# Patient Record
Sex: FEMALE | Race: WHITE | Employment: FULL TIME | URBAN - METROPOLITAN AREA
[De-identification: names, ages, dates, MRNs, and addresses within clinical notes are randomized per-mention and may not be internally consistent; named-entity substitution may affect disease eponyms.]

---

## 2019-02-13 PROBLEM — E66.812 CLASS 2 OBESITY WITH BODY MASS INDEX (BMI) OF 38.0 TO 38.9 IN ADULT: Status: ACTIVE | Noted: 2019-02-13

## 2019-02-13 PROBLEM — R74.8 ELEVATED ALKALINE PHOSPHATASE LEVEL: Status: ACTIVE | Noted: 2019-02-13

## 2019-02-13 PROBLEM — E66.9 CLASS 2 OBESITY WITH BODY MASS INDEX (BMI) OF 38.0 TO 38.9 IN ADULT: Status: ACTIVE | Noted: 2019-02-13

## 2019-02-13 PROBLEM — E05.90 HYPERTHYROIDISM: Status: ACTIVE | Noted: 2019-02-13

## 2019-02-14 ENCOUNTER — OFFICE VISIT (OUTPATIENT)
Dept: ENDOCRINOLOGY | Age: 64
End: 2019-02-14
Payer: COMMERCIAL

## 2019-02-14 VITALS
SYSTOLIC BLOOD PRESSURE: 124 MMHG | BODY MASS INDEX: 37.25 KG/M2 | HEART RATE: 75 BPM | DIASTOLIC BLOOD PRESSURE: 69 MMHG | HEIGHT: 62 IN | WEIGHT: 202.4 LBS

## 2019-02-14 DIAGNOSIS — I10 ESSENTIAL HYPERTENSION: ICD-10-CM

## 2019-02-14 DIAGNOSIS — E05.90 HYPERTHYROIDISM: ICD-10-CM

## 2019-02-14 DIAGNOSIS — E78.2 MIXED HYPERLIPIDEMIA: ICD-10-CM

## 2019-02-14 DIAGNOSIS — R74.8 ELEVATED ALKALINE PHOSPHATASE LEVEL: ICD-10-CM

## 2019-02-14 DIAGNOSIS — E66.01 CLASS 2 SEVERE OBESITY WITH SERIOUS COMORBIDITY AND BODY MASS INDEX (BMI) OF 37.0 TO 37.9 IN ADULT, UNSPECIFIED OBESITY TYPE (HCC): ICD-10-CM

## 2019-02-14 DIAGNOSIS — E04.9 GOITER: ICD-10-CM

## 2019-02-14 PROCEDURE — 99205 OFFICE O/P NEW HI 60 MIN: CPT | Performed by: INTERNAL MEDICINE

## 2019-02-14 RX ORDER — METFORMIN HYDROCHLORIDE 500 MG/1
2000 TABLET, EXTENDED RELEASE ORAL DAILY
Qty: 360 TABLET | Refills: 0 | Status: SHIPPED | OUTPATIENT
Start: 2019-02-14 | End: 2019-06-10 | Stop reason: SDUPTHER

## 2019-02-14 RX ORDER — GLIPIZIDE 10 MG/1
10 TABLET, FILM COATED, EXTENDED RELEASE ORAL 2 TIMES DAILY
Qty: 180 TABLET | Refills: 0 | Status: SHIPPED | OUTPATIENT
Start: 2019-02-14 | End: 2019-06-10

## 2019-02-14 RX ORDER — GLIPIZIDE 10 MG/1
10 TABLET, FILM COATED, EXTENDED RELEASE ORAL DAILY
COMMUNITY
Start: 2019-02-06 | End: 2019-02-14 | Stop reason: SDUPTHER

## 2019-02-14 RX ORDER — FUROSEMIDE 20 MG/1
20 TABLET ORAL PRN
COMMUNITY
Start: 2018-07-12

## 2019-02-14 RX ORDER — IBUPROFEN 200 MG
200 TABLET ORAL EVERY 6 HOURS PRN
COMMUNITY

## 2019-02-21 ENCOUNTER — HOSPITAL ENCOUNTER (OUTPATIENT)
Dept: ULTRASOUND IMAGING | Age: 64
Discharge: HOME | End: 2019-02-21
Payer: COMMERCIAL

## 2019-02-21 DIAGNOSIS — E05.90 HYPERTHYROIDISM: ICD-10-CM

## 2019-02-21 DIAGNOSIS — R74.8 ELEVATED ALKALINE PHOSPHATASE LEVEL: ICD-10-CM

## 2019-02-21 DIAGNOSIS — E78.2 MIXED HYPERLIPIDEMIA: ICD-10-CM

## 2019-02-21 DIAGNOSIS — E04.9 GOITER: ICD-10-CM

## 2019-02-21 LAB
A/G RATIO: 1.2 (ref 1.1–2.2)
ALBUMIN SERPL-MCNC: 3.8 G/DL (ref 3.4–5)
ALP BLD-CCNC: 159 U/L (ref 40–129)
ALT SERPL-CCNC: 10 U/L (ref 10–40)
ANION GAP SERPL CALCULATED.3IONS-SCNC: 14 MMOL/L (ref 3–16)
ANTI-THYROGLOB ABS: <10 IU/ML
AST SERPL-CCNC: 19 U/L (ref 15–37)
BILIRUB SERPL-MCNC: 0.3 MG/DL (ref 0–1)
BUN BLDV-MCNC: 23 MG/DL (ref 7–20)
CALCIUM SERPL-MCNC: 9.5 MG/DL (ref 8.3–10.6)
CHLORIDE BLD-SCNC: 100 MMOL/L (ref 99–110)
CHOLESTEROL, TOTAL: 138 MG/DL (ref 0–199)
CO2: 27 MMOL/L (ref 21–32)
CREAT SERPL-MCNC: 0.7 MG/DL (ref 0.6–1.2)
CREATININE URINE: 82.4 MG/DL (ref 28–259)
ESTIMATED AVERAGE GLUCOSE: 203 MG/DL
GFR AFRICAN AMERICAN: >60
GFR NON-AFRICAN AMERICAN: >60
GLOBULIN: 3.1 G/DL
GLUCOSE BLD-MCNC: 108 MG/DL (ref 70–99)
HBA1C MFR BLD: 8.7 %
HDLC SERPL-MCNC: 52 MG/DL (ref 40–60)
LDL CHOLESTEROL CALCULATED: 70 MG/DL
MICROALBUMIN UR-MCNC: <1.2 MG/DL
MICROALBUMIN/CREAT UR-RTO: NORMAL MG/G (ref 0–30)
PARATHYROID HORMONE INTACT: 25.8 PG/ML (ref 14–72)
PHOSPHORUS: 4.4 MG/DL (ref 2.5–4.9)
POTASSIUM SERPL-SCNC: 4.3 MMOL/L (ref 3.5–5.1)
SODIUM BLD-SCNC: 141 MMOL/L (ref 136–145)
T3 FREE: 4.6 PG/ML (ref 2.3–4.2)
T3 TOTAL: 2.22 NG/ML (ref 0.8–2)
T4 FREE: 1.5 NG/DL (ref 0.9–1.8)
T4 TOTAL: 10.4 UG/DL (ref 4.5–10.9)
THYROID PEROXIDASE (TPO) ABS: 116 IU/ML
TOTAL PROTEIN: 6.9 G/DL (ref 6.4–8.2)
TRIGL SERPL-MCNC: 78 MG/DL (ref 0–150)
VITAMIN D 25-HYDROXY: 77.5 NG/ML
VLDLC SERPL CALC-MCNC: 16 MG/DL

## 2019-02-21 PROCEDURE — 83520 IMMUNOASSAY QUANT NOS NONAB: CPT

## 2019-02-21 PROCEDURE — 36415 COLL VENOUS BLD VENIPUNCTURE: CPT

## 2019-02-21 PROCEDURE — 82306 VITAMIN D 25 HYDROXY: CPT

## 2019-02-21 PROCEDURE — 83970 ASSAY OF PARATHORMONE: CPT

## 2019-02-21 PROCEDURE — 84481 FREE ASSAY (FT-3): CPT

## 2019-02-21 PROCEDURE — 84439 ASSAY OF FREE THYROXINE: CPT

## 2019-02-21 PROCEDURE — 86376 MICROSOMAL ANTIBODY EACH: CPT

## 2019-02-21 PROCEDURE — 80053 COMPREHEN METABOLIC PANEL: CPT

## 2019-02-21 PROCEDURE — 84080 ASSAY ALKALINE PHOSPHATASES: CPT

## 2019-02-21 PROCEDURE — 84100 ASSAY OF PHOSPHORUS: CPT

## 2019-02-21 PROCEDURE — 82043 UR ALBUMIN QUANTITATIVE: CPT

## 2019-02-21 PROCEDURE — 84075 ASSAY ALKALINE PHOSPHATASE: CPT

## 2019-02-21 PROCEDURE — 76536 US EXAM OF HEAD AND NECK: CPT

## 2019-02-21 PROCEDURE — 84445 ASSAY OF TSI GLOBULIN: CPT

## 2019-02-21 PROCEDURE — 84681 ASSAY OF C-PEPTIDE: CPT

## 2019-02-21 PROCEDURE — 80061 LIPID PANEL: CPT

## 2019-02-21 PROCEDURE — 82570 ASSAY OF URINE CREATININE: CPT

## 2019-02-21 PROCEDURE — 83036 HEMOGLOBIN GLYCOSYLATED A1C: CPT

## 2019-02-21 PROCEDURE — 86800 THYROGLOBULIN ANTIBODY: CPT

## 2019-02-23 LAB
C-PEPTIDE: 3.4 NG/ML (ref 1.1–4.4)
TSH RECEPTOR AB: 3.6 IU/L

## 2019-02-24 LAB
ALK PHOS OTHER CALC: 0 U/L
ALK PHOSPHATASE: 183 U/L (ref 40–120)
ALKALINE PHOSPHATASE BONE FRACTION: 55 U/L (ref 0–55)
ALKALINE PHOSPHATASE LIVER FRACTION: 128 U/L (ref 0–94)
THYROID STIMULATING IMMUNOGLOBULIN: 263 %

## 2019-03-03 ENCOUNTER — TELEPHONE (OUTPATIENT)
Dept: ENDOCRINOLOGY | Age: 64
End: 2019-03-03

## 2019-03-03 DIAGNOSIS — E04.2 MULTINODULAR GOITER: ICD-10-CM

## 2019-03-03 DIAGNOSIS — E05.90 HYPERTHYROIDISM: Primary | ICD-10-CM

## 2019-03-13 ENCOUNTER — HOSPITAL ENCOUNTER (OUTPATIENT)
Dept: NUCLEAR MEDICINE | Age: 64
Discharge: HOME | End: 2019-03-13
Payer: COMMERCIAL

## 2019-03-13 ENCOUNTER — HOSPITAL ENCOUNTER (OUTPATIENT)
Age: 64
Discharge: HOME | End: 2019-03-13
Payer: COMMERCIAL

## 2019-03-13 DIAGNOSIS — E05.90 HYPERTHYROIDISM: ICD-10-CM

## 2019-03-13 DIAGNOSIS — E04.2 MULTINODULAR GOITER: ICD-10-CM

## 2019-03-13 LAB — TSH SERPL DL<=0.05 MIU/L-ACNC: <0.01 UIU/ML (ref 0.27–4.2)

## 2019-03-13 PROCEDURE — 84443 ASSAY THYROID STIM HORMONE: CPT

## 2019-03-13 PROCEDURE — 36415 COLL VENOUS BLD VENIPUNCTURE: CPT

## 2019-03-13 PROCEDURE — 78014 THYROID IMAGING W/BLOOD FLOW: CPT

## 2019-03-13 PROCEDURE — A9516 IODINE I-123 SOD IODIDE MIC: HCPCS | Performed by: INTERNAL MEDICINE

## 2019-03-13 PROCEDURE — 3430000000 HC RX DIAGNOSTIC RADIOPHARMACEUTICAL: Performed by: INTERNAL MEDICINE

## 2019-03-13 RX ADMIN — SODIUM IODIDE I 123 318 MICRO CURIE: 100 CAPSULE, GELATIN COATED ORAL at 08:20

## 2019-03-14 ENCOUNTER — HOSPITAL ENCOUNTER (OUTPATIENT)
Dept: NUCLEAR MEDICINE | Age: 64
Discharge: HOME | End: 2019-03-14
Payer: COMMERCIAL

## 2019-03-15 ENCOUNTER — TELEPHONE (OUTPATIENT)
Dept: ENDOCRINOLOGY | Age: 64
End: 2019-03-15

## 2019-03-15 DIAGNOSIS — E04.2 MULTINODULAR GOITER: Primary | ICD-10-CM

## 2019-03-26 ENCOUNTER — HOSPITAL ENCOUNTER (OUTPATIENT)
Dept: ULTRASOUND IMAGING | Age: 64
Discharge: HOME | End: 2019-03-26
Payer: COMMERCIAL

## 2019-03-26 VITALS
TEMPERATURE: 97.4 F | HEIGHT: 62 IN | HEART RATE: 74 BPM | SYSTOLIC BLOOD PRESSURE: 127 MMHG | OXYGEN SATURATION: 100 % | RESPIRATION RATE: 16 BRPM | WEIGHT: 202 LBS | BODY MASS INDEX: 37.17 KG/M2 | DIASTOLIC BLOOD PRESSURE: 62 MMHG

## 2019-03-26 DIAGNOSIS — E04.2 MULTINODULAR GOITER: ICD-10-CM

## 2019-03-26 PROCEDURE — 88173 CYTOPATH EVAL FNA REPORT: CPT

## 2019-03-26 PROCEDURE — 2500000003 HC RX 250 WO HCPCS: Performed by: INTERNAL MEDICINE

## 2019-03-26 PROCEDURE — 10005 FNA BX W/US GDN 1ST LES: CPT

## 2019-03-26 PROCEDURE — 88305 TISSUE EXAM BY PATHOLOGIST: CPT

## 2019-03-26 PROCEDURE — 6370000000 HC RX 637 (ALT 250 FOR IP): Performed by: INTERNAL MEDICINE

## 2019-03-26 RX ORDER — LIDOCAINE HYDROCHLORIDE 10 MG/ML
5 INJECTION, SOLUTION EPIDURAL; INFILTRATION; INTRACAUDAL; PERINEURAL ONCE
Status: COMPLETED | OUTPATIENT
Start: 2019-03-26 | End: 2019-03-26

## 2019-03-26 RX ADMIN — LIDOCAINE HYDROCHLORIDE 5 ML: 10 INJECTION, SOLUTION EPIDURAL; INFILTRATION; INTRACAUDAL; PERINEURAL at 09:01

## 2019-03-26 RX ADMIN — NEOMYCIN AND POLYMYXIN B SULFATES, BACITRACIN ZINC, AND HYDROCORTISONE: 3.5; 5000; 400; 1 OINTMENT TOPICAL at 09:10

## 2019-03-29 ENCOUNTER — OFFICE VISIT (OUTPATIENT)
Dept: ENDOCRINOLOGY | Age: 64
End: 2019-03-29
Payer: COMMERCIAL

## 2019-03-29 VITALS
WEIGHT: 197.2 LBS | SYSTOLIC BLOOD PRESSURE: 127 MMHG | HEART RATE: 82 BPM | BODY MASS INDEX: 36.29 KG/M2 | HEIGHT: 62 IN | DIASTOLIC BLOOD PRESSURE: 56 MMHG

## 2019-03-29 DIAGNOSIS — E05.90 HYPERTHYROIDISM: ICD-10-CM

## 2019-03-29 DIAGNOSIS — I10 ESSENTIAL HYPERTENSION: ICD-10-CM

## 2019-03-29 DIAGNOSIS — E04.2 MULTINODULAR GOITER: ICD-10-CM

## 2019-03-29 DIAGNOSIS — E66.01 CLASS 2 SEVERE OBESITY WITH SERIOUS COMORBIDITY AND BODY MASS INDEX (BMI) OF 36.0 TO 36.9 IN ADULT, UNSPECIFIED OBESITY TYPE (HCC): ICD-10-CM

## 2019-03-29 DIAGNOSIS — E78.2 MIXED HYPERLIPIDEMIA: ICD-10-CM

## 2019-03-29 DIAGNOSIS — R74.8 ELEVATED ALKALINE PHOSPHATASE LEVEL: ICD-10-CM

## 2019-03-29 PROCEDURE — 99215 OFFICE O/P EST HI 40 MIN: CPT | Performed by: INTERNAL MEDICINE

## 2019-05-20 ENCOUNTER — TELEPHONE (OUTPATIENT)
Dept: ENDOCRINOLOGY | Age: 64
End: 2019-05-20

## 2019-05-20 NOTE — TELEPHONE ENCOUNTER
Pt called bc she is supposed to have an FNA completed in [facility] on 6/03/19 @ 1:00PM. Pt wasn't sure if she had to fast or what medications she's supposed to stop taking. Informed patient that they will get a call back from nurse. Pt would like a detailed msg if she doesn't answer.

## 2019-05-20 NOTE — TELEPHONE ENCOUNTER
LMOM that patient does not have to fast or stop any of her medications to have the biopsy. Patient was advised to call with any further questions.

## 2019-06-03 ENCOUNTER — OFFICE VISIT (OUTPATIENT)
Dept: ENDOCRINOLOGY | Age: 64
End: 2019-06-03
Payer: COMMERCIAL

## 2019-06-03 VITALS
HEIGHT: 62 IN | WEIGHT: 203.2 LBS | OXYGEN SATURATION: 98 % | HEART RATE: 72 BPM | RESPIRATION RATE: 16 BRPM | SYSTOLIC BLOOD PRESSURE: 114 MMHG | DIASTOLIC BLOOD PRESSURE: 70 MMHG | BODY MASS INDEX: 37.39 KG/M2

## 2019-06-03 DIAGNOSIS — E04.1 THYROID NODULE: Primary | ICD-10-CM

## 2019-06-03 PROCEDURE — 10005 FNA BX W/US GDN 1ST LES: CPT | Performed by: INTERNAL MEDICINE

## 2019-06-03 NOTE — PROGRESS NOTES
Ultrasound Guided Fine Needle Aspiration     Indication: Thyroid Nodule  :   Daniel Arm  Assistant   Aldo Gore  Procedure:  Procedure was explained to patient and consent was obtained. Skin was prepped in semi-sterile manner and local anasthesia (ethyl Chloride)was administered. Right 1.7 cm was biopsied under ultrasound guidance. 27-Gauge needle was used for aspiration under capillary technique. 5 passes were made.  3 were used for cytolyte tube and 2 for genetic testing

## 2019-06-06 ENCOUNTER — HOSPITAL ENCOUNTER (OUTPATIENT)
Age: 64
Discharge: HOME | End: 2019-06-06
Payer: COMMERCIAL

## 2019-06-06 DIAGNOSIS — E05.90 HYPERTHYROIDISM: ICD-10-CM

## 2019-06-06 LAB
A/G RATIO: 1 (ref 1.1–2.2)
ALBUMIN SERPL-MCNC: 3.5 G/DL (ref 3.4–5)
ALP BLD-CCNC: 155 U/L (ref 40–129)
ALT SERPL-CCNC: 8 U/L (ref 10–40)
ANION GAP SERPL CALCULATED.3IONS-SCNC: 19 MMOL/L (ref 3–16)
AST SERPL-CCNC: 18 U/L (ref 15–37)
BILIRUB SERPL-MCNC: 0.3 MG/DL (ref 0–1)
BUN BLDV-MCNC: 25 MG/DL (ref 7–20)
CALCIUM SERPL-MCNC: 9.4 MG/DL (ref 8.3–10.6)
CHLORIDE BLD-SCNC: 104 MMOL/L (ref 99–110)
CHOLESTEROL, TOTAL: 153 MG/DL (ref 0–199)
CO2: 21 MMOL/L (ref 21–32)
CREAT SERPL-MCNC: 0.7 MG/DL (ref 0.6–1.2)
ESTIMATED AVERAGE GLUCOSE: 177.2 MG/DL
GFR AFRICAN AMERICAN: >60
GFR NON-AFRICAN AMERICAN: >60
GLOBULIN: 3.6 G/DL
GLUCOSE BLD-MCNC: 77 MG/DL (ref 70–99)
HBA1C MFR BLD: 7.8 %
HDLC SERPL-MCNC: 53 MG/DL (ref 40–60)
LDL CHOLESTEROL CALCULATED: 83 MG/DL
POTASSIUM SERPL-SCNC: 4.1 MMOL/L (ref 3.5–5.1)
SODIUM BLD-SCNC: 144 MMOL/L (ref 136–145)
T3 FREE: 4.3 PG/ML (ref 2.3–4.2)
T3 TOTAL: 1.93 NG/ML (ref 0.8–2)
T4 FREE: 1.4 NG/DL (ref 0.9–1.8)
T4 TOTAL: 10.7 UG/DL (ref 4.5–10.9)
TOTAL PROTEIN: 7.1 G/DL (ref 6.4–8.2)
TRIGL SERPL-MCNC: 87 MG/DL (ref 0–150)
TSH SERPL DL<=0.05 MIU/L-ACNC: <0.01 UIU/ML (ref 0.27–4.2)
VITAMIN D 25-HYDROXY: 62.7 NG/ML
VLDLC SERPL CALC-MCNC: 17 MG/DL

## 2019-06-06 PROCEDURE — 84075 ASSAY ALKALINE PHOSPHATASE: CPT

## 2019-06-06 PROCEDURE — 80053 COMPREHEN METABOLIC PANEL: CPT

## 2019-06-06 PROCEDURE — 84443 ASSAY THYROID STIM HORMONE: CPT

## 2019-06-06 PROCEDURE — 83036 HEMOGLOBIN GLYCOSYLATED A1C: CPT

## 2019-06-06 PROCEDURE — 80061 LIPID PANEL: CPT

## 2019-06-06 PROCEDURE — 36415 COLL VENOUS BLD VENIPUNCTURE: CPT

## 2019-06-06 PROCEDURE — 84439 ASSAY OF FREE THYROXINE: CPT

## 2019-06-06 PROCEDURE — 82306 VITAMIN D 25 HYDROXY: CPT

## 2019-06-06 PROCEDURE — 84481 FREE ASSAY (FT-3): CPT

## 2019-06-06 PROCEDURE — 84080 ASSAY ALKALINE PHOSPHATASES: CPT

## 2019-06-08 LAB
ALK PHOS OTHER CALC: 0 U/L
ALK PHOSPHATASE: 164 U/L (ref 40–120)
ALKALINE PHOSPHATASE BONE FRACTION: 43 U/L (ref 0–55)
ALKALINE PHOSPHATASE LIVER FRACTION: 121 U/L (ref 0–94)

## 2019-06-10 ENCOUNTER — OFFICE VISIT (OUTPATIENT)
Dept: ENDOCRINOLOGY | Age: 64
End: 2019-06-10
Payer: COMMERCIAL

## 2019-06-10 VITALS
BODY MASS INDEX: 37.8 KG/M2 | OXYGEN SATURATION: 98 % | HEIGHT: 62 IN | WEIGHT: 205.4 LBS | SYSTOLIC BLOOD PRESSURE: 136 MMHG | DIASTOLIC BLOOD PRESSURE: 56 MMHG | HEART RATE: 67 BPM

## 2019-06-10 DIAGNOSIS — I10 ESSENTIAL HYPERTENSION: ICD-10-CM

## 2019-06-10 DIAGNOSIS — E66.01 CLASS 2 SEVERE OBESITY WITH SERIOUS COMORBIDITY AND BODY MASS INDEX (BMI) OF 37.0 TO 37.9 IN ADULT, UNSPECIFIED OBESITY TYPE (HCC): ICD-10-CM

## 2019-06-10 DIAGNOSIS — E78.2 MIXED HYPERLIPIDEMIA: ICD-10-CM

## 2019-06-10 DIAGNOSIS — E05.90 HYPERTHYROIDISM: ICD-10-CM

## 2019-06-10 DIAGNOSIS — E04.2 MULTINODULAR GOITER: ICD-10-CM

## 2019-06-10 DIAGNOSIS — R74.8 ELEVATED ALKALINE PHOSPHATASE LEVEL: ICD-10-CM

## 2019-06-10 PROCEDURE — 99215 OFFICE O/P EST HI 40 MIN: CPT | Performed by: INTERNAL MEDICINE

## 2019-06-10 RX ORDER — GLIPIZIDE 10 MG/1
10 TABLET, FILM COATED, EXTENDED RELEASE ORAL 2 TIMES DAILY
Qty: 180 TABLET | Refills: 1 | Status: SHIPPED | OUTPATIENT
Start: 2019-06-10 | End: 2019-12-31

## 2019-06-10 RX ORDER — METFORMIN HYDROCHLORIDE 500 MG/1
2000 TABLET, EXTENDED RELEASE ORAL DAILY
Qty: 360 TABLET | Refills: 1 | Status: SHIPPED | OUTPATIENT
Start: 2019-06-10 | End: 2019-12-31

## 2019-06-10 NOTE — PROGRESS NOTES
abused: Not on file     Physically abused: Not on file     Forced sexual activity: Not on file   Other Topics Concern    Not on file   Social History Narrative    Not on file     Family History   Problem Relation Age of Onset    Heart Disease Mother     Diabetes Mother     Diabetes Father     COPD Father     Diabetes Maternal Aunt     Heart Disease Brother     Cancer Sister      Current Outpatient Medications   Medication Sig Dispense Refill    metFORMIN (GLUCOPHAGE XR) 500 MG extended release tablet Take 4 tablets by mouth daily 360 tablet 1    glipiZIDE (GLUCOTROL XL) 10 MG extended release tablet Take 1 tablet by mouth 2 times daily 180 tablet 1    Dulaglutide (TRULICITY) 3.23 CC/1.2DS SOPN 1 pen weekly 4 pen 2    Cholecalciferol (VITAMIN D3) 5000 units TABS Take one tab qod 30 tablet 0    insulin glargine (LANTUS SOLOSTAR) 100 UNIT/ML injection pen Inject 60 Units into the skin      furosemide (LASIX) 20 MG tablet Take 20 mg by mouth      ibuprofen (ADVIL;MOTRIN) 200 MG tablet Take 200 mg by mouth every 6 hours as needed for Pain      simvastatin (ZOCOR) 20 MG tablet Take 20 mg by mouth nightly.  lisinopril-hydrochlorothiazide (PRINZIDE;ZESTORETIC) 20-25 MG per tablet Take 1 tablet by mouth daily.  aspirin 81 MG tablet Take 81 mg by mouth daily. No current facility-administered medications for this visit.       No Known Allergies  Family Status   Relation Name Status    Mother      Father     [de-identified]  (Not Specified)    Sister  Alive    Brother  Alive    Sister         Lab Review:    No results found for: WBC, HGB, HCT, MCV, PLT  Lab Results   Component Value Date     2019    K 4.1 2019     2019    CO2 21 2019    BUN 25 2019    CREATININE 0.7 2019    GLUCOSE 77 2019    CALCIUM 9.4 2019    PROT 7.1 2019    LABALBU 3.5 2019    BILITOT 0.3 2019    ALKPHOS 164 2019 ALKPHOS 155 06/06/2019    AST 18 06/06/2019    ALT 8 06/06/2019    LABGLOM >60 06/06/2019    GFRAA >60 06/06/2019    AGRATIO 1.0 06/06/2019    GLOB 3.6 06/06/2019     Lab Results   Component Value Date    TSH <0.01 06/06/2019    FT3 4.3 06/06/2019     Lab Results   Component Value Date    LABA1C 7.8 06/06/2019     Lab Results   Component Value Date    .2 06/06/2019     Lab Results   Component Value Date    CHOL 153 06/06/2019     Lab Results   Component Value Date    TRIG 87 06/06/2019     Lab Results   Component Value Date    HDL 53 06/06/2019     Lab Results   Component Value Date    LDLCALC 83 06/06/2019     Lab Results   Component Value Date    LABVLDL 17 06/06/2019     No results found for: [facility]  Lab Results   Component Value Date    LABMICR <1.20 02/21/2019     Lab Results   Component Value Date    VITD25 62.7 06/06/2019        Review of Systems:  Constitutional: has fatigue, no fever, no recent weight gain, has recent weight loss, has changes in appetite  Eyes: no eye pain, no change in vision, no eye redness, no eye irritation, no double vision  Ears, nose, throat: no nasal congestion, no sore throat, no earache, no decrease in hearing, no hoarseness, no dry mouth, no sinus problems, no difficulty swallowing, no neck lumps, no dental problems, no mouth sores, no ringing in ears  Pulmonary: no shortness of breath, no wheezing, no dyspnea on exertion, no cough  Cardiovascular: no chest pain, has lower extremity edema, no orthopnea, no intermittent leg claudication, no palpitations  Gastrointestinal: no abdominal pain, no nausea, no vomiting, no diarrhea, no constipation, no dysphagia, no heartburn, no bloating  Genitourinary: no dysuria, no urinary incontinence, no urinary hesitancy, no urinary frequency, no feelings of urinary urgency, no nocturia  Musculoskeletal: no joint swelling, no joint stiffness, has joint pain, no muscle cramps, no muscle pain, no bone pain  Integument/Breast: no hair pulses and 2+ bilaterally, No edema  Abdomen: abdomen is soft, non-tender with no masses  Musculoskeletal: normal gait and station and exam of the digits and nails are normal  Neurological: normal coordination and normal general cortical function, has mild hand tremor    Visual inspection:  Deformity/amputation: absent  Skin lesions/pre-ulcerative calluses: absent  Edema: right- negative, left- negative    Sensory exam:  Monofilament sensation: normal  (minimum of 5 random plantar locations tested, avoiding callused areas - > 1 area with absence of sensation is + for neuropathy)    Plus at least one of the following:  Pulses: normal  Proprioception: Intact  Vibration (128 Hz): Impaired    ASSESSMENT/PLAN:  1. DM type 2, uncontrolled, with neuropathy (HCC)  Hemoglobin A1c 8.7-7.8. Diabetes is uncontrolled. Worse. Metformin  Glipizide   Lantus   Start Trulicity  - Comprehensive Metabolic Panel; Future  - Hemoglobin A1C; Future  - C-Peptide; Future  - Microalbumin / Creatinine Urine Ratio; Future    2. Mixed hyperlipidemia  - Lipid Panel; Future    3. Essential hypertension  Continue current treatment    4. Class 2 severe obesity with serious comorbidity and body mass index (BMI) of 37.0 to 37.9 in adult, unspecified obesity type (HCC)  Diet, exercise. 5. Hyperthyroidism  Uncontrolled. Recommended I-131 Tx. Nodule benign on FNA. TSH receptor antibody and TSI positive. Also positive TPO antibody.  - T3; Future  - T3, Free; Future  - T4, Free; Future  - T4; Future    6. Elevated alkaline phosphatase level  Normal PTH, normal calcium. Alkaline phosphatase, elevated - liver fraction. Referred to family doctor. - Alkaline Phosphatase, Isoenzymes; Future  - PTH, Intact; Future  - Vitamin D 25 Hydroxy; Future  - Phosphorus; Future  Vitamin D to 5000 IU qod.    7.  Multinodular Goiter  Repeated FNA -benign.   Right 1.7 cm nodule nondiagnostic    Thyroid, Right Nodule Fine Needle Aspiration:     - Non-diagnostic    - US Head Neck Soft Tissue Thyroid; Future      Reviewed and/or ordered clinical lab results Yes  Reviewed and/or ordered radiology tests Yes  Reviewed and/or ordered other diagnostic tests No  Discussed test results with performing physician No  Independently reviewed image, tracing, or specimen Yes, thyroid uptake and scan  Made a decision to obtain old records No  Reviewed and summarized old records Yes  TSH less than 0.005, PTH 30, FT4 1.41, ionized calcium 1.13, hemoglobin 11.8, hemoglobin A1c 8.3, LDL cholesterol 85, glucose 104, alkaline phosphatase 212. Obtained history from other than patient No    Grupo Newby was counseled regarding symptoms of diabetes, elevated alk phos, hyperthyroidism diagnosis, course and complications of disease if inadequately treated, side effects of medications, diagnosis, treatment options, and prognosis, risks, benefits, complications, and alternatives of treatment, labs, imaging and other studies and treatment targets and goals, insulin, medications, hyperthyroid treatment, I-131 Tx, permanent hypothyroidism. She understands instructions and counseling. Total visit time 40 min, >50% was counseling time. Return in about 3 months (around 9/10/2019) for thyroid problems, diabetes, 40 min.

## 2019-07-12 ENCOUNTER — HOSPITAL ENCOUNTER (OUTPATIENT)
Dept: NUCLEAR MEDICINE | Age: 64
Discharge: HOME | End: 2019-07-12
Payer: COMMERCIAL

## 2019-07-12 DIAGNOSIS — E05.90 HYPERTHYROIDISM: ICD-10-CM

## 2019-07-12 PROCEDURE — 79005 NUCLEAR RX ORAL ADMIN: CPT

## 2019-07-12 PROCEDURE — 3430000000 HC RX DIAGNOSTIC RADIOPHARMACEUTICAL: Performed by: INTERNAL MEDICINE

## 2019-07-12 PROCEDURE — A9517 I131 IODIDE CAP, RX: HCPCS | Performed by: INTERNAL MEDICINE

## 2019-07-12 RX ADMIN — SODIUM IODIDE I 131 10.3 MILLICURIE: 1 CAPSULE, GELATIN COATED ORAL at 09:35

## 2019-08-31 ENCOUNTER — HOSPITAL ENCOUNTER (EMERGENCY)
Age: 64
Discharge: HOME | End: 2019-08-31
Attending: EMERGENCY MEDICINE
Payer: COMMERCIAL

## 2019-08-31 ENCOUNTER — APPOINTMENT (OUTPATIENT)
Dept: CT IMAGING | Age: 64
End: 2019-08-31
Payer: COMMERCIAL

## 2019-08-31 VITALS
HEART RATE: 67 BPM | RESPIRATION RATE: 15 BRPM | TEMPERATURE: 98.1 F | WEIGHT: 216 LBS | OXYGEN SATURATION: 96 % | SYSTOLIC BLOOD PRESSURE: 123 MMHG | HEIGHT: 62 IN | BODY MASS INDEX: 39.75 KG/M2 | DIASTOLIC BLOOD PRESSURE: 57 MMHG

## 2019-08-31 DIAGNOSIS — N20.0 NEPHROLITHIASIS: ICD-10-CM

## 2019-08-31 DIAGNOSIS — N13.9 OBSTRUCTED, UROPATHY: ICD-10-CM

## 2019-08-31 DIAGNOSIS — N83.8 MASS OF LEFT OVARY: ICD-10-CM

## 2019-08-31 DIAGNOSIS — N30.01 ACUTE CYSTITIS WITH HEMATURIA: Primary | ICD-10-CM

## 2019-08-31 LAB
A/G RATIO: 1.1 (ref 1.1–2.2)
ALBUMIN SERPL-MCNC: 4.3 G/DL (ref 3.4–5)
ALP BLD-CCNC: 144 U/L (ref 40–129)
ALT SERPL-CCNC: 7 U/L (ref 10–40)
ANION GAP SERPL CALCULATED.3IONS-SCNC: 13 MMOL/L (ref 3–16)
AST SERPL-CCNC: 23 U/L (ref 15–37)
BACTERIA: ABNORMAL /HPF
BASOPHILS ABSOLUTE: 0 K/UL (ref 0–0.2)
BASOPHILS RELATIVE PERCENT: 0.3 %
BILIRUB SERPL-MCNC: 0.5 MG/DL (ref 0–1)
BILIRUBIN URINE: NEGATIVE
BLOOD, URINE: ABNORMAL
BUN BLDV-MCNC: 23 MG/DL (ref 7–20)
CALCIUM SERPL-MCNC: 9.4 MG/DL (ref 8.3–10.6)
CHLORIDE BLD-SCNC: 101 MMOL/L (ref 99–110)
CLARITY: ABNORMAL
CO2: 28 MMOL/L (ref 21–32)
COLOR: YELLOW
CREAT SERPL-MCNC: 1 MG/DL (ref 0.6–1.2)
EOSINOPHILS ABSOLUTE: 0 K/UL (ref 0–0.6)
EOSINOPHILS RELATIVE PERCENT: 0.2 %
EPITHELIAL CELLS, UA: 1 /HPF (ref 0–5)
GFR AFRICAN AMERICAN: >60
GFR NON-AFRICAN AMERICAN: 56
GLOBULIN: 4 G/DL
GLUCOSE BLD-MCNC: 135 MG/DL (ref 70–99)
GLUCOSE URINE: NEGATIVE MG/DL
HCT VFR BLD CALC: 40.6 % (ref 36–48)
HEMOGLOBIN: 13.2 G/DL (ref 12–16)
HYALINE CASTS: 0 /LPF (ref 0–8)
KETONES, URINE: NEGATIVE MG/DL
LEUKOCYTE ESTERASE, URINE: ABNORMAL
LIPASE: 19 U/L (ref 13–60)
LYMPHOCYTES ABSOLUTE: 0.8 K/UL (ref 1–5.1)
LYMPHOCYTES RELATIVE PERCENT: 6.1 %
MAGNESIUM: 1.8 MG/DL (ref 1.8–2.4)
MCH RBC QN AUTO: 25.2 PG (ref 26–34)
MCHC RBC AUTO-ENTMCNC: 32.4 G/DL (ref 31–36)
MCV RBC AUTO: 77.6 FL (ref 80–100)
MICROSCOPIC EXAMINATION: YES
MONOCYTES ABSOLUTE: 0.8 K/UL (ref 0–1.3)
MONOCYTES RELATIVE PERCENT: 6 %
NEUTROPHILS ABSOLUTE: 11.8 K/UL (ref 1.7–7.7)
NEUTROPHILS RELATIVE PERCENT: 87.4 %
NITRITE, URINE: NEGATIVE
PDW BLD-RTO: 17 % (ref 12.4–15.4)
PH UA: 6 (ref 5–8)
PLATELET # BLD: 261 K/UL (ref 135–450)
PMV BLD AUTO: 8 FL (ref 5–10.5)
POTASSIUM REFLEX MAGNESIUM: 3.3 MMOL/L (ref 3.5–5.1)
PROTEIN UA: ABNORMAL MG/DL
RBC # BLD: 5.23 M/UL (ref 4–5.2)
RBC UA: 343 /HPF (ref 0–4)
SODIUM BLD-SCNC: 142 MMOL/L (ref 136–145)
SPECIFIC GRAVITY UA: 1.02 (ref 1–1.03)
TOTAL PROTEIN: 8.3 G/DL (ref 6.4–8.2)
URINE REFLEX TO CULTURE: YES
URINE TYPE: ABNORMAL
UROBILINOGEN, URINE: 0.2 E.U./DL
WBC # BLD: 13.5 K/UL (ref 4–11)
WBC UA: 24 /HPF (ref 0–5)

## 2019-08-31 PROCEDURE — 2580000003 HC RX 258: Performed by: NURSE PRACTITIONER

## 2019-08-31 PROCEDURE — 83735 ASSAY OF MAGNESIUM: CPT

## 2019-08-31 PROCEDURE — 87086 URINE CULTURE/COLONY COUNT: CPT

## 2019-08-31 PROCEDURE — 85025 COMPLETE CBC W/AUTO DIFF WBC: CPT

## 2019-08-31 PROCEDURE — 6360000004 HC RX CONTRAST MEDICATION: Performed by: NURSE PRACTITIONER

## 2019-08-31 PROCEDURE — 6360000002 HC RX W HCPCS: Performed by: NURSE PRACTITIONER

## 2019-08-31 PROCEDURE — 96375 TX/PRO/DX INJ NEW DRUG ADDON: CPT

## 2019-08-31 PROCEDURE — 96374 THER/PROPH/DIAG INJ IV PUSH: CPT

## 2019-08-31 PROCEDURE — 96361 HYDRATE IV INFUSION ADD-ON: CPT

## 2019-08-31 PROCEDURE — 81001 URINALYSIS AUTO W/SCOPE: CPT

## 2019-08-31 PROCEDURE — 96376 TX/PRO/DX INJ SAME DRUG ADON: CPT

## 2019-08-31 PROCEDURE — 74177 CT ABD & PELVIS W/CONTRAST: CPT

## 2019-08-31 PROCEDURE — 83690 ASSAY OF LIPASE: CPT

## 2019-08-31 PROCEDURE — 80053 COMPREHEN METABOLIC PANEL: CPT

## 2019-08-31 PROCEDURE — 99284 EMERGENCY DEPT VISIT MOD MDM: CPT

## 2019-08-31 RX ORDER — TAMSULOSIN HYDROCHLORIDE 0.4 MG/1
0.4 CAPSULE ORAL DAILY
Qty: 5 CAPSULE | Refills: 0 | Status: SHIPPED | OUTPATIENT
Start: 2019-08-31 | End: 2019-12-03

## 2019-08-31 RX ORDER — NAPROXEN 500 MG/1
500 TABLET ORAL 2 TIMES DAILY WITH MEALS
Qty: 30 TABLET | Refills: 0 | Status: SHIPPED | OUTPATIENT
Start: 2019-08-31 | End: 2019-12-03

## 2019-08-31 RX ORDER — HYDROCODONE BITARTRATE AND ACETAMINOPHEN 5; 325 MG/1; MG/1
1 TABLET ORAL EVERY 6 HOURS PRN
Qty: 10 TABLET | Refills: 0 | Status: SHIPPED | OUTPATIENT
Start: 2019-08-31 | End: 2019-09-03

## 2019-08-31 RX ORDER — MORPHINE SULFATE 4 MG/ML
4 INJECTION, SOLUTION INTRAMUSCULAR; INTRAVENOUS EVERY 30 MIN PRN
Status: DISCONTINUED | OUTPATIENT
Start: 2019-08-31 | End: 2019-08-31 | Stop reason: HOSPADM

## 2019-08-31 RX ORDER — KETOROLAC TROMETHAMINE 30 MG/ML
30 INJECTION, SOLUTION INTRAMUSCULAR; INTRAVENOUS ONCE
Status: COMPLETED | OUTPATIENT
Start: 2019-08-31 | End: 2019-08-31

## 2019-08-31 RX ORDER — CEPHALEXIN 500 MG/1
500 CAPSULE ORAL 4 TIMES DAILY
Qty: 28 CAPSULE | Refills: 0 | Status: SHIPPED | OUTPATIENT
Start: 2019-08-31 | End: 2019-09-07

## 2019-08-31 RX ORDER — ONDANSETRON 4 MG/1
4-8 TABLET, ORALLY DISINTEGRATING ORAL EVERY 12 HOURS PRN
Qty: 12 TABLET | Refills: 0 | Status: SHIPPED | OUTPATIENT
Start: 2019-08-31 | End: 2019-12-03

## 2019-08-31 RX ORDER — ONDANSETRON 2 MG/ML
4 INJECTION INTRAMUSCULAR; INTRAVENOUS EVERY 30 MIN PRN
Status: COMPLETED | OUTPATIENT
Start: 2019-08-31 | End: 2019-08-31

## 2019-08-31 RX ORDER — 0.9 % SODIUM CHLORIDE 0.9 %
1000 INTRAVENOUS SOLUTION INTRAVENOUS ONCE
Status: COMPLETED | OUTPATIENT
Start: 2019-08-31 | End: 2019-08-31

## 2019-08-31 RX ADMIN — KETOROLAC TROMETHAMINE 30 MG: 30 INJECTION, SOLUTION INTRAMUSCULAR at 15:20

## 2019-08-31 RX ADMIN — MORPHINE SULFATE 4 MG: 4 INJECTION INTRAVENOUS at 09:43

## 2019-08-31 RX ADMIN — SODIUM CHLORIDE 1000 ML: 9 INJECTION, SOLUTION INTRAVENOUS at 09:43

## 2019-08-31 RX ADMIN — ONDANSETRON 4 MG: 2 INJECTION INTRAMUSCULAR; INTRAVENOUS at 09:43

## 2019-08-31 RX ADMIN — ONDANSETRON 4 MG: 2 INJECTION INTRAMUSCULAR; INTRAVENOUS at 13:06

## 2019-08-31 RX ADMIN — MORPHINE SULFATE 4 MG: 4 INJECTION INTRAVENOUS at 13:06

## 2019-08-31 RX ADMIN — IOPAMIDOL 75 ML: 755 INJECTION, SOLUTION INTRAVENOUS at 12:47

## 2019-08-31 ASSESSMENT — ENCOUNTER SYMPTOMS
VOMITING: 1
SHORTNESS OF BREATH: 0
NAUSEA: 1
BLOOD IN STOOL: 0
CONSTIPATION: 0
SORE THROAT: 0
ABDOMINAL PAIN: 0
DIARRHEA: 0
RHINORRHEA: 0

## 2019-08-31 ASSESSMENT — PAIN DESCRIPTION - LOCATION: LOCATION: FLANK

## 2019-08-31 ASSESSMENT — PAIN SCALES - GENERAL
PAINLEVEL_OUTOF10: 5

## 2019-08-31 ASSESSMENT — PAIN DESCRIPTION - PAIN TYPE: TYPE: ACUTE PAIN

## 2019-08-31 NOTE — ED PROVIDER NOTES
Except as noted above in the ROS, all other systems were reviewed and negative. PAST MEDICAL HISTORY     Past Medical History:   Diagnosis Date    Hyperlipidemia     Hypertension     Type II or unspecified type diabetes mellitus without mention of complication, not stated as uncontrolled          SURGICAL HISTORY       Past Surgical History:   Procedure Laterality Date    HYSTERECTOMY           CURRENT MEDICATIONS       Previous Medications    ASPIRIN 81 MG TABLET    Take 81 mg by mouth daily. CHOLECALCIFEROL (VITAMIN D3) 5000 UNITS TABS    Take one tab qod    DULAGLUTIDE (TRULICITY) 1.54 PX/8.2OC SOPN    1 pen weekly    FUROSEMIDE (LASIX) 20 MG TABLET    Take 20 mg by mouth    GLIPIZIDE (GLUCOTROL XL) 10 MG EXTENDED RELEASE TABLET    Take 1 tablet by mouth 2 times daily    IBUPROFEN (ADVIL;MOTRIN) 200 MG TABLET    Take 200 mg by mouth every 6 hours as needed for Pain    INSULIN GLARGINE (LANTUS SOLOSTAR) 100 UNIT/ML INJECTION PEN    Inject 60 Units into the skin    LISINOPRIL-HYDROCHLOROTHIAZIDE (PRINZIDE;ZESTORETIC) 20-25 MG PER TABLET    Take 1 tablet by mouth daily. METFORMIN (GLUCOPHAGE XR) 500 MG EXTENDED RELEASE TABLET    Take 4 tablets by mouth daily    SIMVASTATIN (ZOCOR) 20 MG TABLET    Take 10 mg by mouth nightly          ALLERGIES     Patient has no known allergies.     FAMILY HISTORY       Family History   Problem Relation Age of Onset    Heart Disease Mother     Diabetes Mother     Diabetes Father     COPD Father     Diabetes Maternal Aunt     Heart Disease Brother     Cancer Sister           SOCIAL HISTORY       Social History     Socioeconomic History    Marital status: Single     Spouse name: None    Number of children: None    Years of education: None    Highest education level: None   Occupational History    None   Social Needs    Financial resource strain: None    Food insecurity:     Worry: None     Inability: None    Transportation needs:     Medical: None 08/31/19 1445 08/31/19 1500 08/31/19 1515   BP: (!) 125/56  (!) 123/57    Pulse:   67    Resp:   15    Temp:   98.1 °F (36.7 °C)    TempSrc:   Oral    SpO2: 98% 99% 91% 96%   Weight:       Height:           Lissa Foster was given the following medications:  Medications   morphine injection 4 mg (4 mg Intravenous Given 8/31/19 1306)   0.9 % sodium chloride bolus (0 mLs Intravenous Stopped 8/31/19 1049)   ondansetron (ZOFRAN) injection 4 mg (4 mg Intravenous Given 8/31/19 1306)   iopamidol (ISOVUE-370) 76 % injection 75 mL (75 mLs Intravenous Given 8/31/19 1247)   ketorolac (TORADOL) injection 30 mg (30 mg Intravenous Given 8/31/19 1520)       Lissa Foster was evaluated in the emergency department with concern for right flank pain. She is actively vomiting on my initial assessment. Treated with IV fluids, morphine, and Zofran. She reports improvement in symptoms and is improved on repeat exam.  Urinalysis is suggestive of infection. She has a mild leukocytosis to support this. CT imaging demonstrates a right ureteral stone. It is obstructive. I did speak with Dr. Zeenat Jeffrey from urology about this patient. He feels outpatient management is reasonable on Keflex as the stone is 2 to 3 mm in size. Patient is not febrile. There is no evidence of renal dysfunction on labs. I did express concern to Dr. Zeenat Jeffrey for possible infected stone. He is not concerned. My suspicion is low for torsion, STDs, pyelonephritis, perinephric abscess, cauda equina, FRANCIA, or epidural abscess. The patient was instructed to push fluids at home and follow up with urology as needed. Of note, there is an incidental finding of left ovarian mass. The patient was informed of these results and instructed to follow-up with gynecology. Lissa Foster is stable in the ER and safe to follow as an outpatient. The patient is discharged on the following medications.   They were counseled on how to take the newly prescribed medications:  New Prescriptions    CEPHALEXIN (KEFLEX) 500 MG CAPSULE    Take 1 capsule by mouth 4 times daily for 7 days    HYDROCODONE-ACETAMINOPHEN (NORCO) 5-325 MG PER TABLET    Take 1 tablet by mouth every 6 hours as needed for Pain for up to 3 days. NAPROXEN (NAPROSYN) 500 MG TABLET    Take 1 tablet by mouth 2 times daily (with meals)    ONDANSETRON (ZOFRAN ODT) 4 MG DISINTEGRATING TABLET    Take 1-2 tablets by mouth every 12 hours as needed for Nausea May Sub regular tablet (non-ODT) if insurance does not cover ODT. TAMSULOSIN (FLOMAX) 0.4 MG CAPSULE    Take 1 capsule by mouth daily for 5 doses    . Instructed to follow-up with:  Ines Figueroa MD  [place] [id number]  [facility] [address] [place] [address]  [phone]    Schedule an appointment as soon as possible for a visit in 3 days  For a recheck    Jean Pierre Vasquez MD  [address] [place] [address]    Schedule an appointment as soon as possible for a visit in 3 days  For a recheck for left ovarian mass    Return to the ER for new or worsening symptoms. This plan was discussed with the patient and all family available in the room at discharge who are all in agreement with the plan. The patient tolerated their visit well. They were seen and evaluated by the attending physician, Coty Louise MD , who agreed with the assessment and plan. The patient and / or the family were informed of the results of any tests, a time was given to answer questions, a plan was proposed and they agreed with plan. FINAL IMPRESSION      1. Acute cystitis with hematuria    2. Obstructed, uropathy    3. Nephrolithiasis    4.  Mass of left ovary          DISPOSITION/PLAN   DISPOSITION Decision To Discharge 08/31/2019 03:13:39 PM        DISCONTINUED MEDICATIONS:  Discontinued Medications    No medications on file                (Please note that portions of this note were completed with a voice recognition program.  Efforts were made to edit the dictations but occasionally words are mis-transcribed.)    HELENA Gastelum CNP (electronically signed)        HELENA Gastelum CNP  08/31/19 6385

## 2019-09-01 LAB — URINE CULTURE, ROUTINE: NORMAL

## 2019-09-04 ENCOUNTER — HOSPITAL ENCOUNTER (OUTPATIENT)
Age: 64
Discharge: HOME | End: 2019-09-04
Payer: COMMERCIAL

## 2019-09-04 DIAGNOSIS — E78.2 MIXED HYPERLIPIDEMIA: ICD-10-CM

## 2019-09-04 DIAGNOSIS — E05.90 HYPERTHYROIDISM: ICD-10-CM

## 2019-09-04 DIAGNOSIS — I10 ESSENTIAL HYPERTENSION: ICD-10-CM

## 2019-09-04 DIAGNOSIS — E04.2 MULTINODULAR GOITER: ICD-10-CM

## 2019-09-04 LAB
A/G RATIO: 1.1 (ref 1.1–2.2)
ALBUMIN SERPL-MCNC: 3.7 G/DL (ref 3.4–5)
ALP BLD-CCNC: 132 U/L (ref 40–129)
ALT SERPL-CCNC: 9 U/L (ref 10–40)
ANION GAP SERPL CALCULATED.3IONS-SCNC: 13 MMOL/L (ref 3–16)
AST SERPL-CCNC: 16 U/L (ref 15–37)
BASOPHILS ABSOLUTE: 0.1 K/UL (ref 0–0.2)
BASOPHILS RELATIVE PERCENT: 0.8 %
BILIRUB SERPL-MCNC: 0.3 MG/DL (ref 0–1)
BUN BLDV-MCNC: 21 MG/DL (ref 7–20)
CALCIUM SERPL-MCNC: 9.2 MG/DL (ref 8.3–10.6)
CHLORIDE BLD-SCNC: 100 MMOL/L (ref 99–110)
CHOLESTEROL, TOTAL: 133 MG/DL (ref 0–199)
CO2: 25 MMOL/L (ref 21–32)
CREAT SERPL-MCNC: 0.8 MG/DL (ref 0.6–1.2)
CREATININE URINE: 95.7 MG/DL (ref 28–259)
EOSINOPHILS ABSOLUTE: 0.3 K/UL (ref 0–0.6)
EOSINOPHILS RELATIVE PERCENT: 3.7 %
ESTIMATED AVERAGE GLUCOSE: 159.9 MG/DL
GFR AFRICAN AMERICAN: >60
GFR NON-AFRICAN AMERICAN: >60
GLOBULIN: 3.4 G/DL
GLUCOSE BLD-MCNC: 102 MG/DL (ref 70–99)
HBA1C MFR BLD: 7.2 %
HCT VFR BLD CALC: 34.6 % (ref 36–48)
HDLC SERPL-MCNC: 66 MG/DL (ref 40–60)
HEMOGLOBIN: 11.6 G/DL (ref 12–16)
LDL CHOLESTEROL CALCULATED: 54 MG/DL
LYMPHOCYTES ABSOLUTE: 1.1 K/UL (ref 1–5.1)
LYMPHOCYTES RELATIVE PERCENT: 14.4 %
MCH RBC QN AUTO: 25.2 PG (ref 26–34)
MCHC RBC AUTO-ENTMCNC: 33.4 G/DL (ref 31–36)
MCV RBC AUTO: 75.5 FL (ref 80–100)
MICROALBUMIN UR-MCNC: 2.6 MG/DL
MICROALBUMIN/CREAT UR-RTO: 27.2 MG/G (ref 0–30)
MONOCYTES ABSOLUTE: 0.4 K/UL (ref 0–1.3)
MONOCYTES RELATIVE PERCENT: 4.8 %
NEUTROPHILS ABSOLUTE: 6 K/UL (ref 1.7–7.7)
NEUTROPHILS RELATIVE PERCENT: 76.3 %
PDW BLD-RTO: 16.9 % (ref 12.4–15.4)
PLATELET # BLD: 229 K/UL (ref 135–450)
PMV BLD AUTO: 8.6 FL (ref 5–10.5)
POTASSIUM SERPL-SCNC: 4.2 MMOL/L (ref 3.5–5.1)
RBC # BLD: 4.58 M/UL (ref 4–5.2)
SODIUM BLD-SCNC: 138 MMOL/L (ref 136–145)
T3 FREE: 2.5 PG/ML (ref 2.3–4.2)
T3 TOTAL: 1.15 NG/ML (ref 0.8–2)
T4 FREE: 1 NG/DL (ref 0.9–1.8)
T4 TOTAL: 8.2 UG/DL (ref 4.5–10.9)
TOTAL PROTEIN: 7.1 G/DL (ref 6.4–8.2)
TRIGL SERPL-MCNC: 64 MG/DL (ref 0–150)
TSH SERPL DL<=0.05 MIU/L-ACNC: 4.74 UIU/ML (ref 0.27–4.2)
VITAMIN D 25-HYDROXY: 54.8 NG/ML
VLDLC SERPL CALC-MCNC: 13 MG/DL
WBC # BLD: 7.9 K/UL (ref 4–11)

## 2019-09-04 PROCEDURE — 82043 UR ALBUMIN QUANTITATIVE: CPT

## 2019-09-04 PROCEDURE — 84439 ASSAY OF FREE THYROXINE: CPT

## 2019-09-04 PROCEDURE — 83036 HEMOGLOBIN GLYCOSYLATED A1C: CPT

## 2019-09-04 PROCEDURE — 82570 ASSAY OF URINE CREATININE: CPT

## 2019-09-04 PROCEDURE — 80053 COMPREHEN METABOLIC PANEL: CPT

## 2019-09-04 PROCEDURE — 36415 COLL VENOUS BLD VENIPUNCTURE: CPT

## 2019-09-04 PROCEDURE — 85025 COMPLETE CBC W/AUTO DIFF WBC: CPT

## 2019-09-04 PROCEDURE — 80061 LIPID PANEL: CPT

## 2019-09-04 PROCEDURE — 84481 FREE ASSAY (FT-3): CPT

## 2019-09-04 PROCEDURE — 84443 ASSAY THYROID STIM HORMONE: CPT

## 2019-09-04 PROCEDURE — 82306 VITAMIN D 25 HYDROXY: CPT

## 2019-09-09 PROBLEM — Z86.39 HISTORY OF HYPERTHYROIDISM: Status: ACTIVE | Noted: 2019-02-13

## 2019-09-09 PROBLEM — E89.0 POSTABLATIVE HYPOTHYROIDISM: Status: ACTIVE | Noted: 2019-09-09

## 2019-09-09 LAB
CALCULI COMPOSITION: NORMAL
MASS: 6 MG
STONE DESCRIPTION: NORMAL
STONE NUMBER: 4
STONE SIZE: NORMAL MM

## 2019-09-09 NOTE — PROGRESS NOTES
Arron Coker is a 61 y.o. female who presents for Type 2 diabetes mellitus. Current symptoms/problems include hyperglycemia and are worsening. 1.  DM type 2, uncontrolled, with neuropathy ([address] [E11.40, E11.65]    Diagnosed with Type 2 diabetes mellitus in . HbA1C 8.3-8.7  Comorbid conditions: hyperlipidemia and Neuropathy    Current diabetic medications include: metformin, glipizide, lantus 60 units qhs    Intolerance to diabetes medications: No  Had yeast infections. [facility] [place] may not be a good choice. Weight trend: stable  Prior visit with dietician: yes  Current diet: on average, 2-3 meals per day  Current exercise: walks     Current monitoring regimen: home blood tests - 2 times daily  Has brought blood glucose log/meter:  No  Home blood sugar records: fasting range: 100-120 and postprandial range:  100-180  Any episodes of hypoglycemia? Yes  Hypoglycemia frequency and time(s):  one episode in 3 months  Does patient have Glucagon emergency kit? No  Does patient have rapid acting carbohydrate? No  Does patient wear a medic alert bracelet or necklace? No    2. Mixed hyperlipidemia  No muscle pain. 3. Essential hypertension  No headaches. 4. Obesity  Tries to eat healthier. Lower carbs. 5. History of Hyperthyroidism      10.3 mCi 131 capsule was administered orally on on 2019.          TSH <0.005, FT4 1.41  TSH low since 2017 2.19-0.18-0.051-<005  No palpitations, anxiety, had sleep problems for a long time. No family Hx of thyroid problems. 6.  Multinodular Goiter  No thyroid cancer in family. No radiation in her neck. 7. Elevated alkaline phosphatase level  No bone pain. 8. Hypothyroidism  Has fatigue, similar to before.         Department of Pathology  FINAL CYTOLOGY REPORT  Patient Name: Roxanne Bravo               Accession No:  [id number]   Age Sex:   1955    63 Y / F       Location:      [facility]  Account No:   [de-identified]                  Collected:     uptake of 54%.  Increased uptake corresponds to the   dominant nodule in the right thyroid lobe with heterogeneous uptake in the   left thyroid lobe.  This could represent multinodular goiter or possibly a   nodular form of Graves disease.               Past Medical History:   Diagnosis Date    Hyperlipidemia     Hypertension     Kidney stone     Ovarian mass     Type II or unspecified type diabetes mellitus without mention of complication, not stated as uncontrolled       Patient Active Problem List   Diagnosis    HTN (hypertension)    Mixed hyperlipidemia    Abnormal EKG    DM type 2, uncontrolled, with neuropathy (HCC)    Class 2 obesity with body mass index (BMI) of 39.0 to 39.9 in adult    History of hyperthyroidism    Elevated alkaline phosphatase level    Multinodular goiter    Postablative hypothyroidism     Past Surgical History:   Procedure Laterality Date    HYSTERECTOMY       Social History     Socioeconomic History    Marital status: Single     Spouse name: Not on file    Number of children: Not on file    Years of education: Not on file    Highest education level: Not on file   Occupational History    Not on file   Social Needs    Financial resource strain: Not on file    Food insecurity:     Worry: Not on file     Inability: Not on file    Transportation needs:     Medical: Not on file     Non-medical: Not on file   Tobacco Use    Smoking status: Never Smoker    Smokeless tobacco: Never Used   Substance and Sexual Activity    Alcohol use: No    Drug use: No    Sexual activity: Never   Lifestyle    Physical activity:     Days per week: Not on file     Minutes per session: Not on file    Stress: Not on file   Relationships    Social connections:     Talks on phone: Not on file     Gets together: Not on file     Attends [employer] service: Not on file     Active member of club or organization: Not on file     Attends meetings of clubs or organizations: Not on file normal turgor  Head and Face: examination of head and face revealed no abnormalities  Eyes: no lid or conjunctival swelling, erythema or discharge, pupils are normal, equal, round, reactive to light  Ears/Nose: external inspection of ears and nose revealed no abnormalities, hearing is grossly normal  Oropharynx/Mouth/Face: lips, tongue and gums are normal with no lesions, the voice quality was normal  Neck: neck is supple and symmetric, with midline trachea and no masses, thyroid is normal  Lymphatics: normal cervical lymph nodes, normal supraclavicular nodes  Pulmonary: no increased work of breathing or signs of respiratory distress, lungs are clear to auscultation  Cardiovascular: normal heart rate and rhythm, normal S1 and S2, no murmurs and pedal pulses and 2+ bilaterally, No edema  Abdomen: abdomen is soft, non-tender with no masses  Musculoskeletal: normal gait and station and exam of the digits and nails are normal  Neurological: normal coordination and normal general cortical function, has mild hand tremor    Visual inspection:  Deformity/amputation: absent  Skin lesions/pre-ulcerative calluses: absent  Edema: right- negative, left- negative    Sensory exam:  Monofilament sensation: normal  (minimum of 5 random plantar locations tested, avoiding callused areas - > 1 area with absence of sensation is + for neuropathy)    Plus at least one of the following:  Pulses: normal  Proprioception: Intact  Vibration (128 Hz): Impaired    ASSESSMENT/PLAN:  1. DM type 2, uncontrolled, with neuropathy (HCC)  Hemoglobin A1c 8.7-7.8-7.2    Uncontrolled. Metformin  Glipizide   Lantus   Trulicity - did not take it. - Comprehensive Metabolic Panel; Future  - Hemoglobin A1C; Future  - C-Peptide; Future  - Microalbumin / Creatinine Urine Ratio; Future    2. Mixed hyperlipidemia  LDL 54  - Lipid Panel; Future    3. Essential hypertension  Continue current treatment    4. Obesity  Diet, exercise.     5. History of

## 2019-09-10 ENCOUNTER — OFFICE VISIT (OUTPATIENT)
Dept: ENDOCRINOLOGY | Age: 64
End: 2019-09-10
Payer: COMMERCIAL

## 2019-09-10 VITALS
BODY MASS INDEX: 38.39 KG/M2 | SYSTOLIC BLOOD PRESSURE: 136 MMHG | WEIGHT: 208.6 LBS | HEART RATE: 65 BPM | HEIGHT: 62 IN | DIASTOLIC BLOOD PRESSURE: 70 MMHG | OXYGEN SATURATION: 100 %

## 2019-09-10 DIAGNOSIS — E89.0 POSTABLATIVE HYPOTHYROIDISM: ICD-10-CM

## 2019-09-10 DIAGNOSIS — R74.8 ELEVATED ALKALINE PHOSPHATASE LEVEL: ICD-10-CM

## 2019-09-10 DIAGNOSIS — Z86.39 HISTORY OF HYPERTHYROIDISM: ICD-10-CM

## 2019-09-10 DIAGNOSIS — E66.01 CLASS 2 SEVERE OBESITY WITH SERIOUS COMORBIDITY AND BODY MASS INDEX (BMI) OF 39.0 TO 39.9 IN ADULT, UNSPECIFIED OBESITY TYPE (HCC): ICD-10-CM

## 2019-09-10 DIAGNOSIS — E78.2 MIXED HYPERLIPIDEMIA: ICD-10-CM

## 2019-09-10 DIAGNOSIS — I10 ESSENTIAL HYPERTENSION: ICD-10-CM

## 2019-09-10 DIAGNOSIS — E04.2 MULTINODULAR GOITER: ICD-10-CM

## 2019-09-10 PROCEDURE — 99214 OFFICE O/P EST MOD 30 MIN: CPT | Performed by: INTERNAL MEDICINE

## 2019-09-10 RX ORDER — LEVOTHYROXINE SODIUM 0.03 MG/1
25 TABLET ORAL DAILY
Qty: 30 TABLET | Refills: 1 | Status: SHIPPED | OUTPATIENT
Start: 2019-09-10 | End: 2019-11-06 | Stop reason: SDUPTHER

## 2019-10-10 ENCOUNTER — HOSPITAL ENCOUNTER (OUTPATIENT)
Age: 64
Discharge: HOME | End: 2019-10-10
Payer: COMMERCIAL

## 2019-10-10 DIAGNOSIS — E04.2 MULTINODULAR GOITER: ICD-10-CM

## 2019-10-10 DIAGNOSIS — E89.0 POSTABLATIVE HYPOTHYROIDISM: ICD-10-CM

## 2019-10-10 DIAGNOSIS — R74.8 ELEVATED ALKALINE PHOSPHATASE LEVEL: ICD-10-CM

## 2019-10-10 DIAGNOSIS — E78.2 MIXED HYPERLIPIDEMIA: ICD-10-CM

## 2019-10-10 LAB
T3 FREE: 2.9 PG/ML (ref 2.3–4.2)
T3 TOTAL: 1.32 NG/ML (ref 0.8–2)
T4 FREE: 1.3 NG/DL (ref 0.9–1.8)
T4 TOTAL: 9.6 UG/DL (ref 4.5–10.9)
TSH SERPL DL<=0.05 MIU/L-ACNC: 1.98 UIU/ML (ref 0.27–4.2)

## 2019-10-10 PROCEDURE — 84481 FREE ASSAY (FT-3): CPT

## 2019-10-10 PROCEDURE — 36415 COLL VENOUS BLD VENIPUNCTURE: CPT

## 2019-10-10 PROCEDURE — 84443 ASSAY THYROID STIM HORMONE: CPT

## 2019-10-10 PROCEDURE — 84439 ASSAY OF FREE THYROXINE: CPT

## 2019-11-06 RX ORDER — LEVOTHYROXINE SODIUM 0.03 MG/1
25 TABLET ORAL DAILY
Qty: 30 TABLET | Refills: 1 | Status: SHIPPED | OUTPATIENT
Start: 2019-11-06 | End: 2019-12-04 | Stop reason: SDUPTHER

## 2019-12-03 ENCOUNTER — HOSPITAL ENCOUNTER (OUTPATIENT)
Age: 64
Discharge: HOME | End: 2019-12-03
Payer: COMMERCIAL

## 2019-12-03 DIAGNOSIS — Z01.818 PREOP TESTING: ICD-10-CM

## 2019-12-03 LAB
ABO/RH: NORMAL
ANION GAP SERPL CALCULATED.3IONS-SCNC: 16 MMOL/L (ref 3–16)
ANTIBODY SCREEN: NORMAL
BUN BLDV-MCNC: 25 MG/DL (ref 7–20)
CALCIUM SERPL-MCNC: 9.5 MG/DL (ref 8.3–10.6)
CHLORIDE BLD-SCNC: 98 MMOL/L (ref 99–110)
CO2: 28 MMOL/L (ref 21–32)
CREAT SERPL-MCNC: 1 MG/DL (ref 0.6–1.2)
EKG ATRIAL RATE: 70 BPM
EKG DIAGNOSIS: NORMAL
EKG P AXIS: 16 DEGREES
EKG P-R INTERVAL: 168 MS
EKG Q-T INTERVAL: 434 MS
EKG QRS DURATION: 86 MS
EKG QTC CALCULATION (BAZETT): 468 MS
EKG R AXIS: -30 DEGREES
EKG T AXIS: 46 DEGREES
EKG VENTRICULAR RATE: 70 BPM
GFR AFRICAN AMERICAN: >60
GFR NON-AFRICAN AMERICAN: 56
GLUCOSE BLD-MCNC: 277 MG/DL (ref 70–99)
HCT VFR BLD CALC: 37.2 % (ref 36–48)
HEMOGLOBIN: 12 G/DL (ref 12–16)
MCH RBC QN AUTO: 25.6 PG (ref 26–34)
MCHC RBC AUTO-ENTMCNC: 32.4 G/DL (ref 31–36)
MCV RBC AUTO: 79.1 FL (ref 80–100)
PDW BLD-RTO: 16.5 % (ref 12.4–15.4)
PLATELET # BLD: 211 K/UL (ref 135–450)
PMV BLD AUTO: 8.9 FL (ref 5–10.5)
POTASSIUM SERPL-SCNC: 4.2 MMOL/L (ref 3.5–5.1)
RBC # BLD: 4.71 M/UL (ref 4–5.2)
SODIUM BLD-SCNC: 142 MMOL/L (ref 136–145)
WBC # BLD: 8.2 K/UL (ref 4–11)

## 2019-12-03 PROCEDURE — 86900 BLOOD TYPING SEROLOGIC ABO: CPT

## 2019-12-03 PROCEDURE — 93005 ELECTROCARDIOGRAM TRACING: CPT | Performed by: ANESTHESIOLOGY

## 2019-12-03 PROCEDURE — 86850 RBC ANTIBODY SCREEN: CPT

## 2019-12-03 PROCEDURE — 36415 COLL VENOUS BLD VENIPUNCTURE: CPT

## 2019-12-03 PROCEDURE — 86901 BLOOD TYPING SEROLOGIC RH(D): CPT

## 2019-12-03 PROCEDURE — 80048 BASIC METABOLIC PNL TOTAL CA: CPT

## 2019-12-03 PROCEDURE — 93010 ELECTROCARDIOGRAM REPORT: CPT | Performed by: INTERNAL MEDICINE

## 2019-12-03 PROCEDURE — 85027 COMPLETE CBC AUTOMATED: CPT

## 2019-12-03 RX ORDER — POTASSIUM CITRATE 10 MEQ/1
15 TABLET, EXTENDED RELEASE ORAL 2 TIMES DAILY
COMMUNITY
End: 2019-12-31 | Stop reason: DRUGHIGH

## 2019-12-04 RX ORDER — LEVOTHYROXINE SODIUM 0.03 MG/1
25 TABLET ORAL DAILY
Qty: 90 TABLET | Refills: 0 | Status: SHIPPED | OUTPATIENT
Start: 2019-12-04 | End: 2019-12-31

## 2019-12-06 ENCOUNTER — APPOINTMENT (OUTPATIENT)
Dept: CT IMAGING | Age: 64
End: 2019-12-06
Payer: COMMERCIAL

## 2019-12-06 ENCOUNTER — HOSPITAL ENCOUNTER (EMERGENCY)
Age: 64
Discharge: HOME | End: 2019-12-06
Attending: EMERGENCY MEDICINE
Payer: COMMERCIAL

## 2019-12-06 VITALS
WEIGHT: 208 LBS | SYSTOLIC BLOOD PRESSURE: 113 MMHG | BODY MASS INDEX: 38.28 KG/M2 | HEIGHT: 62 IN | OXYGEN SATURATION: 96 % | HEART RATE: 90 BPM | RESPIRATION RATE: 16 BRPM | DIASTOLIC BLOOD PRESSURE: 63 MMHG | TEMPERATURE: 98.4 F

## 2019-12-06 DIAGNOSIS — N30.00 ACUTE CYSTITIS WITHOUT HEMATURIA: ICD-10-CM

## 2019-12-06 DIAGNOSIS — N20.1 CALCULUS OF DISTAL RIGHT URETER: Primary | ICD-10-CM

## 2019-12-06 DIAGNOSIS — N13.1 HYDRONEPHROSIS DUE TO OBSTRUCTION OF URETER: ICD-10-CM

## 2019-12-06 LAB
A/G RATIO: 1.3 (ref 1.1–2.2)
ALBUMIN SERPL-MCNC: 4.3 G/DL (ref 3.4–5)
ALP BLD-CCNC: 127 U/L (ref 40–129)
ALT SERPL-CCNC: 6 U/L (ref 10–40)
ANION GAP SERPL CALCULATED.3IONS-SCNC: 12 MMOL/L (ref 3–16)
AST SERPL-CCNC: 14 U/L (ref 15–37)
BACTERIA: ABNORMAL /HPF
BASOPHILS ABSOLUTE: 0.1 K/UL (ref 0–0.2)
BASOPHILS RELATIVE PERCENT: 0.7 %
BILIRUB SERPL-MCNC: 0.5 MG/DL (ref 0–1)
BILIRUBIN URINE: NEGATIVE
BLOOD, URINE: NEGATIVE
BUN BLDV-MCNC: 23 MG/DL (ref 7–20)
CALCIUM SERPL-MCNC: 9.5 MG/DL (ref 8.3–10.6)
CHLORIDE BLD-SCNC: 97 MMOL/L (ref 99–110)
CLARITY: ABNORMAL
CO2: 29 MMOL/L (ref 21–32)
COLOR: YELLOW
CREAT SERPL-MCNC: 1.1 MG/DL (ref 0.6–1.2)
EOSINOPHILS ABSOLUTE: 0.1 K/UL (ref 0–0.6)
EOSINOPHILS RELATIVE PERCENT: 1.1 %
EPITHELIAL CELLS, UA: 3 /HPF (ref 0–5)
GFR AFRICAN AMERICAN: >60
GFR NON-AFRICAN AMERICAN: 50
GLOBULIN: 3.4 G/DL
GLUCOSE BLD-MCNC: 145 MG/DL (ref 70–99)
GLUCOSE URINE: NEGATIVE MG/DL
HCT VFR BLD CALC: 37.7 % (ref 36–48)
HEMOGLOBIN: 12.3 G/DL (ref 12–16)
HYALINE CASTS: 1 /LPF (ref 0–8)
KETONES, URINE: NEGATIVE MG/DL
LEUKOCYTE ESTERASE, URINE: ABNORMAL
LIPASE: 25 U/L (ref 13–60)
LYMPHOCYTES ABSOLUTE: 1.5 K/UL (ref 1–5.1)
LYMPHOCYTES RELATIVE PERCENT: 11.9 %
MCH RBC QN AUTO: 25.5 PG (ref 26–34)
MCHC RBC AUTO-ENTMCNC: 32.7 G/DL (ref 31–36)
MCV RBC AUTO: 77.9 FL (ref 80–100)
MICROSCOPIC EXAMINATION: YES
MONOCYTES ABSOLUTE: 0.7 K/UL (ref 0–1.3)
MONOCYTES RELATIVE PERCENT: 5.2 %
NEUTROPHILS ABSOLUTE: 10.1 K/UL (ref 1.7–7.7)
NEUTROPHILS RELATIVE PERCENT: 81.1 %
NITRITE, URINE: NEGATIVE
PDW BLD-RTO: 16.9 % (ref 12.4–15.4)
PH UA: 5.5 (ref 5–8)
PLATELET # BLD: 243 K/UL (ref 135–450)
PMV BLD AUTO: 8.3 FL (ref 5–10.5)
POTASSIUM SERPL-SCNC: 4 MMOL/L (ref 3.5–5.1)
PROTEIN UA: NEGATIVE MG/DL
RBC # BLD: 4.84 M/UL (ref 4–5.2)
RBC UA: 1 /HPF (ref 0–4)
SODIUM BLD-SCNC: 138 MMOL/L (ref 136–145)
SPECIFIC GRAVITY UA: 1.02 (ref 1–1.03)
TOTAL PROTEIN: 7.7 G/DL (ref 6.4–8.2)
URINE REFLEX TO CULTURE: YES
URINE TYPE: ABNORMAL
UROBILINOGEN, URINE: 0.2 E.U./DL
WBC # BLD: 12.4 K/UL (ref 4–11)
WBC UA: 23 /HPF (ref 0–5)

## 2019-12-06 PROCEDURE — 85025 COMPLETE CBC W/AUTO DIFF WBC: CPT

## 2019-12-06 PROCEDURE — 96376 TX/PRO/DX INJ SAME DRUG ADON: CPT

## 2019-12-06 PROCEDURE — 2580000003 HC RX 258: Performed by: PHYSICIAN ASSISTANT

## 2019-12-06 PROCEDURE — 80053 COMPREHEN METABOLIC PANEL: CPT

## 2019-12-06 PROCEDURE — 6360000002 HC RX W HCPCS: Performed by: PHYSICIAN ASSISTANT

## 2019-12-06 PROCEDURE — 74177 CT ABD & PELVIS W/CONTRAST: CPT

## 2019-12-06 PROCEDURE — 81001 URINALYSIS AUTO W/SCOPE: CPT

## 2019-12-06 PROCEDURE — 96374 THER/PROPH/DIAG INJ IV PUSH: CPT

## 2019-12-06 PROCEDURE — 96375 TX/PRO/DX INJ NEW DRUG ADDON: CPT

## 2019-12-06 PROCEDURE — 99284 EMERGENCY DEPT VISIT MOD MDM: CPT

## 2019-12-06 PROCEDURE — 87086 URINE CULTURE/COLONY COUNT: CPT

## 2019-12-06 PROCEDURE — 6370000000 HC RX 637 (ALT 250 FOR IP): Performed by: PHYSICIAN ASSISTANT

## 2019-12-06 PROCEDURE — 83690 ASSAY OF LIPASE: CPT

## 2019-12-06 PROCEDURE — 6360000004 HC RX CONTRAST MEDICATION: Performed by: PHYSICIAN ASSISTANT

## 2019-12-06 RX ORDER — ONDANSETRON 2 MG/ML
4 INJECTION INTRAMUSCULAR; INTRAVENOUS ONCE
Status: COMPLETED | OUTPATIENT
Start: 2019-12-06 | End: 2019-12-06

## 2019-12-06 RX ORDER — MORPHINE SULFATE 4 MG/ML
4 INJECTION, SOLUTION INTRAMUSCULAR; INTRAVENOUS ONCE
Status: COMPLETED | OUTPATIENT
Start: 2019-12-06 | End: 2019-12-06

## 2019-12-06 RX ORDER — ONDANSETRON 4 MG/1
4 TABLET, FILM COATED ORAL EVERY 8 HOURS PRN
Qty: 20 TABLET | Refills: 0 | Status: SHIPPED | OUTPATIENT
Start: 2019-12-06 | End: 2020-11-18

## 2019-12-06 RX ORDER — TAMSULOSIN HYDROCHLORIDE 0.4 MG/1
0.4 CAPSULE ORAL DAILY
Qty: 5 CAPSULE | Refills: 0 | Status: SHIPPED | OUTPATIENT
Start: 2019-12-06 | End: 2021-05-10

## 2019-12-06 RX ORDER — TRAMADOL HYDROCHLORIDE 50 MG/1
50 TABLET ORAL ONCE
Status: COMPLETED | OUTPATIENT
Start: 2019-12-06 | End: 2019-12-06

## 2019-12-06 RX ORDER — OXYCODONE HYDROCHLORIDE AND ACETAMINOPHEN 5; 325 MG/1; MG/1
1 TABLET ORAL ONCE
Status: COMPLETED | OUTPATIENT
Start: 2019-12-06 | End: 2019-12-06

## 2019-12-06 RX ORDER — 0.9 % SODIUM CHLORIDE 0.9 %
1000 INTRAVENOUS SOLUTION INTRAVENOUS ONCE
Status: COMPLETED | OUTPATIENT
Start: 2019-12-06 | End: 2019-12-06

## 2019-12-06 RX ORDER — HYDROCODONE BITARTRATE AND ACETAMINOPHEN 7.5; 325 MG/1; MG/1
1 TABLET ORAL ONCE
Status: DISCONTINUED | OUTPATIENT
Start: 2019-12-06 | End: 2019-12-06

## 2019-12-06 RX ORDER — HYDROCODONE BITARTRATE AND ACETAMINOPHEN 5; 325 MG/1; MG/1
1 TABLET ORAL EVERY 6 HOURS PRN
Qty: 10 TABLET | Refills: 0 | Status: SHIPPED | OUTPATIENT
Start: 2019-12-06 | End: 2019-12-09

## 2019-12-06 RX ORDER — CEFUROXIME AXETIL 250 MG/1
250 TABLET ORAL 2 TIMES DAILY
Qty: 20 TABLET | Refills: 0 | Status: SHIPPED | OUTPATIENT
Start: 2019-12-06 | End: 2019-12-16

## 2019-12-06 RX ADMIN — ONDANSETRON 4 MG: 2 INJECTION INTRAMUSCULAR; INTRAVENOUS at 13:40

## 2019-12-06 RX ADMIN — ONDANSETRON 4 MG: 2 INJECTION INTRAMUSCULAR; INTRAVENOUS at 15:56

## 2019-12-06 RX ADMIN — Medication 1 G: at 13:40

## 2019-12-06 RX ADMIN — MORPHINE SULFATE 4 MG: 4 INJECTION INTRAVENOUS at 13:40

## 2019-12-06 RX ADMIN — SODIUM CHLORIDE 1000 ML: 9 INJECTION, SOLUTION INTRAVENOUS at 13:40

## 2019-12-06 RX ADMIN — OXYCODONE HYDROCHLORIDE AND ACETAMINOPHEN 1 TABLET: 5; 325 TABLET ORAL at 14:39

## 2019-12-06 RX ADMIN — IOPAMIDOL 75 ML: 755 INJECTION, SOLUTION INTRAVENOUS at 13:48

## 2019-12-06 RX ADMIN — TRAMADOL HYDROCHLORIDE 50 MG: 50 TABLET, FILM COATED ORAL at 15:56

## 2019-12-06 ASSESSMENT — ENCOUNTER SYMPTOMS
DIARRHEA: 0
ANAL BLEEDING: 0
NAUSEA: 1
SHORTNESS OF BREATH: 0
RECTAL PAIN: 0
BLOOD IN STOOL: 0
COLOR CHANGE: 0
VOMITING: 0
ABDOMINAL PAIN: 0
CONSTIPATION: 0
BACK PAIN: 0
ABDOMINAL DISTENTION: 0

## 2019-12-06 ASSESSMENT — PAIN SCALES - GENERAL
PAINLEVEL_OUTOF10: 5
PAINLEVEL_OUTOF10: 5
PAINLEVEL_OUTOF10: 4
PAINLEVEL_OUTOF10: 5
PAINLEVEL_OUTOF10: 5

## 2019-12-06 ASSESSMENT — PAIN DESCRIPTION - ORIENTATION
ORIENTATION: RIGHT
ORIENTATION: RIGHT

## 2019-12-06 ASSESSMENT — PAIN DESCRIPTION - LOCATION
LOCATION: FLANK
LOCATION: FLANK

## 2019-12-06 ASSESSMENT — PAIN DESCRIPTION - DESCRIPTORS: DESCRIPTORS: ACHING

## 2019-12-06 ASSESSMENT — PAIN DESCRIPTION - PROGRESSION: CLINICAL_PROGRESSION: GRADUALLY IMPROVING

## 2019-12-06 ASSESSMENT — PAIN DESCRIPTION - PAIN TYPE
TYPE: ACUTE PAIN
TYPE: ACUTE PAIN

## 2019-12-06 ASSESSMENT — PAIN DESCRIPTION - FREQUENCY: FREQUENCY: CONTINUOUS

## 2019-12-06 ASSESSMENT — PAIN DESCRIPTION - ONSET: ONSET: ON-GOING

## 2019-12-07 LAB — URINE CULTURE, ROUTINE: NORMAL

## 2019-12-10 ENCOUNTER — HOSPITAL ENCOUNTER (OUTPATIENT)
Age: 64
Setting detail: OUTPATIENT SURGERY
Discharge: HOME | End: 2019-12-10
Attending: OBSTETRICS & GYNECOLOGY | Admitting: OBSTETRICS & GYNECOLOGY
Payer: COMMERCIAL

## 2019-12-10 ENCOUNTER — ANESTHESIA EVENT (OUTPATIENT)
Dept: OPERATING ROOM | Age: 64
End: 2019-12-10
Payer: COMMERCIAL

## 2019-12-10 ENCOUNTER — ANESTHESIA (OUTPATIENT)
Dept: OPERATING ROOM | Age: 64
End: 2019-12-10
Payer: COMMERCIAL

## 2019-12-10 VITALS
HEART RATE: 78 BPM | RESPIRATION RATE: 16 BRPM | WEIGHT: 206.13 LBS | TEMPERATURE: 97 F | SYSTOLIC BLOOD PRESSURE: 117 MMHG | HEIGHT: 62 IN | DIASTOLIC BLOOD PRESSURE: 70 MMHG | BODY MASS INDEX: 37.93 KG/M2 | OXYGEN SATURATION: 94 %

## 2019-12-10 VITALS
DIASTOLIC BLOOD PRESSURE: 74 MMHG | OXYGEN SATURATION: 96 % | RESPIRATION RATE: 16 BRPM | SYSTOLIC BLOOD PRESSURE: 147 MMHG | TEMPERATURE: 96.1 F

## 2019-12-10 DIAGNOSIS — G89.18 POST-OP PAIN: ICD-10-CM

## 2019-12-10 DIAGNOSIS — Z01.818 PREOP TESTING: Primary | ICD-10-CM

## 2019-12-10 LAB
ABO/RH: NORMAL
ANTIBODY SCREEN: NORMAL
GLUCOSE BLD-MCNC: 110 MG/DL (ref 70–99)
GLUCOSE BLD-MCNC: 159 MG/DL (ref 70–99)
PERFORMED ON: ABNORMAL
PERFORMED ON: ABNORMAL

## 2019-12-10 PROCEDURE — 7100000010 HC PHASE II RECOVERY - FIRST 15 MIN: Performed by: OBSTETRICS & GYNECOLOGY

## 2019-12-10 PROCEDURE — S2900 ROBOTIC SURGICAL SYSTEM: HCPCS | Performed by: OBSTETRICS & GYNECOLOGY

## 2019-12-10 PROCEDURE — 2580000003 HC RX 258: Performed by: OBSTETRICS & GYNECOLOGY

## 2019-12-10 PROCEDURE — 2709999900 HC NON-CHARGEABLE SUPPLY: Performed by: OBSTETRICS & GYNECOLOGY

## 2019-12-10 PROCEDURE — 86900 BLOOD TYPING SEROLOGIC ABO: CPT

## 2019-12-10 PROCEDURE — 2500000003 HC RX 250 WO HCPCS: Performed by: NURSE ANESTHETIST, CERTIFIED REGISTERED

## 2019-12-10 PROCEDURE — 3700000001 HC ADD 15 MINUTES (ANESTHESIA): Performed by: OBSTETRICS & GYNECOLOGY

## 2019-12-10 PROCEDURE — 6360000002 HC RX W HCPCS: Performed by: NURSE ANESTHETIST, CERTIFIED REGISTERED

## 2019-12-10 PROCEDURE — 7100000011 HC PHASE II RECOVERY - ADDTL 15 MIN: Performed by: OBSTETRICS & GYNECOLOGY

## 2019-12-10 PROCEDURE — 86901 BLOOD TYPING SEROLOGIC RH(D): CPT

## 2019-12-10 PROCEDURE — 3700000000 HC ANESTHESIA ATTENDED CARE: Performed by: OBSTETRICS & GYNECOLOGY

## 2019-12-10 PROCEDURE — 2720000010 HC SURG SUPPLY STERILE: Performed by: OBSTETRICS & GYNECOLOGY

## 2019-12-10 PROCEDURE — 3600000019 HC SURGERY ROBOT ADDTL 15MIN: Performed by: OBSTETRICS & GYNECOLOGY

## 2019-12-10 PROCEDURE — 7100000001 HC PACU RECOVERY - ADDTL 15 MIN: Performed by: OBSTETRICS & GYNECOLOGY

## 2019-12-10 PROCEDURE — 88305 TISSUE EXAM BY PATHOLOGIST: CPT

## 2019-12-10 PROCEDURE — 7100000000 HC PACU RECOVERY - FIRST 15 MIN: Performed by: OBSTETRICS & GYNECOLOGY

## 2019-12-10 PROCEDURE — 6370000000 HC RX 637 (ALT 250 FOR IP)

## 2019-12-10 PROCEDURE — 86850 RBC ANTIBODY SCREEN: CPT

## 2019-12-10 PROCEDURE — 3600000009 HC SURGERY ROBOT BASE: Performed by: OBSTETRICS & GYNECOLOGY

## 2019-12-10 PROCEDURE — 36415 COLL VENOUS BLD VENIPUNCTURE: CPT

## 2019-12-10 PROCEDURE — 6360000002 HC RX W HCPCS: Performed by: OBSTETRICS & GYNECOLOGY

## 2019-12-10 PROCEDURE — 6360000002 HC RX W HCPCS: Performed by: FAMILY MEDICINE

## 2019-12-10 PROCEDURE — 2500000003 HC RX 250 WO HCPCS: Performed by: OBSTETRICS & GYNECOLOGY

## 2019-12-10 RX ORDER — OXYCODONE HYDROCHLORIDE 5 MG/1
10 TABLET ORAL PRN
Status: DISCONTINUED | OUTPATIENT
Start: 2019-12-10 | End: 2019-12-10 | Stop reason: HOSPADM

## 2019-12-10 RX ORDER — SODIUM CHLORIDE, SODIUM LACTATE, POTASSIUM CHLORIDE, CALCIUM CHLORIDE 600; 310; 30; 20 MG/100ML; MG/100ML; MG/100ML; MG/100ML
INJECTION, SOLUTION INTRAVENOUS CONTINUOUS PRN
Status: COMPLETED | OUTPATIENT
Start: 2019-12-10 | End: 2019-12-10

## 2019-12-10 RX ORDER — OXYCODONE HYDROCHLORIDE AND ACETAMINOPHEN 5; 325 MG/1; MG/1
1 TABLET ORAL EVERY 6 HOURS PRN
Qty: 24 TABLET | Refills: 0 | Status: SHIPPED | OUTPATIENT
Start: 2019-12-10 | End: 2019-12-15

## 2019-12-10 RX ORDER — HYDROMORPHONE HCL 110MG/55ML
0.25 PATIENT CONTROLLED ANALGESIA SYRINGE INTRAVENOUS EVERY 5 MIN PRN
Status: DISCONTINUED | OUTPATIENT
Start: 2019-12-10 | End: 2019-12-10 | Stop reason: HOSPADM

## 2019-12-10 RX ORDER — MEPERIDINE HYDROCHLORIDE 25 MG/ML
12.5 INJECTION INTRAMUSCULAR; INTRAVENOUS; SUBCUTANEOUS EVERY 5 MIN PRN
Status: DISCONTINUED | OUTPATIENT
Start: 2019-12-10 | End: 2019-12-10 | Stop reason: HOSPADM

## 2019-12-10 RX ORDER — OXYCODONE HYDROCHLORIDE AND ACETAMINOPHEN 5; 325 MG/1; MG/1
1 TABLET ORAL ONCE
Status: COMPLETED | OUTPATIENT
Start: 2019-12-10 | End: 2019-12-10

## 2019-12-10 RX ORDER — OXYCODONE HYDROCHLORIDE AND ACETAMINOPHEN 5; 325 MG/1; MG/1
TABLET ORAL
Status: COMPLETED
Start: 2019-12-10 | End: 2019-12-10

## 2019-12-10 RX ORDER — FENTANYL CITRATE 50 UG/ML
INJECTION, SOLUTION INTRAMUSCULAR; INTRAVENOUS PRN
Status: DISCONTINUED | OUTPATIENT
Start: 2019-12-10 | End: 2019-12-10 | Stop reason: SDUPTHER

## 2019-12-10 RX ORDER — MAGNESIUM HYDROXIDE 1200 MG/15ML
LIQUID ORAL CONTINUOUS PRN
Status: COMPLETED | OUTPATIENT
Start: 2019-12-10 | End: 2019-12-10

## 2019-12-10 RX ORDER — SODIUM CHLORIDE 0.9 % (FLUSH) 0.9 %
10 SYRINGE (ML) INJECTION PRN
Status: CANCELLED | OUTPATIENT
Start: 2019-12-10

## 2019-12-10 RX ORDER — FENTANYL CITRATE 50 UG/ML
50 INJECTION, SOLUTION INTRAMUSCULAR; INTRAVENOUS EVERY 5 MIN PRN
Status: DISCONTINUED | OUTPATIENT
Start: 2019-12-10 | End: 2019-12-10 | Stop reason: HOSPADM

## 2019-12-10 RX ORDER — PROPOFOL 10 MG/ML
INJECTION, EMULSION INTRAVENOUS PRN
Status: DISCONTINUED | OUTPATIENT
Start: 2019-12-10 | End: 2019-12-10 | Stop reason: SDUPTHER

## 2019-12-10 RX ORDER — ROCURONIUM BROMIDE 10 MG/ML
INJECTION, SOLUTION INTRAVENOUS PRN
Status: DISCONTINUED | OUTPATIENT
Start: 2019-12-10 | End: 2019-12-10 | Stop reason: SDUPTHER

## 2019-12-10 RX ORDER — LIDOCAINE HYDROCHLORIDE 10 MG/ML
0.5 INJECTION, SOLUTION EPIDURAL; INFILTRATION; INTRACAUDAL; PERINEURAL ONCE
Status: DISCONTINUED | OUTPATIENT
Start: 2019-12-10 | End: 2019-12-10 | Stop reason: HOSPADM

## 2019-12-10 RX ORDER — CEFAZOLIN SODIUM 2 G/100ML
2 INJECTION, SOLUTION INTRAVENOUS
Status: COMPLETED | OUTPATIENT
Start: 2019-12-10 | End: 2019-12-10

## 2019-12-10 RX ORDER — DEXAMETHASONE SODIUM PHOSPHATE 4 MG/ML
INJECTION, SOLUTION INTRA-ARTICULAR; INTRALESIONAL; INTRAMUSCULAR; INTRAVENOUS; SOFT TISSUE PRN
Status: DISCONTINUED | OUTPATIENT
Start: 2019-12-10 | End: 2019-12-10 | Stop reason: SDUPTHER

## 2019-12-10 RX ORDER — SODIUM CHLORIDE 9 MG/ML
INJECTION, SOLUTION INTRAVENOUS CONTINUOUS
Status: CANCELLED | OUTPATIENT
Start: 2019-12-10

## 2019-12-10 RX ORDER — HYDROMORPHONE HCL 110MG/55ML
0.5 PATIENT CONTROLLED ANALGESIA SYRINGE INTRAVENOUS EVERY 5 MIN PRN
Status: DISCONTINUED | OUTPATIENT
Start: 2019-12-10 | End: 2019-12-10 | Stop reason: HOSPADM

## 2019-12-10 RX ORDER — BUPIVACAINE HYDROCHLORIDE AND EPINEPHRINE 5; 5 MG/ML; UG/ML
INJECTION, SOLUTION EPIDURAL; INTRACAUDAL; PERINEURAL
Status: COMPLETED | OUTPATIENT
Start: 2019-12-10 | End: 2019-12-10

## 2019-12-10 RX ORDER — DIPHENHYDRAMINE HYDROCHLORIDE 50 MG/ML
12.5 INJECTION INTRAMUSCULAR; INTRAVENOUS
Status: DISCONTINUED | OUTPATIENT
Start: 2019-12-10 | End: 2019-12-10 | Stop reason: HOSPADM

## 2019-12-10 RX ORDER — LABETALOL HYDROCHLORIDE 5 MG/ML
5 INJECTION, SOLUTION INTRAVENOUS EVERY 10 MIN PRN
Status: DISCONTINUED | OUTPATIENT
Start: 2019-12-10 | End: 2019-12-10 | Stop reason: HOSPADM

## 2019-12-10 RX ORDER — KETOROLAC TROMETHAMINE 30 MG/ML
INJECTION, SOLUTION INTRAMUSCULAR; INTRAVENOUS PRN
Status: DISCONTINUED | OUTPATIENT
Start: 2019-12-10 | End: 2019-12-10 | Stop reason: SDUPTHER

## 2019-12-10 RX ORDER — SUCCINYLCHOLINE CHLORIDE 20 MG/ML
INJECTION INTRAMUSCULAR; INTRAVENOUS PRN
Status: DISCONTINUED | OUTPATIENT
Start: 2019-12-10 | End: 2019-12-10 | Stop reason: SDUPTHER

## 2019-12-10 RX ORDER — SODIUM CHLORIDE 0.9 % (FLUSH) 0.9 %
10 SYRINGE (ML) INJECTION EVERY 12 HOURS SCHEDULED
Status: CANCELLED | OUTPATIENT
Start: 2019-12-10

## 2019-12-10 RX ORDER — ONDANSETRON 2 MG/ML
INJECTION INTRAMUSCULAR; INTRAVENOUS PRN
Status: DISCONTINUED | OUTPATIENT
Start: 2019-12-10 | End: 2019-12-10 | Stop reason: SDUPTHER

## 2019-12-10 RX ORDER — SODIUM CHLORIDE 9 MG/ML
INJECTION, SOLUTION INTRAVENOUS CONTINUOUS
Status: DISCONTINUED | OUTPATIENT
Start: 2019-12-10 | End: 2019-12-10 | Stop reason: HOSPADM

## 2019-12-10 RX ORDER — PROMETHAZINE HYDROCHLORIDE 25 MG/ML
6.25 INJECTION, SOLUTION INTRAMUSCULAR; INTRAVENOUS PRN
Status: DISCONTINUED | OUTPATIENT
Start: 2019-12-10 | End: 2019-12-10 | Stop reason: HOSPADM

## 2019-12-10 RX ORDER — IBUPROFEN 800 MG/1
800 TABLET ORAL EVERY 6 HOURS PRN
Qty: 50 TABLET | Refills: 3 | Status: SHIPPED | OUTPATIENT
Start: 2019-12-10 | End: 2020-11-18

## 2019-12-10 RX ORDER — OXYCODONE HYDROCHLORIDE 5 MG/1
5 TABLET ORAL PRN
Status: DISCONTINUED | OUTPATIENT
Start: 2019-12-10 | End: 2019-12-10 | Stop reason: HOSPADM

## 2019-12-10 RX ORDER — MIDAZOLAM HYDROCHLORIDE 1 MG/ML
INJECTION INTRAMUSCULAR; INTRAVENOUS PRN
Status: DISCONTINUED | OUTPATIENT
Start: 2019-12-10 | End: 2019-12-10 | Stop reason: SDUPTHER

## 2019-12-10 RX ORDER — LIDOCAINE HYDROCHLORIDE 20 MG/ML
INJECTION, SOLUTION INFILTRATION; PERINEURAL PRN
Status: DISCONTINUED | OUTPATIENT
Start: 2019-12-10 | End: 2019-12-10 | Stop reason: SDUPTHER

## 2019-12-10 RX ADMIN — OXYCODONE HYDROCHLORIDE AND ACETAMINOPHEN 1 TABLET: 5; 325 TABLET ORAL at 11:01

## 2019-12-10 RX ADMIN — DEXAMETHASONE SODIUM PHOSPHATE 8 MG: 4 INJECTION, SOLUTION INTRAMUSCULAR; INTRAVENOUS at 07:50

## 2019-12-10 RX ADMIN — HYDROMORPHONE HYDROCHLORIDE 0.5 MG: 2 INJECTION, SOLUTION INTRAMUSCULAR; INTRAVENOUS; SUBCUTANEOUS at 08:01

## 2019-12-10 RX ADMIN — MIDAZOLAM 2 MG: 1 INJECTION INTRAMUSCULAR; INTRAVENOUS at 07:29

## 2019-12-10 RX ADMIN — KETOROLAC TROMETHAMINE 15 MG: 30 INJECTION, SOLUTION INTRAMUSCULAR; INTRAVENOUS at 08:36

## 2019-12-10 RX ADMIN — ONDANSETRON 4 MG: 2 INJECTION INTRAMUSCULAR; INTRAVENOUS at 07:33

## 2019-12-10 RX ADMIN — ROCURONIUM BROMIDE 5 MG: 10 INJECTION, SOLUTION INTRAVENOUS at 07:37

## 2019-12-10 RX ADMIN — SODIUM CHLORIDE: 9 INJECTION, SOLUTION INTRAVENOUS at 07:18

## 2019-12-10 RX ADMIN — PHENYLEPHRINE HYDROCHLORIDE 100 MCG: 10 INJECTION INTRAVENOUS at 07:49

## 2019-12-10 RX ADMIN — CEFAZOLIN SODIUM 2 G: 2 INJECTION, SOLUTION INTRAVENOUS at 07:21

## 2019-12-10 RX ADMIN — FENTANYL CITRATE 100 MCG: 50 INJECTION, SOLUTION INTRAMUSCULAR; INTRAVENOUS at 07:33

## 2019-12-10 RX ADMIN — SUCCINYLCHOLINE CHLORIDE 140 MG: 20 INJECTION, SOLUTION INTRAMUSCULAR; INTRAVENOUS at 07:37

## 2019-12-10 RX ADMIN — PROPOFOL 160 MG: 10 INJECTION, EMULSION INTRAVENOUS at 07:36

## 2019-12-10 RX ADMIN — LIDOCAINE HYDROCHLORIDE 50 MG: 20 INJECTION, SOLUTION INFILTRATION; PERINEURAL at 07:35

## 2019-12-10 RX ADMIN — SODIUM CHLORIDE: 9 INJECTION, SOLUTION INTRAVENOUS at 08:44

## 2019-12-10 RX ADMIN — ROCURONIUM BROMIDE 35 MG: 10 INJECTION, SOLUTION INTRAVENOUS at 07:46

## 2019-12-10 RX ADMIN — SUGAMMADEX 150 MG: 100 INJECTION, SOLUTION INTRAVENOUS at 08:36

## 2019-12-10 ASSESSMENT — PULMONARY FUNCTION TESTS
PIF_VALUE: 22
PIF_VALUE: 6
PIF_VALUE: 2
PIF_VALUE: 35
PIF_VALUE: 36
PIF_VALUE: 22
PIF_VALUE: 1
PIF_VALUE: 22
PIF_VALUE: 22
PIF_VALUE: 2
PIF_VALUE: 20
PIF_VALUE: 36
PIF_VALUE: 21
PIF_VALUE: 36
PIF_VALUE: 30
PIF_VALUE: 35
PIF_VALUE: 27
PIF_VALUE: 35
PIF_VALUE: 36
PIF_VALUE: 34
PIF_VALUE: 21
PIF_VALUE: 20
PIF_VALUE: 2
PIF_VALUE: 32
PIF_VALUE: 32
PIF_VALUE: 35
PIF_VALUE: 9
PIF_VALUE: 32
PIF_VALUE: 35
PIF_VALUE: 37
PIF_VALUE: 21
PIF_VALUE: 37
PIF_VALUE: 20
PIF_VALUE: 20
PIF_VALUE: 29
PIF_VALUE: 3
PIF_VALUE: 28
PIF_VALUE: 22
PIF_VALUE: 22
PIF_VALUE: 36
PIF_VALUE: 22
PIF_VALUE: 6
PIF_VALUE: 36
PIF_VALUE: 29
PIF_VALUE: 36
PIF_VALUE: 31
PIF_VALUE: 21
PIF_VALUE: 21
PIF_VALUE: 30
PIF_VALUE: 37
PIF_VALUE: 36
PIF_VALUE: 36
PIF_VALUE: 35
PIF_VALUE: 36
PIF_VALUE: 23
PIF_VALUE: 2
PIF_VALUE: 36
PIF_VALUE: 18
PIF_VALUE: 36
PIF_VALUE: 2
PIF_VALUE: 3
PIF_VALUE: 21
PIF_VALUE: 31
PIF_VALUE: 22
PIF_VALUE: 20
PIF_VALUE: 36
PIF_VALUE: 30
PIF_VALUE: 36
PIF_VALUE: 27
PIF_VALUE: 22
PIF_VALUE: 36

## 2019-12-10 ASSESSMENT — PAIN SCALES - GENERAL: PAINLEVEL_OUTOF10: 5

## 2019-12-14 LAB
CALCULI COMPOSITION: NORMAL
MASS: 12 MG
STONE DESCRIPTION: NORMAL
STONE NUMBER: 3
STONE SIZE: NORMAL MM

## 2019-12-28 ENCOUNTER — HOSPITAL ENCOUNTER (OUTPATIENT)
Age: 64
Discharge: HOME | End: 2019-12-28
Payer: COMMERCIAL

## 2019-12-28 DIAGNOSIS — E04.2 MULTINODULAR GOITER: ICD-10-CM

## 2019-12-28 DIAGNOSIS — R74.8 ELEVATED ALKALINE PHOSPHATASE LEVEL: ICD-10-CM

## 2019-12-28 DIAGNOSIS — E89.0 POSTABLATIVE HYPOTHYROIDISM: ICD-10-CM

## 2019-12-28 DIAGNOSIS — E78.2 MIXED HYPERLIPIDEMIA: ICD-10-CM

## 2019-12-28 LAB
A/G RATIO: 1.3 (ref 1.1–2.2)
ALBUMIN SERPL-MCNC: 3.9 G/DL (ref 3.4–5)
ALP BLD-CCNC: 136 U/L (ref 40–129)
ALT SERPL-CCNC: 8 U/L (ref 10–40)
ANION GAP SERPL CALCULATED.3IONS-SCNC: 15 MMOL/L (ref 3–16)
AST SERPL-CCNC: 16 U/L (ref 15–37)
BILIRUB SERPL-MCNC: 0.4 MG/DL (ref 0–1)
BUN BLDV-MCNC: 26 MG/DL (ref 7–20)
CALCIUM SERPL-MCNC: 9.4 MG/DL (ref 8.3–10.6)
CHLORIDE BLD-SCNC: 99 MMOL/L (ref 99–110)
CHOLESTEROL, TOTAL: 161 MG/DL (ref 0–199)
CO2: 26 MMOL/L (ref 21–32)
CREAT SERPL-MCNC: 1.1 MG/DL (ref 0.6–1.2)
GFR AFRICAN AMERICAN: >60
GFR NON-AFRICAN AMERICAN: 50
GLOBULIN: 3 G/DL
GLUCOSE BLD-MCNC: 123 MG/DL (ref 70–99)
HDLC SERPL-MCNC: 62 MG/DL (ref 40–60)
LDL CHOLESTEROL CALCULATED: 81 MG/DL
POTASSIUM SERPL-SCNC: 4.5 MMOL/L (ref 3.5–5.1)
SODIUM BLD-SCNC: 140 MMOL/L (ref 136–145)
T3 FREE: 2.2 PG/ML (ref 2.3–4.2)
T3 TOTAL: 1.03 NG/ML (ref 0.8–2)
T4 FREE: 0.8 NG/DL (ref 0.9–1.8)
T4 TOTAL: 6.9 UG/DL (ref 4.5–10.9)
TOTAL PROTEIN: 6.9 G/DL (ref 6.4–8.2)
TRIGL SERPL-MCNC: 91 MG/DL (ref 0–150)
TSH SERPL DL<=0.05 MIU/L-ACNC: 18.8 UIU/ML (ref 0.27–4.2)
VITAMIN D 25-HYDROXY: 53.2 NG/ML
VLDLC SERPL CALC-MCNC: 18 MG/DL

## 2019-12-28 PROCEDURE — 84443 ASSAY THYROID STIM HORMONE: CPT

## 2019-12-28 PROCEDURE — 36415 COLL VENOUS BLD VENIPUNCTURE: CPT

## 2019-12-28 PROCEDURE — 82306 VITAMIN D 25 HYDROXY: CPT

## 2019-12-28 PROCEDURE — 83036 HEMOGLOBIN GLYCOSYLATED A1C: CPT

## 2019-12-28 PROCEDURE — 80053 COMPREHEN METABOLIC PANEL: CPT

## 2019-12-28 PROCEDURE — 84439 ASSAY OF FREE THYROXINE: CPT

## 2019-12-28 PROCEDURE — 80061 LIPID PANEL: CPT

## 2019-12-28 PROCEDURE — 84481 FREE ASSAY (FT-3): CPT

## 2019-12-29 LAB
ESTIMATED AVERAGE GLUCOSE: 157.1 MG/DL
HBA1C MFR BLD: 7.1 %

## 2019-12-31 ENCOUNTER — OFFICE VISIT (OUTPATIENT)
Dept: ENDOCRINOLOGY | Age: 64
End: 2019-12-31
Payer: COMMERCIAL

## 2019-12-31 VITALS
BODY MASS INDEX: 38.94 KG/M2 | WEIGHT: 211.6 LBS | DIASTOLIC BLOOD PRESSURE: 71 MMHG | HEIGHT: 62 IN | HEART RATE: 72 BPM | SYSTOLIC BLOOD PRESSURE: 139 MMHG | OXYGEN SATURATION: 97 %

## 2019-12-31 DIAGNOSIS — E89.0 POSTABLATIVE HYPOTHYROIDISM: ICD-10-CM

## 2019-12-31 DIAGNOSIS — R74.8 ELEVATED ALKALINE PHOSPHATASE LEVEL: ICD-10-CM

## 2019-12-31 DIAGNOSIS — Z86.39 HISTORY OF HYPERTHYROIDISM: ICD-10-CM

## 2019-12-31 DIAGNOSIS — E04.2 MULTINODULAR GOITER: ICD-10-CM

## 2019-12-31 DIAGNOSIS — E66.01 CLASS 2 SEVERE OBESITY WITH SERIOUS COMORBIDITY AND BODY MASS INDEX (BMI) OF 38.0 TO 38.9 IN ADULT, UNSPECIFIED OBESITY TYPE (HCC): ICD-10-CM

## 2019-12-31 DIAGNOSIS — I10 ESSENTIAL HYPERTENSION: ICD-10-CM

## 2019-12-31 DIAGNOSIS — E78.2 MIXED HYPERLIPIDEMIA: ICD-10-CM

## 2019-12-31 PROCEDURE — 99214 OFFICE O/P EST MOD 30 MIN: CPT | Performed by: INTERNAL MEDICINE

## 2019-12-31 RX ORDER — POTASSIUM CITRATE 15 MEQ/1
TABLET, EXTENDED RELEASE ORAL
COMMUNITY
End: 2020-02-27

## 2019-12-31 RX ORDER — GLIPIZIDE 10 MG/1
10 TABLET, FILM COATED, EXTENDED RELEASE ORAL 2 TIMES DAILY
Qty: 180 TABLET | Refills: 1 | Status: SHIPPED | OUTPATIENT
Start: 2019-12-31 | End: 2020-02-27 | Stop reason: SDUPTHER

## 2019-12-31 RX ORDER — METFORMIN HYDROCHLORIDE 500 MG/1
2000 TABLET, EXTENDED RELEASE ORAL DAILY
Qty: 360 TABLET | Refills: 1 | Status: SHIPPED | OUTPATIENT
Start: 2019-12-31 | End: 2020-05-22 | Stop reason: SDUPTHER

## 2019-12-31 RX ORDER — LEVOTHYROXINE SODIUM 88 UG/1
88 TABLET ORAL DAILY
Qty: 30 TABLET | Refills: 1 | Status: SHIPPED | OUTPATIENT
Start: 2019-12-31 | End: 2020-01-09 | Stop reason: SDUPTHER

## 2020-01-09 ENCOUNTER — TELEPHONE (OUTPATIENT)
Dept: ENDOCRINOLOGY | Age: 65
End: 2020-01-09

## 2020-01-09 RX ORDER — LEVOTHYROXINE SODIUM 0.03 MG/1
50 TABLET ORAL DAILY
Qty: 60 TABLET | Refills: 0
Start: 2020-01-09 | End: 2020-02-06 | Stop reason: SDUPTHER

## 2020-01-09 NOTE — TELEPHONE ENCOUNTER
TSH was 18 and levothyroxine was increased to 0.088 mg. Patient feels palpitations. She is currently taking 0.025 mg daily for 3 days. I instructed her to take levothyroxine 0.0 25 mg for next 3 days, then increase to 0.05 mg and obtain blood work in 2 weeks. She will call for results. Please mail lab order to patient. Patient has enough of medication to last for now. She will take 2 of 0.025 mg tablets daily.

## 2020-01-09 NOTE — TELEPHONE ENCOUNTER
PT states she was under the impression that her dose for levothyroxine changed from 25mcg to 75mcg but when she picked up her refill it was for 88mcg. She took it anyway and has been having heart palpitations at an accelerated rate so she took herself off the 88 and went back to the 25mcg. She is requesting a call to clarify her correct dosage. If calling before 4pm call work number [phone], after 4pm [facility] cell [phone].

## 2020-01-28 ENCOUNTER — HOSPITAL ENCOUNTER (OUTPATIENT)
Age: 65
Discharge: HOME | End: 2020-01-28
Payer: COMMERCIAL

## 2020-01-28 LAB
T4 FREE: 1.1 NG/DL (ref 0.9–1.8)
TSH SERPL DL<=0.05 MIU/L-ACNC: 11.7 UIU/ML (ref 0.27–4.2)

## 2020-01-28 PROCEDURE — 84443 ASSAY THYROID STIM HORMONE: CPT

## 2020-01-28 PROCEDURE — 84439 ASSAY OF FREE THYROXINE: CPT

## 2020-01-28 PROCEDURE — 36415 COLL VENOUS BLD VENIPUNCTURE: CPT

## 2020-02-04 ENCOUNTER — TELEPHONE (OUTPATIENT)
Dept: ENDOCRINOLOGY | Age: 65
End: 2020-02-04

## 2020-02-06 RX ORDER — LEVOTHYROXINE SODIUM 88 UG/1
TABLET ORAL
Qty: 30 TABLET | Refills: 0
Start: 2020-02-06 | End: 2020-02-27 | Stop reason: SDUPTHER

## 2020-02-20 ENCOUNTER — HOSPITAL ENCOUNTER (OUTPATIENT)
Age: 65
Discharge: HOME | End: 2020-02-20
Payer: COMMERCIAL

## 2020-02-20 LAB
A/G RATIO: 1.4 (ref 1.1–2.2)
ALBUMIN SERPL-MCNC: 4.1 G/DL (ref 3.4–5)
ALP BLD-CCNC: 124 U/L (ref 40–129)
ALT SERPL-CCNC: 7 U/L (ref 10–40)
ANION GAP SERPL CALCULATED.3IONS-SCNC: 13 MMOL/L (ref 3–16)
AST SERPL-CCNC: 15 U/L (ref 15–37)
BASOPHILS ABSOLUTE: 0.1 K/UL (ref 0–0.2)
BASOPHILS RELATIVE PERCENT: 0.7 %
BILIRUB SERPL-MCNC: 0.3 MG/DL (ref 0–1)
BUN BLDV-MCNC: 28 MG/DL (ref 7–20)
C-REACTIVE PROTEIN: 15.6 MG/L (ref 0–5.1)
CALCIUM SERPL-MCNC: 9.3 MG/DL (ref 8.3–10.6)
CHLORIDE BLD-SCNC: 101 MMOL/L (ref 99–110)
CHOLESTEROL, TOTAL: 162 MG/DL (ref 0–199)
CO2: 28 MMOL/L (ref 21–32)
CREAT SERPL-MCNC: 0.8 MG/DL (ref 0.6–1.2)
EOSINOPHILS ABSOLUTE: 0.5 K/UL (ref 0–0.6)
EOSINOPHILS RELATIVE PERCENT: 5.3 %
ESTIMATED AVERAGE GLUCOSE: 159.9 MG/DL
GFR AFRICAN AMERICAN: >60
GFR NON-AFRICAN AMERICAN: >60
GLOBULIN: 3 G/DL
GLUCOSE BLD-MCNC: 85 MG/DL (ref 70–99)
HBA1C MFR BLD: 7.2 %
HCT VFR BLD CALC: 36.2 % (ref 36–48)
HDLC SERPL-MCNC: 57 MG/DL (ref 40–60)
HEMOGLOBIN: 11.7 G/DL (ref 12–16)
LDL CHOLESTEROL CALCULATED: 89 MG/DL
LYMPHOCYTES ABSOLUTE: 1.8 K/UL (ref 1–5.1)
LYMPHOCYTES RELATIVE PERCENT: 20.3 %
MCH RBC QN AUTO: 25.3 PG (ref 26–34)
MCHC RBC AUTO-ENTMCNC: 32.2 G/DL (ref 31–36)
MCV RBC AUTO: 78.5 FL (ref 80–100)
MONOCYTES ABSOLUTE: 0.5 K/UL (ref 0–1.3)
MONOCYTES RELATIVE PERCENT: 5.4 %
NEUTROPHILS ABSOLUTE: 6.2 K/UL (ref 1.7–7.7)
NEUTROPHILS RELATIVE PERCENT: 68.3 %
PDW BLD-RTO: 16.1 % (ref 12.4–15.4)
PLATELET # BLD: 198 K/UL (ref 135–450)
PMV BLD AUTO: 8.6 FL (ref 5–10.5)
POTASSIUM SERPL-SCNC: 3.9 MMOL/L (ref 3.5–5.1)
RBC # BLD: 4.61 M/UL (ref 4–5.2)
SEDIMENTATION RATE, ERYTHROCYTE: 46 MM/HR (ref 0–30)
SODIUM BLD-SCNC: 142 MMOL/L (ref 136–145)
T3 FREE: 2.6 PG/ML (ref 2.3–4.2)
T4 FREE: 1.4 NG/DL (ref 0.9–1.8)
TOTAL PROTEIN: 7.1 G/DL (ref 6.4–8.2)
TRIGL SERPL-MCNC: 81 MG/DL (ref 0–150)
TSH SERPL DL<=0.05 MIU/L-ACNC: 4.07 UIU/ML (ref 0.27–4.2)
VLDLC SERPL CALC-MCNC: 16 MG/DL
WBC # BLD: 9.1 K/UL (ref 4–11)

## 2020-02-20 PROCEDURE — 80053 COMPREHEN METABOLIC PANEL: CPT

## 2020-02-20 PROCEDURE — 84443 ASSAY THYROID STIM HORMONE: CPT

## 2020-02-20 PROCEDURE — 86140 C-REACTIVE PROTEIN: CPT

## 2020-02-20 PROCEDURE — 80061 LIPID PANEL: CPT

## 2020-02-20 PROCEDURE — 85652 RBC SED RATE AUTOMATED: CPT

## 2020-02-20 PROCEDURE — 84439 ASSAY OF FREE THYROXINE: CPT

## 2020-02-20 PROCEDURE — 84481 FREE ASSAY (FT-3): CPT

## 2020-02-20 PROCEDURE — 36415 COLL VENOUS BLD VENIPUNCTURE: CPT

## 2020-02-20 PROCEDURE — 85025 COMPLETE CBC W/AUTO DIFF WBC: CPT

## 2020-02-20 PROCEDURE — 83036 HEMOGLOBIN GLYCOSYLATED A1C: CPT

## 2020-02-27 ENCOUNTER — OFFICE VISIT (OUTPATIENT)
Dept: ENDOCRINOLOGY | Age: 65
End: 2020-02-27
Payer: COMMERCIAL

## 2020-02-27 VITALS
DIASTOLIC BLOOD PRESSURE: 69 MMHG | SYSTOLIC BLOOD PRESSURE: 130 MMHG | HEIGHT: 62 IN | OXYGEN SATURATION: 99 % | WEIGHT: 214.2 LBS | BODY MASS INDEX: 39.42 KG/M2 | HEART RATE: 71 BPM

## 2020-02-27 PROCEDURE — 3051F HG A1C>EQUAL 7.0%<8.0%: CPT | Performed by: INTERNAL MEDICINE

## 2020-02-27 PROCEDURE — 99214 OFFICE O/P EST MOD 30 MIN: CPT | Performed by: INTERNAL MEDICINE

## 2020-02-27 RX ORDER — GLIPIZIDE 10 MG/1
10 TABLET, FILM COATED, EXTENDED RELEASE ORAL 2 TIMES DAILY
Qty: 180 TABLET | Refills: 1 | Status: SHIPPED | OUTPATIENT
Start: 2020-02-27 | End: 2020-05-22 | Stop reason: SDUPTHER

## 2020-02-27 RX ORDER — LEVOTHYROXINE SODIUM 88 UG/1
88 TABLET ORAL DAILY
Qty: 30 TABLET | Refills: 0 | Status: SHIPPED | OUTPATIENT
Start: 2020-02-27 | End: 2020-03-24 | Stop reason: SDUPTHER

## 2020-02-27 NOTE — PROGRESS NOTES
Arcadio Santana is a 59 y.o. female who presents for Type 2 diabetes mellitus. Current symptoms/problems include hyperglycemia and are worsening. 1.  DM type 2, uncontrolled, with neuropathy ([facility] [address] [E11.40, E11.65]    Diagnosed with Type 2 diabetes mellitus in . HbA1C 8.3-8.7  Comorbid conditions: hyperlipidemia and Neuropathy    Current diabetic medications include: metformin, glipizide, lantus 60 units qhs    Had ovarian tumor removed and hysterectomy. Had kidney stones. Intolerance to diabetes medications: No  Had yeast infections. Samella Bliss may not be a good choice. Weight trend: stable  Prior visit with dietician: yes  Current diet: on average, 2-3 meals per day  Current exercise: walks     Current monitoring regimen: home blood tests - 2 times daily  Has brought blood glucose log/meter:  No  Home blood sugar records: fasting range: 860-130 and postprandial range: 100-130  Any episodes of hypoglycemia? Yes  Hypoglycemia frequency and time(s):  one episode in 3 months  Does patient have Glucagon emergency kit? No  Does patient have rapid acting carbohydrate? No  Does patient wear a medic alert bracelet or necklace? No    2. Mixed hyperlipidemia  No muscle pain. 3. Essential hypertension  No headaches. 4. Obesity  Tries to eat healthier. Lower carbs. 5. History of Hyperthyroidism      10.3 mCi 131 capsule was administered orally on on 2019.          TSH <0.005, FT4 1.41  TSH low since 2017 2.19-0.18-0.051-<005  No palpitations, anxiety, had sleep problems for a long time. No family Hx of thyroid problems. 6.  Multinodular Goiter  No thyroid cancer in family. No radiation in her neck. 7. Elevated alkaline phosphatase level  No bone pain. 8. Hypothyroidism  Has fatigue, similar to before.         Department of Pathology  FINAL CYTOLOGY REPORT  Patient Name: Marion Townsend               Accession No:  [id number]   Age Sex:   1955    63 Y / F       Location:      [facility]  Account No:   [de-identified]                  Collected:     03/26/2019  Med Rec No:    [id number]                 Received:      03/26/2019  Attend Phys:   Chelo Joseph MD            Completed:     03/27/2019  Perform Phys: Chelo Joseph MD    FINAL DIAGNOSIS:    Thyroid, Right Nodule Fine Needle Aspiration:     - Non-diangostic    COMMENT:    Insufficient well stained/preserved follicular cells present  for analysis (<6 groups of 10).    KIRSE/KIR    CLINICAL DIAGNOSIS:    Diagnosis: E04.1    SPECIMEN:  THYROID, FNA: RIGHT THYROID NODULE 1.4X1.7 C.M.    GROSS DESCRIPTION:  Received are 10 smeared slides and a specimen in  cytolyt.  KIRSE/KIRSE    MICROSCOPIC DESCRIPTION: Microscopic examination performed, including a  monolayer slide and H&E stained cell block section. CPT: [id number] X1   [id number] X1    Case signed out at [address]  Specimen was processed and screened at [place] [address]      EXAMINATION:   I123 Thyroid Uptake and Scan. 3/13/2019 8:10 am       TECHNIQUE:   318 microcuries I123 sodium iodide was administered p. o. Then, thyroid   uptake measurements were performed at 4 and 24 hours.  Pinhole images of the   thyroid were obtained at 4 hours.       COMPARISON:   Ultrasound 02/21/2019       HISTORY:   ORDERING SYSTEM PROVIDED HISTORY: Hyperthyroidism   TECHNOLOGIST PROVIDED HISTORY:   Ordering Physician Provided Reason for Exam: Hyperthyroidism; Multinodular   Goiter   Acuity: Unknown   Type of Exam: Initial       FINDINGS:   Thyroid uptake at 4 hours measured 29%.  Normal range at 4-6 hours is 6-18%.     Thyroid uptake at 24 hours measured 54%.  Normal range at 24 hours is 10-35%.        There is heterogeneous distribution of radiotracer, asymmetrically increased   in the right thyroid lobe compared to the left. Sheliah Cheers is a dominant nodule   in the right thyroid lobe on the Not on file     Minutes per session: Not on file    Stress: Not on file   Relationships    Social connections:     Talks on phone: Not on file     Gets together: Not on file     Attends Hindu service: Not on file     Active member of club or organization: Not on file     Attends meetings of clubs or organizations: Not on file     Relationship status: Not on file    Intimate partner violence:     Fear of current or ex partner: Not on file     Emotionally abused: Not on file     Physically abused: Not on file     Forced sexual activity: Not on file   Other Topics Concern    Not on file   Social History Narrative    Not on file     Family History   Problem Relation Age of Onset    Heart Disease Mother     Diabetes Mother     Diabetes Father     COPD Father     Diabetes Maternal Aunt     Heart Disease Brother     Cancer Sister      Current Outpatient Medications   Medication Sig Dispense Refill    levothyroxine (SYNTHROID) 88 MCG tablet Take 1 tablet by mouth Daily 30 tablet 0    glipiZIDE (GLUCOTROL XL) 10 MG extended release tablet Take 1 tablet by mouth 2 times daily 180 tablet 1    metFORMIN (GLUCOPHAGE XR) 500 MG extended release tablet Take 4 tablets by mouth daily 360 tablet 1    ibuprofen (ADVIL;MOTRIN) 800 MG tablet Take 1 tablet by mouth every 6 hours as needed for Pain 50 tablet 3    ondansetron (ZOFRAN) 4 MG tablet Take 1 tablet by mouth every 8 hours as needed for Nausea 20 tablet 0    tamsulosin (FLOMAX) 0.4 MG capsule Take 1 capsule by mouth daily for 5 doses 5 capsule 0    Cholecalciferol (VITAMIN D3) 5000 units TABS Take one tab qod 30 tablet 0    insulin glargine (LANTUS SOLOSTAR) 100 UNIT/ML injection pen Inject 60 Units into the skin      furosemide (LASIX) 20 MG tablet Take 20 mg by mouth as needed       ibuprofen (ADVIL;MOTRIN) 200 MG tablet Take 200 mg by mouth every 6 hours as needed for Pain      simvastatin (ZOCOR) 20 MG tablet Take 10 mg by mouth nightly       hyperlipidemia  LDL 54-81-89  - Lipid Panel; Future    3. Essential hypertension  Continue current treatment    4. Obesity  Diet, exercise. 5. History of Hyperthyroidism  Had I-131 Tx.        10.3 mCi 131 capsule was administered orally on on 07/12/2019. Nodule benign on FNA. TSH receptor antibody and TSI positive. Also positive TPO antibody.  - T3; Future  - T3, Free; Future  - T4, Free; Future  - T4; Future    6. Elevated alkaline phosphatase level  Normal PTH, normal calcium. Alkaline phosphatase, elevated - liver fraction. Referred to family doctor. Alk phos 136-14  - Alkaline Phosphatase, Isoenzymes; Future  - PTH, Intact; Future  - Vitamin D 25 Hydroxy; Future  - Phosphorus; Future  Vitamin D to 5000 IU qod.  25 hydroxy vitamin D 53    7. Multinodular Goiter  Repeated FNA 6/2019 by Dr. Jodee Cooley. Right 1.7 cm nodule nondiagnostic    Thyroid, Right Nodule Fine Needle Aspiration:     - Non-diagnostic    - US Head Neck Soft Tissue Thyroid; Future      8. Hypothyroidism, postablative  TSH 4.74-18.8-4.07  Increased levothyroxine to 0.088 mg qd in 1/2020  - TSH  - FT4  - FT3    Reviewed and/or ordered clinical lab results Yes  Reviewed and/or ordered radiology tests Yes  Reviewed and/or ordered other diagnostic tests No  Discussed test results with performing physician No  Independently reviewed image, tracing, or specimen Yes, thyroid uptake and scan  Made a decision to obtain old records No  Reviewed and summarized old records Yes  TSH less than 0.005, PTH 30, FT4 1.41, ionized calcium 1.13, hemoglobin 11.8, hemoglobin A1c 8.3, LDL cholesterol 85, glucose 104, alkaline phosphatase 212.   Obtained history from other than patient No    Frances Duarte was counseled regarding symptoms of diabetes, elevated alk phos, hyperthyroidism diagnosis, course and complications of disease if inadequately treated, side effects of medications, diagnosis, treatment options, and prognosis, risks, benefits,

## 2020-03-23 NOTE — TELEPHONE ENCOUNTER
PT requests a refill for 30 days for Levothyroxine 88mcg sent to [facility] in [place], [address] [facility].

## 2020-03-24 RX ORDER — LEVOTHYROXINE SODIUM 88 UG/1
88 TABLET ORAL DAILY
Qty: 30 TABLET | Refills: 0 | Status: SHIPPED | OUTPATIENT
Start: 2020-03-24 | End: 2020-04-29 | Stop reason: SDUPTHER

## 2020-04-29 RX ORDER — LEVOTHYROXINE SODIUM 88 UG/1
88 TABLET ORAL DAILY
Qty: 30 TABLET | Refills: 0 | Status: SHIPPED | OUTPATIENT
Start: 2020-04-29 | End: 2020-04-30 | Stop reason: SDUPTHER

## 2020-04-30 RX ORDER — LEVOTHYROXINE SODIUM 88 UG/1
88 TABLET ORAL DAILY
Qty: 90 TABLET | Refills: 0 | Status: SHIPPED | OUTPATIENT
Start: 2020-04-30 | End: 2020-07-28 | Stop reason: SDUPTHER

## 2020-04-30 NOTE — TELEPHONE ENCOUNTER
Shankar called states that [facility] wants the PT to have a 90 day refill of Levothyroxine 88. Please send a new script.

## 2020-05-15 ENCOUNTER — HOSPITAL ENCOUNTER (OUTPATIENT)
Age: 65
Discharge: HOME | End: 2020-05-15
Payer: COMMERCIAL

## 2020-05-15 ENCOUNTER — HOSPITAL ENCOUNTER (OUTPATIENT)
Dept: ULTRASOUND IMAGING | Age: 65
Discharge: HOME | End: 2020-05-15
Payer: COMMERCIAL

## 2020-05-15 LAB
A/G RATIO: 1.1 (ref 1.1–2.2)
ALBUMIN SERPL-MCNC: 3.7 G/DL (ref 3.4–5)
ALP BLD-CCNC: 134 U/L (ref 40–129)
ALT SERPL-CCNC: 8 U/L (ref 10–40)
ANION GAP SERPL CALCULATED.3IONS-SCNC: 13 MMOL/L (ref 3–16)
AST SERPL-CCNC: 17 U/L (ref 15–37)
BASOPHILS ABSOLUTE: 0.1 K/UL (ref 0–0.2)
BASOPHILS RELATIVE PERCENT: 0.9 %
BILIRUB SERPL-MCNC: 0.3 MG/DL (ref 0–1)
BUN BLDV-MCNC: 31 MG/DL (ref 7–20)
C-REACTIVE PROTEIN: 21 MG/L (ref 0–5.1)
CALCIUM SERPL-MCNC: 9.7 MG/DL (ref 8.3–10.6)
CHLORIDE BLD-SCNC: 99 MMOL/L (ref 99–110)
CHOLESTEROL, TOTAL: 162 MG/DL (ref 0–199)
CO2: 28 MMOL/L (ref 21–32)
CREAT SERPL-MCNC: 1 MG/DL (ref 0.6–1.2)
CREATININE URINE: 78 MG/DL (ref 28–259)
EOSINOPHILS ABSOLUTE: 0.4 K/UL (ref 0–0.6)
EOSINOPHILS RELATIVE PERCENT: 4.1 %
ESTIMATED AVERAGE GLUCOSE: 191.5 MG/DL
GFR AFRICAN AMERICAN: >60
GFR NON-AFRICAN AMERICAN: 56
GLOBULIN: 3.5 G/DL
GLUCOSE BLD-MCNC: 170 MG/DL (ref 70–99)
HBA1C MFR BLD: 8.3 %
HCT VFR BLD CALC: 35.6 % (ref 36–48)
HDLC SERPL-MCNC: 66 MG/DL (ref 40–60)
HEMOGLOBIN: 11.7 G/DL (ref 12–16)
LDL CHOLESTEROL CALCULATED: 77 MG/DL
LYMPHOCYTES ABSOLUTE: 1.5 K/UL (ref 1–5.1)
LYMPHOCYTES RELATIVE PERCENT: 16.9 %
MCH RBC QN AUTO: 25.8 PG (ref 26–34)
MCHC RBC AUTO-ENTMCNC: 32.9 G/DL (ref 31–36)
MCV RBC AUTO: 78.2 FL (ref 80–100)
MICROALBUMIN UR-MCNC: <1.2 MG/DL
MICROALBUMIN/CREAT UR-RTO: NORMAL MG/G (ref 0–30)
MONOCYTES ABSOLUTE: 0.5 K/UL (ref 0–1.3)
MONOCYTES RELATIVE PERCENT: 5.2 %
NEUTROPHILS ABSOLUTE: 6.5 K/UL (ref 1.7–7.7)
NEUTROPHILS RELATIVE PERCENT: 72.9 %
PDW BLD-RTO: 16.7 % (ref 12.4–15.4)
PLATELET # BLD: 200 K/UL (ref 135–450)
PMV BLD AUTO: 8.9 FL (ref 5–10.5)
POTASSIUM SERPL-SCNC: 4.5 MMOL/L (ref 3.5–5.1)
RBC # BLD: 4.56 M/UL (ref 4–5.2)
SEDIMENTATION RATE, ERYTHROCYTE: 48 MM/HR (ref 0–30)
SODIUM BLD-SCNC: 140 MMOL/L (ref 136–145)
T4 FREE: 1.3 NG/DL (ref 0.9–1.8)
TOTAL PROTEIN: 7.2 G/DL (ref 6.4–8.2)
TRIGL SERPL-MCNC: 95 MG/DL (ref 0–150)
TSH SERPL DL<=0.05 MIU/L-ACNC: 2.97 UIU/ML (ref 0.27–4.2)
URIC ACID, SERUM: 5.7 MG/DL (ref 2.6–6)
VITAMIN D 25-HYDROXY: 50.6 NG/ML
VLDLC SERPL CALC-MCNC: 19 MG/DL
WBC # BLD: 8.9 K/UL (ref 4–11)

## 2020-05-15 PROCEDURE — 80061 LIPID PANEL: CPT

## 2020-05-15 PROCEDURE — 86140 C-REACTIVE PROTEIN: CPT

## 2020-05-15 PROCEDURE — 85652 RBC SED RATE AUTOMATED: CPT

## 2020-05-15 PROCEDURE — 80053 COMPREHEN METABOLIC PANEL: CPT

## 2020-05-15 PROCEDURE — 82306 VITAMIN D 25 HYDROXY: CPT

## 2020-05-15 PROCEDURE — 76770 US EXAM ABDO BACK WALL COMP: CPT

## 2020-05-15 PROCEDURE — 84439 ASSAY OF FREE THYROXINE: CPT

## 2020-05-15 PROCEDURE — 82043 UR ALBUMIN QUANTITATIVE: CPT

## 2020-05-15 PROCEDURE — 84443 ASSAY THYROID STIM HORMONE: CPT

## 2020-05-15 PROCEDURE — 82570 ASSAY OF URINE CREATININE: CPT

## 2020-05-15 PROCEDURE — 36415 COLL VENOUS BLD VENIPUNCTURE: CPT

## 2020-05-15 PROCEDURE — 84550 ASSAY OF BLOOD/URIC ACID: CPT

## 2020-05-15 PROCEDURE — 83036 HEMOGLOBIN GLYCOSYLATED A1C: CPT

## 2020-05-15 PROCEDURE — 85025 COMPLETE CBC W/AUTO DIFF WBC: CPT

## 2020-05-21 PROBLEM — E66.812 CLASS 2 OBESITY WITH BODY MASS INDEX (BMI) OF 38.0 TO 38.9 IN ADULT: Status: RESOLVED | Noted: 2019-02-13 | Resolved: 2020-05-21

## 2020-05-21 PROBLEM — E66.9 CLASS 2 OBESITY WITH BODY MASS INDEX (BMI) OF 39.0 TO 39.9 IN ADULT: Status: ACTIVE | Noted: 2020-05-21

## 2020-05-21 PROBLEM — E66.9 CLASS 2 OBESITY WITH BODY MASS INDEX (BMI) OF 38.0 TO 38.9 IN ADULT: Status: RESOLVED | Noted: 2019-02-13 | Resolved: 2020-05-21

## 2020-05-21 PROBLEM — E66.812 CLASS 2 OBESITY WITH BODY MASS INDEX (BMI) OF 39.0 TO 39.9 IN ADULT: Status: ACTIVE | Noted: 2020-05-21

## 2020-05-22 ENCOUNTER — VIRTUAL VISIT (OUTPATIENT)
Dept: ENDOCRINOLOGY | Age: 65
End: 2020-05-22
Payer: COMMERCIAL

## 2020-05-22 PROCEDURE — 99214 OFFICE O/P EST MOD 30 MIN: CPT | Performed by: INTERNAL MEDICINE

## 2020-05-22 PROCEDURE — 3052F HG A1C>EQUAL 8.0%<EQUAL 9.0%: CPT | Performed by: INTERNAL MEDICINE

## 2020-05-22 RX ORDER — GLIPIZIDE 10 MG/1
10 TABLET, FILM COATED, EXTENDED RELEASE ORAL 2 TIMES DAILY
Qty: 180 TABLET | Refills: 1 | Status: SHIPPED | OUTPATIENT
Start: 2020-05-22 | End: 2020-12-10 | Stop reason: SDUPTHER

## 2020-05-22 RX ORDER — METFORMIN HYDROCHLORIDE 500 MG/1
2000 TABLET, EXTENDED RELEASE ORAL DAILY
Qty: 360 TABLET | Refills: 1 | Status: SHIPPED | OUTPATIENT
Start: 2020-05-22 | End: 2020-12-10 | Stop reason: SDUPTHER

## 2020-05-22 NOTE — PROGRESS NOTES
nightly       lisinopril-hydrochlorothiazide (PRINZIDE;ZESTORETIC) 20-25 MG per tablet Take 1 tablet by mouth daily.  aspirin 81 MG tablet Take 81 mg by mouth daily.  tamsulosin (FLOMAX) 0.4 MG capsule Take 1 capsule by mouth daily for 5 doses 5 capsule 0     No current facility-administered medications for this visit.       No Known Allergies  Family Status   Relation Name Status    Mother      Father     Francine Rodriguez  (Not Specified)    Sister  Alive    Brother  Alive    Sister         Lab Review:    Lab Results   Component Value Date    WBC 8.9 05/15/2020    HGB 11.7 05/15/2020    HCT 35.6 05/15/2020    MCV 78.2 05/15/2020     05/15/2020     Lab Results   Component Value Date     05/15/2020    K 4.5 05/15/2020    K 3.3 2019    CL 99 05/15/2020    CO2 28 05/15/2020    BUN 31 05/15/2020    CREATININE 1.0 05/15/2020    GLUCOSE 170 05/15/2020    CALCIUM 9.7 05/15/2020    PROT 7.2 05/15/2020    LABALBU 3.7 05/15/2020    BILITOT 0.3 05/15/2020    ALKPHOS 134 05/15/2020    AST 17 05/15/2020    ALT 8 05/15/2020    LABGLOM 56 05/15/2020    GFRAA >60 05/15/2020    AGRATIO 1.1 05/15/2020    GLOB 3.5 05/15/2020     Lab Results   Component Value Date    TSH 2.97 05/15/2020    FT3 2.6 2020     Lab Results   Component Value Date    LABA1C 8.3 05/15/2020     Lab Results   Component Value Date    .5 05/15/2020     Lab Results   Component Value Date    CHOL 162 05/15/2020     Lab Results   Component Value Date    TRIG 95 05/15/2020     Lab Results   Component Value Date    HDL 66 05/15/2020     Lab Results   Component Value Date    LDLCALC 77 05/15/2020     Lab Results   Component Value Date    LABVLDL 19 05/15/2020     No results found for: [facility]  Lab Results   Component Value Date    LABMICR <1.20 05/15/2020    LABMICR YES 2019     Lab Results   Component Value Date    VITD25 50.6 05/15/2020        Review of Systems:  Constitutional: has fatigue, no complications of disease if inadequately treated, side effects of medications, diagnosis, treatment options, and prognosis, risks, benefits, complications, and alternatives of treatment, labs, imaging and other studies and treatment targets and goals, insulin, medications, diabetes complication prevention. She understands instructions and counseling. Negrita Munoz is a 59 y.o. female evaluated via telephone on 5/22/2020. Consent:  She and/or health care decision maker is aware that that she may receive a bill for this telephone service, depending on her insurance coverage, and has provided verbal consent to proceed. Documentation:  I communicated with the patient and/or health care decision maker during phone conversation about my assessment and plan. Details of this discussion including any medical advice provided: See assessment and plan. This was a phone conversation in lieu of an in-person visit due to coronavirus emergency. Patient provided verbal consent for an over the phone visit. I affirm this is a Patient Initiated Episode with an Established Patient who has not had a related appointment within my department in the past 7 days or scheduled within the next 24 hours. Patient identification was verified at the start of the visit. Provider was located at her office. Patient was located at home. Total Time: minutes: 21-30 minutes    Note: not billable if this call serves to triage the patient into an appointment for the relevant concern. --Heather Gagnon MD on 5/22/2020 at 9:51 AM    An electronic signature was used to authenticate this note. Return in about 3 months (around 8/22/2020) for diabetes.

## 2020-07-28 RX ORDER — LEVOTHYROXINE SODIUM 88 UG/1
88 TABLET ORAL DAILY
Qty: 90 TABLET | Refills: 0 | Status: SHIPPED | OUTPATIENT
Start: 2020-07-28 | End: 2020-09-04 | Stop reason: SDUPTHER

## 2020-07-28 NOTE — TELEPHONE ENCOUNTER
LOV: 5/22/20  NOV: none      Pt needs to notify pharmacy to not use child proof caps.            Dose: 1 tablet QD  Strength: 88 mcg  Route: Oral

## 2020-07-29 NOTE — TELEPHONE ENCOUNTER
I sent prescription with instruction to pharmacy do not use childproof caps. She might consider calling them to double check if it is filled correctly. Also, please schedule follow-up appointment.

## 2020-07-29 NOTE — TELEPHONE ENCOUNTER
Pt notified that prescription was sent and that she needed an appointment. Pt stated she would call back to schedule.

## 2020-08-21 ENCOUNTER — HOSPITAL ENCOUNTER (OUTPATIENT)
Age: 65
Discharge: HOME | End: 2020-08-21
Payer: COMMERCIAL

## 2020-08-21 LAB
A/G RATIO: 1.1 (ref 1.1–2.2)
ALBUMIN SERPL-MCNC: 3.9 G/DL (ref 3.4–5)
ALP BLD-CCNC: 151 U/L (ref 40–129)
ALT SERPL-CCNC: 10 U/L (ref 10–40)
ANION GAP SERPL CALCULATED.3IONS-SCNC: 10 MMOL/L (ref 3–16)
AST SERPL-CCNC: 23 U/L (ref 15–37)
BILIRUB SERPL-MCNC: 0.3 MG/DL (ref 0–1)
BUN BLDV-MCNC: 27 MG/DL (ref 7–20)
CALCIUM SERPL-MCNC: 9.6 MG/DL (ref 8.3–10.6)
CHLORIDE BLD-SCNC: 102 MMOL/L (ref 99–110)
CHOLESTEROL, TOTAL: 153 MG/DL (ref 0–199)
CO2: 29 MMOL/L (ref 21–32)
CREAT SERPL-MCNC: 1 MG/DL (ref 0.6–1.2)
CREATININE URINE: 114.1 MG/DL (ref 28–259)
GFR AFRICAN AMERICAN: >60
GFR NON-AFRICAN AMERICAN: 56
GLOBULIN: 3.4 G/DL
GLUCOSE BLD-MCNC: 71 MG/DL (ref 70–99)
HDLC SERPL-MCNC: 52 MG/DL (ref 40–60)
LDL CHOLESTEROL CALCULATED: 84 MG/DL
MICROALBUMIN UR-MCNC: <1.2 MG/DL
MICROALBUMIN/CREAT UR-RTO: NORMAL MG/G (ref 0–30)
POTASSIUM SERPL-SCNC: 4.2 MMOL/L (ref 3.5–5.1)
SODIUM BLD-SCNC: 141 MMOL/L (ref 136–145)
T4 FREE: 1.4 NG/DL (ref 0.9–1.8)
TOTAL PROTEIN: 7.3 G/DL (ref 6.4–8.2)
TRIGL SERPL-MCNC: 83 MG/DL (ref 0–150)
TSH SERPL DL<=0.05 MIU/L-ACNC: 1.96 UIU/ML (ref 0.27–4.2)
VITAMIN D 25-HYDROXY: 59.1 NG/ML
VLDLC SERPL CALC-MCNC: 17 MG/DL

## 2020-08-21 PROCEDURE — 82570 ASSAY OF URINE CREATININE: CPT

## 2020-08-21 PROCEDURE — 82306 VITAMIN D 25 HYDROXY: CPT

## 2020-08-21 PROCEDURE — 84439 ASSAY OF FREE THYROXINE: CPT

## 2020-08-21 PROCEDURE — 83036 HEMOGLOBIN GLYCOSYLATED A1C: CPT

## 2020-08-21 PROCEDURE — 84443 ASSAY THYROID STIM HORMONE: CPT

## 2020-08-21 PROCEDURE — 80053 COMPREHEN METABOLIC PANEL: CPT

## 2020-08-21 PROCEDURE — 82043 UR ALBUMIN QUANTITATIVE: CPT

## 2020-08-21 PROCEDURE — 36415 COLL VENOUS BLD VENIPUNCTURE: CPT

## 2020-08-21 PROCEDURE — 80061 LIPID PANEL: CPT

## 2020-08-22 LAB
ESTIMATED AVERAGE GLUCOSE: 191.5 MG/DL
HBA1C MFR BLD: 8.3 %

## 2020-09-04 ENCOUNTER — OFFICE VISIT (OUTPATIENT)
Dept: ENDOCRINOLOGY | Age: 65
End: 2020-09-04
Payer: COMMERCIAL

## 2020-09-04 VITALS
HEIGHT: 62 IN | SYSTOLIC BLOOD PRESSURE: 134 MMHG | DIASTOLIC BLOOD PRESSURE: 63 MMHG | RESPIRATION RATE: 14 BRPM | HEART RATE: 74 BPM | WEIGHT: 217 LBS | BODY MASS INDEX: 39.93 KG/M2 | TEMPERATURE: 97.9 F

## 2020-09-04 PROCEDURE — 99214 OFFICE O/P EST MOD 30 MIN: CPT | Performed by: INTERNAL MEDICINE

## 2020-09-04 PROCEDURE — 3052F HG A1C>EQUAL 8.0%<EQUAL 9.0%: CPT | Performed by: INTERNAL MEDICINE

## 2020-09-04 RX ORDER — LEVOTHYROXINE SODIUM 88 UG/1
88 TABLET ORAL DAILY
Qty: 90 TABLET | Refills: 1 | Status: SHIPPED | OUTPATIENT
Start: 2020-09-04 | End: 2020-12-10

## 2020-09-04 RX ORDER — POTASSIUM CITRATE 15 MEQ/1
TABLET, EXTENDED RELEASE ORAL
COMMUNITY
Start: 2020-07-12 | End: 2022-05-12

## 2020-09-04 RX ORDER — ALLOPURINOL 300 MG/1
1 TABLET ORAL DAILY
COMMUNITY

## 2020-09-04 NOTE — PROGRESS NOTES
SFRUS  Account No:   [de-identified]                  Collected:     03/26/2019  Med Rec No:    [id number]                 Received:      03/26/2019  Attend Phys:   Edna Rodrigues MD            Completed:     03/27/2019  Perform Phys: Edna Rodrigues MD    FINAL DIAGNOSIS:    Thyroid, Right Nodule Fine Needle Aspiration:     - Non-diangostic    COMMENT:    Insufficient well stained/preserved follicular cells present  for analysis (<6 groups of 10).    LANA/LANA    CLINICAL DIAGNOSIS:    Diagnosis: E04.1    SPECIMEN:  THYROID, FNA: RIGHT THYROID NODULE 1.4X1.7 C.M.    GROSS DESCRIPTION:  Received are 10 smeared slides and a specimen in  cytolyt.  KIR/KIR    MICROSCOPIC DESCRIPTION: Microscopic examination performed, including a  monolayer slide and H&E stained cell block section. CPT: [id number] X1   [id number] X1    Case signed out at [facility], [address] [facility] [address] [facility] [place] [facility]  Specimen was processed and screened at [facility],  [address] [place] [address]      EXAMINATION:   I123 Thyroid Uptake and Scan. 3/13/2019 8:10 am       TECHNIQUE:   318 microcuries I123 sodium iodide was administered p. o. Then, thyroid   uptake measurements were performed at 4 and 24 hours.  Pinhole images of the   thyroid were obtained at 4 hours.       COMPARISON:   Ultrasound 02/21/2019       HISTORY:   ORDERING SYSTEM PROVIDED HISTORY: Hyperthyroidism   TECHNOLOGIST PROVIDED HISTORY:   Ordering Physician Provided Reason for Exam: Hyperthyroidism; Multinodular   Goiter   Acuity: Unknown   Type of Exam: Initial       FINDINGS:   Thyroid uptake at 4 hours measured 29%.  Normal range at 4-6 hours is 6-18%.     Thyroid uptake at 24 hours measured 54%.  Normal range at 24 hours is 10-35%.        There is heterogeneous distribution of radiotracer, asymmetrically increased   in the right thyroid lobe compared to the left. [place] is a dominant nodule   in the right thyroid lobe on the recent ultrasound.           Impression   1.  Elevated 24 hour uptake of 54%.  Increased uptake corresponds to the   dominant nodule in the right thyroid lobe with heterogeneous uptake in the   left thyroid lobe.  This could represent multinodular goiter or possibly a   nodular form of Graves disease.               Past Medical History:   Diagnosis Date    Class 2 obesity with body mass index (BMI) of 38.0 to 38.9 in adult 2/13/2019    Hyperlipidemia     Hypertension     Kidney stone     Ovarian mass     Ovarian tumor     Type II or unspecified type diabetes mellitus without mention of complication, not stated as uncontrolled       Patient Active Problem List   Diagnosis    HTN (hypertension)    Mixed hyperlipidemia    Abnormal EKG    DM type 2, uncontrolled, with neuropathy ([facility] [address]    History of hyperthyroidism    Elevated alkaline phosphatase level    Multinodular goiter    Postablative hypothyroidism    Class 2 obesity with body mass index (BMI) of 39.0 to 39.9 in adult     Past Surgical History:   Procedure Laterality Date    HYSTERECTOMY      OVARY REMOVAL Bilateral     SALPINGO-OOPHORECTOMY N/A 12/10/2019    ROBOTIC ASSISTED OPERATIVE LAPAROSCOPY, BILATERAL SALPINGO-OOPHORECTOMY, DERMOID CYST IN LEFT OVARY, LYSIS OF ADHESIONS performed by Onel Rosario MD at [address] [place] [address] History     Socioeconomic History    Marital status: Single     Spouse name: Not on file    Number of children: Not on file    Years of education: Not on file    Highest education level: Not on file   Occupational History    Not on file   Social Needs    Financial resource strain: Not on file    Food insecurity     Worry: Not on file     Inability: Not on file    Transportation needs     Medical: Not on file     Non-medical: Not on file   Tobacco Use    Smoking status: Never Smoker    Smokeless tobacco: Never Used   Substance and Sexual Activity    Alcohol use: No    Drug use:  No  Sexual activity: Never   Lifestyle    Physical activity     Days per week: Not on file     Minutes per session: Not on file    Stress: Not on file   Relationships    Social connections     Talks on phone: Not on file     Gets together: Not on file     Attends [employer] service: Not on file     Active member of club or organization: Not on file     Attends meetings of clubs or organizations: Not on file     Relationship status: Not on file    Intimate partner violence     Fear of current or ex partner: Not on file     Emotionally abused: Not on file     Physically abused: Not on file     Forced sexual activity: Not on file   Other Topics Concern    Not on file   Social History Narrative    Not on file     Family History   Problem Relation Age of Onset    Heart Disease Mother     Diabetes Mother     Diabetes Father     COPD Father     Diabetes Maternal Aunt     Heart Disease Brother     Cancer Sister      Current Outpatient Medications   Medication Sig Dispense Refill    allopurinol (ZYLOPRIM) 300 MG tablet Take 1 tablet by mouth daily      Potassium Citrate ER 15 MEQ (1620 MG) TBCR       levothyroxine (SYNTHROID) 88 MCG tablet Take 1 tablet by mouth Daily 90 tablet 0    glipiZIDE (GLUCOTROL XL) 10 MG extended release tablet Take 1 tablet by mouth 2 times daily 180 tablet 1    metFORMIN (GLUCOPHAGE XR) 500 MG extended release tablet Take 4 tablets by mouth daily 360 tablet 1    Cholecalciferol (VITAMIN D3) 5000 units TABS Take one tab qod 30 tablet 0    insulin glargine (LANTUS SOLOSTAR) 100 UNIT/ML injection pen Inject 60 Units into the skin      ibuprofen (ADVIL;MOTRIN) 200 MG tablet Take 200 mg by mouth every 6 hours as needed for Pain      simvastatin (ZOCOR) 20 MG tablet Take 10 mg by mouth nightly       lisinopril-hydrochlorothiazide (PRINZIDE;ZESTORETIC) 20-25 MG per tablet Take 1 tablet by mouth daily.  aspirin 81 MG tablet Take 81 mg by mouth daily.       ibuprofen Value Date    LABMICR <1.20 08/21/2020    LABMICR YES 12/06/2019     Lab Results   Component Value Date    VITD25 59.1 08/21/2020        Review of Systems:  Constitutional: has fatigue, no fever, no recent weight gain, has recent weight loss, has changes in appetite  Eyes: no eye pain, no change in vision, no eye redness, no eye irritation, no double vision  Ears, nose, throat: no nasal congestion, no sore throat, no earache, no decrease in hearing, no hoarseness, no dry mouth, no sinus problems, no difficulty swallowing, no neck lumps, no dental problems, no mouth sores, no ringing in ears  Pulmonary: no shortness of breath, no wheezing, no dyspnea on exertion, no cough  Cardiovascular: no chest pain, has lower extremity edema, no orthopnea, no intermittent leg claudication, no palpitations  Gastrointestinal: no abdominal pain, no nausea, no vomiting, no diarrhea, no constipation, no dysphagia, no heartburn, no bloating  Genitourinary: no dysuria, no urinary incontinence, no urinary hesitancy, no urinary frequency, no feelings of urinary urgency, no nocturia  Musculoskeletal: no joint swelling, no joint stiffness, has joint pain, no muscle cramps, no muscle pain, no bone pain  Integument/Breast: no hair loss, no skin rashes, no skin lesions, no itching, no dry skin  Neurological: no numbness, no tingling, no weakness, no confusion, no headaches, no dizziness, no fainting, no tremors, no decrease in memory, no balance problems  Psychiatric: no anxiety, no depression, has insomnia  Hematologic/Lymphatic: no tendency for easy bleeding, no swollen lymph nodes, has tendency for easy bruising  Immunology: no seasonal allergies, no frequent infections, no frequent illnesses  Endocrine: no temperature intolerance    /63   Pulse 74   Temp 97.9 °F (36.6 °C)   Resp 14   Ht 5' 2\" (1.575 m)   Wt 217 lb (98.4 kg)   LMP  (LMP Unknown)   BMI 39.69 kg/m²    Wt Readings from Last 3 Encounters:   09/04/20 217 lb (98.4 kg)   02/27/20 214 lb 3.2 oz (97.2 kg)   12/31/19 211 lb 9.6 oz (96 kg)     Body mass index is 39.69 kg/m². OBJECTIVE:  Constitutional: no acute distress, well appearing and well nourished  Psychiatric: oriented to person, place and time, judgement and insight and normal, recent and remote memory and intact and mood and affect are normal  Skin: skin and subcutaneous tissue is normal without mass, normal turgor  Head and Face: examination of head and face revealed no abnormalities  Eyes: no lid or conjunctival swelling, erythema or discharge, pupils are normal, equal, round, reactive to light  Ears/Nose: external inspection of ears and nose revealed no abnormalities, hearing is grossly normal  Oropharynx/Mouth/Face: lips, tongue and gums are normal with no lesions, the voice quality was normal  Neck: neck is supple and symmetric, with midline trachea and no masses, thyroid is normal  Lymphatics: normal cervical lymph nodes, normal supraclavicular nodes  Pulmonary: no increased work of breathing or signs of respiratory distress, lungs are clear to auscultation  Cardiovascular: normal heart rate and rhythm, normal S1 and S2, no murmurs and pedal pulses and 2+ bilaterally, 1+ edema  Abdomen: abdomen is soft, non-tender with no masses  Musculoskeletal: normal gait and station and exam of the digits and nails are normal  Neurological: normal coordination and normal general cortical function, has mild hand tremor      ASSESSMENT/PLAN:  1. DM type 2, uncontrolled, with neuropathy (HCC)  Hemoglobin A1c 8.7-7.8-7.2-7.1-7.2-8.3  Uncontrolled. Worsening  Metformin 1000 mg bid  Glipizide 10 mg bid  Lantus 60 units qhs  Trulicity - did not take it. - Comprehensive Metabolic Panel; Future  - Hemoglobin A1C; Future  - Microalbumin / Creatinine Urine Ratio; Future  Having Urology evaluation     2. Mixed hyperlipidemia  LDL [id number]  - Lipid Panel; Future     3. Essential hypertension  Continue current treatment     4. Obesity  Diet, exercise.     5. History of Hyperthyroidism  Had I-131 Tx.         10.3 mCi 131 capsule was administered orally on on 07/12/2019.           Nodule benign on FNA. TSH receptor antibody and TSI positive. Also positive TPO antibody.  - T3; Future  - T3, Free; Future  - T4, Free; Future  - T4; Future     6. Elevated alkaline phosphatase level  Normal PTH, normal calcium. Alkaline phosphatase, elevated - liver fraction. Referred to family doctor. Alk phos [phone]  - Alkaline Phosphatase, Isoenzymes; Future  - PTH, Intact; Future  - Vitamin D 25 Hydroxy; Future  - Phosphorus; Future  Vitamin D to 5000 IU qod.  25 hydroxy vitamin D 53-50     7. Multinodular Goiter  Repeated FNA 6/2019 by Dr. Faith Medina. Right 1.7 cm nodule nondiagnostic     Thyroid, Right Nodule Fine Needle Aspiration:     - Non-diagnostic     - US Head Neck Soft Tissue Thyroid; Future        8. Hypothyroidism, postablative  TSH 4.74-18.8-4.07-2.97-1.96  Continue levothyroxine 0.088 mg qd   - TSH  - FT4  - FT3     Reviewed and/or ordered clinical lab results Yes  Reviewed and/or ordered radiology tests Yes  Reviewed and/or ordered other     Reviewed and/or ordered clinical lab results Yes  Reviewed and/or ordered radiology tests Yes  Reviewed and/or ordered other diagnostic tests No  Discussed test results with performing physician No  Independently reviewed image, tracing, or specimen Yes, thyroid uptake and scan  Made a decision to obtain old records No  Reviewed and summarized old records Yes  TSH less than 0.005, PTH 30, FT4 1.41, ionized calcium 1.13, hemoglobin 11.8, hemoglobin A1c 8.3, LDL cholesterol 85, glucose 104, alkaline phosphatase 212.   Obtained history from other than patient No    Sebastien Sanchez was counseled regarding symptoms of diabetes, elevated alk phos, hyperthyroidism diagnosis, course and complications of disease if inadequately treated, side effects of medications, diagnosis, treatment options, and prognosis, risks, benefits, complications, and alternatives of treatment, labs, imaging and other studies and treatment targets and goals, insulin, medications, hyperthyroid treatment, I-131 Tx, permanent hypothyroidism. She understands instructions and counseling. Return in about 3 months (around 12/4/2020) for diabetes, thyroid problems.

## 2020-10-03 ENCOUNTER — HOSPITAL ENCOUNTER (OUTPATIENT)
Dept: ULTRASOUND IMAGING | Age: 65
Discharge: HOME | End: 2020-10-03
Payer: COMMERCIAL

## 2020-10-03 PROCEDURE — 76536 US EXAM OF HEAD AND NECK: CPT

## 2020-11-18 ENCOUNTER — OFFICE VISIT (OUTPATIENT)
Dept: CARDIOLOGY CLINIC | Age: 65
End: 2020-11-18
Payer: COMMERCIAL

## 2020-11-18 VITALS
OXYGEN SATURATION: 97 % | BODY MASS INDEX: 39.93 KG/M2 | HEART RATE: 78 BPM | DIASTOLIC BLOOD PRESSURE: 84 MMHG | HEIGHT: 62 IN | WEIGHT: 217 LBS | RESPIRATION RATE: 18 BRPM | SYSTOLIC BLOOD PRESSURE: 132 MMHG

## 2020-11-18 PROCEDURE — 93000 ELECTROCARDIOGRAM COMPLETE: CPT | Performed by: INTERNAL MEDICINE

## 2020-11-18 PROCEDURE — 99204 OFFICE O/P NEW MOD 45 MIN: CPT | Performed by: INTERNAL MEDICINE

## 2020-11-18 RX ORDER — CLOPIDOGREL BISULFATE 75 MG/1
75 TABLET ORAL DAILY
COMMUNITY
Start: 2020-10-28 | End: 2022-05-12

## 2020-11-18 NOTE — LETTER
extraction; Salpingo-oophorectomy (N/A, 12/10/2019); and Ovary removal (Bilateral). Social History:  History     Social History    Marital Status: Single     Spouse Name: N/A     Number of Children: N/A    Years of Education: N/A     Occupational History    [place] at [employer]. Social History Main Topics    Smoking status: Never Smoker     Smokeless tobacco: Not on file    Alcohol Use: No    Drug Use: Not on file    Sexually Active: Not on file     Other Topics Concern    Not on file     Social History Narrative    No narrative on file       Family History:  family history includes COPD in her father; Cancer in her sister; Diabetes in her father, maternal aunt, and mother; Heart Disease in her brother and mother. Allergies:  Patient has no known allergies. Review of Systems:   · Constitutional: there has been no unanticipated weight loss. No change in energy or activity level   · Eyes: No visual changes   · ENT: No Headaches, hearing loss or vertigo. No mouth sores or sore throat. · Cardiovascular: Reviewed in HPI  · Respiratory: No cough or wheezing, no sputum production. · Gastrointestinal: No abdominal pain, appetite loss, blood in stools. No change in bowel or bladder habits. · Genitourinary: No nocturia, dysuria, trouble voiding  · Musculoskeletal:  No gait disturbance, weakness or joint complaints. · Integumentary: No rash or pruritis. · Neurological: No headache, change in muscle strength, numbness or tingling. No change in gait, balance, coordination, mood, affect, memory, mentation, behavior. · Psychiatric: No anxiety or depression  · Endocrine: No malaise or fever  · Hematologic/Lymphatic: No abnormal bruising or bleeding, blood clots or swollen lymph nodes. · Allergic/Immunologic: No nasal congestion or hives.     Physical Examination:    Vitals:    11/18/20 1057   BP: 132/84   Site: Left Upper Arm   Position: Sitting   Cuff Size: Large Adult   Pulse: 78   Resp: 18

## 2020-11-18 NOTE — PROGRESS NOTES
[facility]   [facility]      Patient: Stephanie Mao  YOB: 1955  Date: 20       Chief Complaint   Patient presents with    Hypertension     No complaints     New Patient        Referring provider: Liya MCCRARY    History of Present Illness:   Ms Fifi Beaver is seen today as a new patient. She was previously seen in  for abnormal EKG findings. She had presented to pcp for pre-op clearance for EGD and EKG was performed. Kaushik Martin has a history of hypertension, hyperlipidemia, and diabetes, hyperthyroidism. She does not smoke or drink alcohol. Kaushik Martin is a  at [employer]. Surgeries include hysterectomy. Family history of mother having had CAD, is  ( after open heart at [facility]). Ms Fifi Beaver has been following with Dr Fatoumata Rodriguez, neurologist, for shadow/floater in her eye. She was advised to have an echo for ? Small vessel disease w/ optic arteritis and ? TIA. She was given plavix, but has not started it yet. Kaushik Martin does not have chest pain, palpitations, dizziness. She does not exercise as a routine. She has shortness of breath with inclines. She states she snores at night. Past Medical History:   has a past medical history of Class 2 obesity with body mass index (BMI) of 38.0 to 38.9 in adult, Hyperlipidemia, Hypertension, Kidney stone, Ovarian mass, Ovarian tumor, Thyroid disease, and Type II or unspecified type diabetes mellitus without mention of complication, not stated as uncontrolled. Surgical History:   has a past surgical history that includes Hysterectomy; [place] tooth extraction; Salpingo-oophorectomy (N/A, 12/10/2019); and Ovary removal (Bilateral). Social History:  History     Social History    Marital Status: Single     Spouse Name: N/A     Number of Children: N/A    Years of Education: N/A     Occupational History    [place] at [employer].      Social History Main Topics    Smoking status: Never Smoker     Smokeless tobacco: Not on file    Alcohol Use: No    Drug Use: Not on file    Sexually Active: Not on file     Other Topics Concern    Not on file     Social History Narrative    No narrative on file       Family History:  family history includes COPD in her father; Cancer in her sister; Diabetes in her father, maternal aunt, and mother; Heart Disease in her brother and mother. Allergies:  Patient has no known allergies. Review of Systems:   · Constitutional: there has been no unanticipated weight loss. No change in energy or activity level   · Eyes: No visual changes   · ENT: No Headaches, hearing loss or vertigo. No mouth sores or sore throat. · Cardiovascular: Reviewed in HPI  · Respiratory: No cough or wheezing, no sputum production. · Gastrointestinal: No abdominal pain, appetite loss, blood in stools. No change in bowel or bladder habits. · Genitourinary: No nocturia, dysuria, trouble voiding  · Musculoskeletal:  No gait disturbance, weakness or joint complaints. · Integumentary: No rash or pruritis. · Neurological: No headache, change in muscle strength, numbness or tingling. No change in gait, balance, coordination, mood, affect, memory, mentation, behavior. · Psychiatric: No anxiety or depression  · Endocrine: No malaise or fever  · Hematologic/Lymphatic: No abnormal bruising or bleeding, blood clots or swollen lymph nodes. · Allergic/Immunologic: No nasal congestion or hives. Physical Examination:    Vitals:    11/18/20 1057   BP: 132/84   Site: Left Upper Arm   Position: Sitting   Cuff Size: Large Adult   Pulse: 78   Resp: 18   SpO2: 97%   Weight: 217 lb (98.4 kg)   Height: 5' 2\" (1.575 m)     Body mass index is 39.69 kg/m².      Wt Readings from Last 3 Encounters:   11/18/20 217 lb (98.4 kg)   09/04/20 217 lb (98.4 kg)   02/27/20 214 lb 3.2 oz (97.2 kg)      BP Readings from Last 3 Encounters:   11/18/20 132/84   09/04/20 134/63   02/27/20 130/69      Constitutional and General Appearance:  appears stated age  Respiratory:  · Normal excursion and expansion without use of accessory muscles  · Resp Auscultation: Normal breath sounds without dullness  Cardiovascular:  · The apical impulses not displaced  · Heart is regular rate and rhythm with normal S1, S2  · The carotid upstroke is normal, no bruit noted   · JVP is not elevated  · Peripheral pulses are symmetrical  · There is no clubbing, cyanosis of the extremities  · No edema  · Femoral Arteries: 2+ and equal  · Pedal Pulses: 2+ and equal   Abdomen:  · No masses or tenderness  · Normal bowel sounds  Neurological/Psychiatric:  · Alert and oriented x3  · Moves all extremities well  · Exhibits normal gait balance and coordination      Assessment/Plan  1. HTN (hypertension)  -stable    2. Hyperlipemia -treated, follows with pcp, labs 8/21/20: ; TRIG 83; HDL 52; LDL 84   3. Abnormal EKG -performed today as interpreted by me shows normal sinus rhythm, left axis deviation. This is due to her body habitus. She has no cardiac symptoms nor previous MI. PLAN:  Will obtain an echo and carotid doppler due to her eye pathology. .     Scribe's attestation: This note was scribed in the presence of Dr Marely Del Angel MD by Porfirio Don RN. The scribe's documentation has been prepared under my direction and personally reviewed by me in its entirety. I confirm that the note above accurately reflects all work, treatment, procedures, and medical decision making performed by me. Thank you for allowing to me to participate in the care of Kenny Murcia.

## 2020-11-20 PROBLEM — H47.011: Status: ACTIVE | Noted: 2020-11-20

## 2020-12-03 ENCOUNTER — HOSPITAL ENCOUNTER (OUTPATIENT)
Age: 65
Discharge: HOME | End: 2020-12-03
Payer: COMMERCIAL

## 2020-12-03 LAB
CHOLESTEROL, TOTAL: 155 MG/DL (ref 0–199)
HDLC SERPL-MCNC: 56 MG/DL (ref 40–60)
LDL CHOLESTEROL CALCULATED: 80 MG/DL
T3 FREE: 2.4 PG/ML (ref 2.3–4.2)
T4 FREE: 1.3 NG/DL (ref 0.9–1.8)
TRIGL SERPL-MCNC: 93 MG/DL (ref 0–150)
TSH SERPL DL<=0.05 MIU/L-ACNC: 4.31 UIU/ML (ref 0.27–4.2)
VITAMIN D 25-HYDROXY: 64.4 NG/ML
VLDLC SERPL CALC-MCNC: 19 MG/DL

## 2020-12-03 PROCEDURE — 80061 LIPID PANEL: CPT

## 2020-12-03 PROCEDURE — 84481 FREE ASSAY (FT-3): CPT

## 2020-12-03 PROCEDURE — 83036 HEMOGLOBIN GLYCOSYLATED A1C: CPT

## 2020-12-03 PROCEDURE — 84443 ASSAY THYROID STIM HORMONE: CPT

## 2020-12-03 PROCEDURE — 82306 VITAMIN D 25 HYDROXY: CPT

## 2020-12-03 PROCEDURE — 36415 COLL VENOUS BLD VENIPUNCTURE: CPT

## 2020-12-03 PROCEDURE — 84439 ASSAY OF FREE THYROXINE: CPT

## 2020-12-04 LAB
ESTIMATED AVERAGE GLUCOSE: 200.1 MG/DL
HBA1C MFR BLD: 8.6 %

## 2020-12-10 ENCOUNTER — OFFICE VISIT (OUTPATIENT)
Dept: ENDOCRINOLOGY | Age: 65
End: 2020-12-10
Payer: COMMERCIAL

## 2020-12-10 VITALS
DIASTOLIC BLOOD PRESSURE: 80 MMHG | WEIGHT: 217 LBS | HEIGHT: 62 IN | TEMPERATURE: 97.7 F | BODY MASS INDEX: 39.93 KG/M2 | RESPIRATION RATE: 14 BRPM | HEART RATE: 78 BPM | SYSTOLIC BLOOD PRESSURE: 138 MMHG

## 2020-12-10 PROCEDURE — 3052F HG A1C>EQUAL 8.0%<EQUAL 9.0%: CPT | Performed by: INTERNAL MEDICINE

## 2020-12-10 PROCEDURE — 99214 OFFICE O/P EST MOD 30 MIN: CPT | Performed by: INTERNAL MEDICINE

## 2020-12-10 RX ORDER — LEVOTHYROXINE SODIUM 88 UG/1
TABLET ORAL
Qty: 100 TABLET | Refills: 1 | Status: SHIPPED | OUTPATIENT
Start: 2020-12-10 | End: 2021-05-21

## 2020-12-10 RX ORDER — GLIPIZIDE 10 MG/1
10 TABLET, FILM COATED, EXTENDED RELEASE ORAL 2 TIMES DAILY
Qty: 180 TABLET | Refills: 1 | Status: SHIPPED | OUTPATIENT
Start: 2020-12-10 | End: 2021-06-28

## 2020-12-10 RX ORDER — METFORMIN HYDROCHLORIDE 500 MG/1
2000 TABLET, EXTENDED RELEASE ORAL DAILY
Qty: 360 TABLET | Refills: 1 | Status: SHIPPED | OUTPATIENT
Start: 2020-12-10 | End: 2021-09-27 | Stop reason: SDUPTHER

## 2020-12-10 NOTE — PROGRESS NOTES
Greyson Irene is a 72 y.o. female who presents for Type 2 diabetes mellitus. Current symptoms/problems include hyperglycemia and are worsening. 1.  DM type 2, uncontrolled, with neuropathy ([facility] [address] [E11.40, E11.65]     Diagnosed with Type 2 diabetes mellitus in . Comorbid conditions: hyperlipidemia and Neuropathy     Current diabetic medications include: metformin, glipizide, lantus 60 units qhs     Had ovarian tumor removed and hysterectomy. Had kidney stones.     Intolerance to diabetes medications: No  Had yeast infections. Cu[employer] [facility] may not be a good choice. Weight trend: stable  Prior visit with dietician: yes  Current diet: on average, 2-3 meals per day  Current exercise: walks     Current monitoring regimen: home blood tests - 2 times daily  Has brought blood glucose log/meter:  No  Home blood sugar records: fasting range:  and postprandial range: 100-140  Any episodes of hypoglycemia? Yes  Hypoglycemia frequency and time(s):  one episode in 3 months  Does patient have Glucagon emergency kit? No  Does patient have rapid acting carbohydrate?  No  Does patient wear a medic alert bracelet or necklace?  No     2. Mixed hyperlipidemia  No muscle pain.     3. Essential hypertension  No headaches.     4. Obesity  Tries to eat healthier. Lower carbs.     5. History of Hyperthyroidism      10.3 mCi 131 capsule was administered orally on on 2019.           TSH <0.005, FT4 1.41  TSH low since 2017 2.19-0.18-0.051-<005  No palpitations, anxiety, had sleep problems for a long time. No family Hx of thyroid problems.     6.  Multinodular Goiter  No thyroid cancer in family. No radiation in her neck.     7. Elevated alkaline phosphatase level  No bone pain.     8.  Hypothyroidism  Has fatigue, similar to before.       Department of Pathology  FINAL CYTOLOGY REPORT  Patient Name: Franklin Oh               Accession No:  [id number]   Age Sex:   1955    63 Y / F       Location:     recent ultrasound.           Impression   1.  Elevated 24 hour uptake of 54%.  Increased uptake corresponds to the   dominant nodule in the right thyroid lobe with heterogeneous uptake in the   left thyroid lobe.  This could represent multinodular goiter or possibly a   nodular form of Graves disease.               Past Medical History:   Diagnosis Date    Class 2 obesity with body mass index (BMI) of 38.0 to 38.9 in adult 2/13/2019    Hyperlipidemia     Hypertension     Kidney stone     Ovarian mass     Ovarian tumor     Thyroid disease     Type II or unspecified type diabetes mellitus without mention of complication, not stated as uncontrolled       Patient Active Problem List   Diagnosis    HTN (hypertension)    Mixed hyperlipidemia    Abnormal EKG    DM type 2, uncontrolled, with neuropathy ([facility] [address]    History of hyperthyroidism    Elevated alkaline phosphatase level    Multinodular goiter    Postablative hypothyroidism    Class 2 obesity with body mass index (BMI) of 39.0 to 39.9 in adult    Optic neuropathy, ischemic, arteritic, right     Past Surgical History:   Procedure Laterality Date    HYSTERECTOMY      OVARY REMOVAL Bilateral     SALPINGO-OOPHORECTOMY N/A 12/10/2019    ROBOTIC ASSISTED OPERATIVE LAPAROSCOPY, BILATERAL SALPINGO-OOPHORECTOMY, DERMOID CYST IN LEFT OVARY, LYSIS OF ADHESIONS performed by Stanislav Fuller MD at [address] [place] [address] History     Socioeconomic History    Marital status: Single     Spouse name: Not on file    Number of children: Not on file    Years of education: Not on file    Highest education level: Not on file   Occupational History    Not on file   Social Needs    Financial resource strain: Not on file    Food insecurity     Worry: Not on file     Inability: Not on file    Transportation needs     Medical: Not on file     Non-medical: Not on file   Tobacco Use    Smoking status: Never Smoker    Smokeless tobacco: Never Used Substance and Sexual Activity    Alcohol use: No    Drug use: No    Sexual activity: Never   Lifestyle    Physical activity     Days per week: Not on file     Minutes per session: Not on file    Stress: Not on file   Relationships    Social connections     Talks on phone: Not on file     Gets together: Not on file     Attends [employer] service: Not on file     Active member of club or organization: Not on file     Attends meetings of clubs or organizations: Not on file     Relationship status: Not on file    Intimate partner violence     Fear of current or ex partner: Not on file     Emotionally abused: Not on file     Physically abused: Not on file     Forced sexual activity: Not on file   Other Topics Concern    Not on file   Social History Narrative    Not on file     Family History   Problem Relation Age of Onset    Heart Disease Mother     Diabetes Mother     Diabetes Father     COPD Father     Diabetes Maternal Aunt     Heart Disease Brother     Cancer Sister      Current Outpatient Medications   Medication Sig Dispense Refill    metFORMIN (GLUCOPHAGE XR) 500 MG extended release tablet Take 4 tablets by mouth daily 360 tablet 1    glipiZIDE (GLUCOTROL XL) 10 MG extended release tablet Take 1 tablet by mouth 2 times daily 180 tablet 1    levothyroxine (SYNTHROID) 88 MCG tablet Take 1 tablet 6 days a week, one and a 1/2 tablet 1 day a week 100 tablet 1    allopurinol (ZYLOPRIM) 300 MG tablet Take 1 tablet by mouth daily      Potassium Citrate ER 15 MEQ (1620 MG) TBCR       Cholecalciferol (VITAMIN D3) 5000 units TABS Take one tab qod 30 tablet 0    insulin glargine (LANTUS SOLOSTAR) 100 UNIT/ML injection pen Inject 60 Units into the skin      furosemide (LASIX) 20 MG tablet Take 20 mg by mouth as needed Patient takes PRN      ibuprofen (ADVIL;MOTRIN) 200 MG tablet Take 200 mg by mouth every 6 hours as needed for Pain      simvastatin (ZOCOR) 20 MG tablet Take 10 mg by mouth nightly  lisinopril-hydrochlorothiazide (PRINZIDE;ZESTORETIC) 20-25 MG per tablet Take 1 tablet by mouth daily.  aspirin 81 MG tablet Take 81 mg by mouth daily.  clopidogrel (PLAVIX) 75 MG tablet Take 75 mg by mouth daily Patient has not started this medication yet      tamsulosin (FLOMAX) 0.4 MG capsule Take 1 capsule by mouth daily for 5 doses 5 capsule 0     No current facility-administered medications for this visit.       No Known Allergies  Family Status   Relation Name Status    Mother      Father     Mary Jo Levin  (Not Specified)    Sister  Alive    Brother  Alive    Sister         Lab Review:    Lab Results   Component Value Date    WBC 8.9 05/15/2020    HGB 11.7 05/15/2020    HCT 35.6 05/15/2020    MCV 78.2 05/15/2020     05/15/2020     Lab Results   Component Value Date     2020    K 4.2 2020    K 3.3 2019     2020    CO2 29 2020    BUN 27 2020    CREATININE 1.0 2020    GLUCOSE 71 2020    CALCIUM 9.6 2020    PROT 7.3 2020    LABALBU 3.9 2020    BILITOT 0.3 2020    ALKPHOS 151 2020    AST 23 2020    ALT 10 2020    LABGLOM 56 2020    GFRAA >60 2020    AGRATIO 1.1 2020    GLOB 3.4 2020     Lab Results   Component Value Date    TSH 4.31 2020    FT3 2.4 2020     Lab Results   Component Value Date    LABA1C 8.6 2020     Lab Results   Component Value Date    .1 2020     Lab Results   Component Value Date    CHOL 155 2020     Lab Results   Component Value Date    TRIG 93 2020     Lab Results   Component Value Date    HDL 56 2020     Lab Results   Component Value Date    LDLCALC 80 2020     Lab Results   Component Value Date    LABVLDL 19 2020     No results found for: [facility]  Lab Results   Component Value Date    LABMICR <1.20 2020    LABMICR YES 2019     Lab Results Component Value Date    VITD25 64.4 12/03/2020        Review of Systems:  Constitutional: has fatigue, no fever, no recent weight gain, has recent weight loss, has changes in appetite  Eyes: no eye pain, no change in vision, no eye redness, no eye irritation, no double vision  Ears, nose, throat: no nasal congestion, no sore throat, no earache, no decrease in hearing, no hoarseness, no dry mouth, no sinus problems, no difficulty swallowing, no neck lumps, no dental problems, no mouth sores, no ringing in ears  Pulmonary: no shortness of breath, no wheezing, no dyspnea on exertion, no cough  Cardiovascular: no chest pain, has lower extremity edema, no orthopnea, no intermittent leg claudication, no palpitations  Gastrointestinal: no abdominal pain, no nausea, no vomiting, no diarrhea, no constipation, no dysphagia, no heartburn, no bloating  Genitourinary: no dysuria, no urinary incontinence, no urinary hesitancy, no urinary frequency, no feelings of urinary urgency, no nocturia  Musculoskeletal: no joint swelling, no joint stiffness, has joint pain, no muscle cramps, no muscle pain, no bone pain  Integument/Breast: no hair loss, no skin rashes, no skin lesions, no itching, no dry skin  Neurological: no numbness, no tingling, no weakness, no confusion, no headaches, no dizziness, no fainting, no tremors, no decrease in memory, no balance problems  Psychiatric: no anxiety, no depression, has insomnia  Hematologic/Lymphatic: no tendency for easy bleeding, no swollen lymph nodes, has tendency for easy bruising  Immunology: no seasonal allergies, no frequent infections, no frequent illnesses  Endocrine: no temperature intolerance    /80   Pulse 78   Temp 97.7 °F (36.5 °C)   Resp 14   Ht 5' 2\" (1.575 m)   Wt 217 lb (98.4 kg)   LMP  (LMP Unknown)   BMI 39.69 kg/m²    Wt Readings from Last 3 Encounters:   12/10/20 217 lb (98.4 kg)   11/18/20 217 lb (98.4 kg)   09/04/20 217 lb (98.4 kg)     Body mass index is 39.69 kg/m². OBJECTIVE:  Constitutional: no acute distress, well appearing and well nourished  Psychiatric: oriented to person, place and time, judgement and insight and normal, recent and remote memory and intact and mood and affect are normal  Skin: skin and subcutaneous tissue is normal without mass, normal turgor  Head and Face: examination of head and face revealed no abnormalities  Eyes: no lid or conjunctival swelling, erythema or discharge, pupils are normal, equal, round, reactive to light  Ears/Nose: external inspection of ears and nose revealed no abnormalities, hearing is grossly normal  Oropharynx/Mouth/Face: lips, tongue and gums are normal with no lesions, the voice quality was normal  Neck: neck is supple and symmetric, with midline trachea and no masses, thyroid is normal  Lymphatics: normal cervical lymph nodes, normal supraclavicular nodes  Pulmonary: no increased work of breathing or signs of respiratory distress, lungs are clear to auscultation  Cardiovascular: normal heart rate and rhythm, normal S1 and S2, no murmurs and pedal pulses and 2+ bilaterally, 1+ edema  Abdomen: abdomen is soft, non-tender with no masses  Musculoskeletal: normal gait and station and exam of the digits and nails are normal  Neurological: normal coordination and normal general cortical function, has mild hand tremor      ASSESSMENT/PLAN:  1. DM type 2, uncontrolled, with neuropathy ([facility] [address]  Call for CMP results  Hemoglobin A1c 8.7-7.8-7.2-7.1-7.2-8.3-8.6  Uncontrolled. Worsening  Metformin 1000 mg bid  Glipizide 10 mg bid  Lantus 60 units qhs  Trulicity - did not take it. - Comprehensive Metabolic Panel; Future  - Hemoglobin A1C; Future  - Microalbumin / Creatinine Urine Ratio; Future  Having Urology evaluation     2. Mixed hyperlipidemia  LDL [id number]  - Lipid Panel; Future     3. Essential hypertension  Continue current treatment     4. Obesity  Diet, exercise.     5.  History of Hyperthyroidism  Had I-131 Tx.         10.3 mCi 131 capsule was administered orally on on 07/12/2019.           Nodule benign on FNA. TSH receptor antibody and TSI positive. Also positive TPO antibody.  - T3; Future  - T3, Free; Future  - T4, Free; Future  - T4; Future     6. Elevated alkaline phosphatase level  Normal PTH, normal calcium. Alkaline phosphatase, elevated - liver fraction. Referred to family doctor. Alk phos [phone]  - Alkaline Phosphatase, Isoenzymes; Future  - PTH, Intact; Future  - Vitamin D 25 Hydroxy; Future  - Phosphorus; Future  Vitamin D to 5000 IU qod.  25 hydroxy vitamin D 53-50     7. Multinodular Goiter  Repeated FNA 6/2019 by Dr. Adele Faulkner. Right 1.7 cm nodule nondiagnostic  Thyroid, Right Nodule Fine Needle Aspiration:     - Non-diagnostic  - US Head Neck Soft Tissue Thyroid; Future     8. Hypothyroidism, postablative  TSH 4.74-18.8-4.07-2.97-1.96-4.31  Continue levothyroxine 0.088 mg qd   - TSH  - FT4  - FT3     Reviewed and/or ordered clinical lab results Yes  Reviewed and/or ordered radiology tests Yes  Reviewed and/or ordered other     Reviewed and/or ordered clinical lab results Yes  Reviewed and/or ordered radiology tests Yes  Reviewed and/or ordered other diagnostic tests No  Discussed test results with performing physician No  Independently reviewed image, tracing, or specimen Yes, thyroid uptake and scan  Made a decision to obtain old records No  Reviewed and summarized old records Yes  TSH less than 0.005, PTH 30, FT4 1.41, ionized calcium 1.13, hemoglobin 11.8, hemoglobin A1c 8.3, LDL cholesterol 85, glucose 104, alkaline phosphatase 212.   Obtained history from other than patient No    Greyson Irene was counseled regarding symptoms of diabetes, elevated alk phos, hyperthyroidism diagnosis, course and complications of disease if inadequately treated, side effects of medications, diagnosis, treatment options, and prognosis, risks, benefits, complications, and alternatives of

## 2020-12-11 ENCOUNTER — HOSPITAL ENCOUNTER (OUTPATIENT)
Dept: CARDIOLOGY | Age: 65
Discharge: HOME | End: 2020-12-11
Payer: COMMERCIAL

## 2020-12-11 LAB
LV EF: 63 %
LVEF MODALITY: NORMAL

## 2020-12-11 PROCEDURE — 93306 TTE W/DOPPLER COMPLETE: CPT

## 2020-12-11 PROCEDURE — 93880 EXTRACRANIAL BILAT STUDY: CPT

## 2021-01-18 NOTE — TELEPHONE ENCOUNTER
PT received the wrong med refill from [facility]. She stated that at the time of her LOV 12/10/20 she requested a refill of her Metformin HCL  for a 90 day supply through [facility]. She received Metformin HCL ER from [facility]. She would like to know did we make the mistake on our end? Or is it the mistake of [facility]? And what can she do about it?

## 2021-01-18 NOTE — TELEPHONE ENCOUNTER
[phone] (home) [phone] (work)      Spoke with patient and she informs that she has spoke with provider. Patient is going to bring both bottles with her on the next appointment.

## 2021-02-20 ENCOUNTER — HOSPITAL ENCOUNTER (OUTPATIENT)
Age: 66
Discharge: HOME | End: 2021-02-20
Payer: COMMERCIAL

## 2021-02-20 DIAGNOSIS — E89.0 POSTABLATIVE HYPOTHYROIDISM: ICD-10-CM

## 2021-02-20 DIAGNOSIS — E04.2 MULTINODULAR GOITER: ICD-10-CM

## 2021-02-20 DIAGNOSIS — I10 ESSENTIAL HYPERTENSION: ICD-10-CM

## 2021-02-20 DIAGNOSIS — Z86.39 HISTORY OF HYPERTHYROIDISM: ICD-10-CM

## 2021-02-20 DIAGNOSIS — E78.2 MIXED HYPERLIPIDEMIA: ICD-10-CM

## 2021-02-20 DIAGNOSIS — R74.8 ELEVATED ALKALINE PHOSPHATASE LEVEL: ICD-10-CM

## 2021-02-20 LAB
A/G RATIO: 1.2 (ref 1.1–2.2)
ALBUMIN SERPL-MCNC: 3.8 G/DL (ref 3.4–5)
ALP BLD-CCNC: 164 U/L (ref 40–129)
ALT SERPL-CCNC: 11 U/L (ref 10–40)
ANION GAP SERPL CALCULATED.3IONS-SCNC: 9 MMOL/L (ref 3–16)
AST SERPL-CCNC: 23 U/L (ref 15–37)
BILIRUB SERPL-MCNC: 0.3 MG/DL (ref 0–1)
BUN BLDV-MCNC: 22 MG/DL (ref 7–20)
CALCIUM SERPL-MCNC: 9.2 MG/DL (ref 8.3–10.6)
CHLORIDE BLD-SCNC: 102 MMOL/L (ref 99–110)
CHOLESTEROL, TOTAL: 150 MG/DL (ref 0–199)
CO2: 30 MMOL/L (ref 21–32)
CREAT SERPL-MCNC: 0.8 MG/DL (ref 0.6–1.2)
CREATININE URINE: 97.1 MG/DL (ref 28–259)
GFR AFRICAN AMERICAN: >60
GFR NON-AFRICAN AMERICAN: >60
GLOBULIN: 3.3 G/DL
GLUCOSE BLD-MCNC: 70 MG/DL (ref 70–99)
HDLC SERPL-MCNC: 54 MG/DL (ref 40–60)
LDL CHOLESTEROL CALCULATED: 78 MG/DL
MICROALBUMIN UR-MCNC: <1.2 MG/DL
MICROALBUMIN/CREAT UR-RTO: NORMAL MG/G (ref 0–30)
POTASSIUM SERPL-SCNC: 3.9 MMOL/L (ref 3.5–5.1)
SODIUM BLD-SCNC: 141 MMOL/L (ref 136–145)
T3 FREE: 2.4 PG/ML (ref 2.3–4.2)
T4 FREE: 1.4 NG/DL (ref 0.9–1.8)
TOTAL PROTEIN: 7.1 G/DL (ref 6.4–8.2)
TRIGL SERPL-MCNC: 88 MG/DL (ref 0–150)
TSH SERPL DL<=0.05 MIU/L-ACNC: 1.97 UIU/ML (ref 0.27–4.2)
VITAMIN D 25-HYDROXY: 56 NG/ML
VLDLC SERPL CALC-MCNC: 18 MG/DL

## 2021-02-20 PROCEDURE — 84481 FREE ASSAY (FT-3): CPT

## 2021-02-20 PROCEDURE — 82306 VITAMIN D 25 HYDROXY: CPT

## 2021-02-20 PROCEDURE — 84443 ASSAY THYROID STIM HORMONE: CPT

## 2021-02-20 PROCEDURE — 84439 ASSAY OF FREE THYROXINE: CPT

## 2021-02-20 PROCEDURE — 36415 COLL VENOUS BLD VENIPUNCTURE: CPT

## 2021-02-20 PROCEDURE — 82570 ASSAY OF URINE CREATININE: CPT

## 2021-02-20 PROCEDURE — 82043 UR ALBUMIN QUANTITATIVE: CPT

## 2021-02-20 PROCEDURE — 83036 HEMOGLOBIN GLYCOSYLATED A1C: CPT

## 2021-02-20 PROCEDURE — 80061 LIPID PANEL: CPT

## 2021-02-20 PROCEDURE — 80053 COMPREHEN METABOLIC PANEL: CPT

## 2021-02-21 LAB
ESTIMATED AVERAGE GLUCOSE: 197.3 MG/DL
HBA1C MFR BLD: 8.5 %

## 2021-03-08 ENCOUNTER — OFFICE VISIT (OUTPATIENT)
Dept: ENDOCRINOLOGY | Age: 66
End: 2021-03-08
Payer: COMMERCIAL

## 2021-03-08 VITALS
OXYGEN SATURATION: 97 % | WEIGHT: 216 LBS | HEIGHT: 62 IN | HEART RATE: 70 BPM | DIASTOLIC BLOOD PRESSURE: 76 MMHG | BODY MASS INDEX: 39.75 KG/M2 | SYSTOLIC BLOOD PRESSURE: 128 MMHG | RESPIRATION RATE: 14 BRPM

## 2021-03-08 DIAGNOSIS — I10 ESSENTIAL HYPERTENSION: ICD-10-CM

## 2021-03-08 DIAGNOSIS — R74.8 ELEVATED ALKALINE PHOSPHATASE LEVEL: ICD-10-CM

## 2021-03-08 DIAGNOSIS — E78.2 MIXED HYPERLIPIDEMIA: ICD-10-CM

## 2021-03-08 DIAGNOSIS — E04.2 MULTINODULAR GOITER: ICD-10-CM

## 2021-03-08 DIAGNOSIS — E66.9 CLASS 2 OBESITY WITHOUT SERIOUS COMORBIDITY WITH BODY MASS INDEX (BMI) OF 39.0 TO 39.9 IN ADULT, UNSPECIFIED OBESITY TYPE: ICD-10-CM

## 2021-03-08 DIAGNOSIS — Z86.39 HISTORY OF HYPERTHYROIDISM: ICD-10-CM

## 2021-03-08 DIAGNOSIS — E89.0 POSTABLATIVE HYPOTHYROIDISM: ICD-10-CM

## 2021-03-08 PROCEDURE — 99213 OFFICE O/P EST LOW 20 MIN: CPT | Performed by: INTERNAL MEDICINE

## 2021-03-08 PROCEDURE — 3052F HG A1C>EQUAL 8.0%<EQUAL 9.0%: CPT | Performed by: INTERNAL MEDICINE

## 2021-03-08 NOTE — PROGRESS NOTES
Avel Quezada is a 72 y.o. female who presents for Type 2 diabetes mellitus. Current symptoms/problems include hyperglycemia and are worsening. 1.  DM type 2, uncontrolled, with neuropathy ([facility] [address] [E11.40, E11.65]     Diagnosed with Type 2 diabetes mellitus in 2012. Comorbid conditions: hyperlipidemia and Neuropathy     Current diabetic medications include: metformin  mg 4 tablets daily, glipizide XL 10 mg bid, lantus 60 units qhs     Had ovarian tumor removed and hysterectomy. Had kidney stones.     Intolerance to diabetes medications: No  Had yeast infections. Bella Degree may not be a good choice. Weight trend: stable  Prior visit with dietician: yes  Current diet: on average, 2-3 meals per day  Current exercise: walks     Current monitoring regimen: home blood tests - 2 times daily  Has brought blood glucose log/meter:  No  Home blood sugar records: fasting range:  and postprandial range: 100-140  Any episodes of hypoglycemia? Yes  Hypoglycemia frequency and time(s):  one episode in 3 months  Does patient have Glucagon emergency kit? No  Does patient have rapid acting carbohydrate?  No  Does patient wear a medic alert bracelet or necklace?  No     2. Mixed hyperlipidemia  No muscle pain.     3. Essential hypertension  No headaches.     4. Obesity  Tries to eat healthier. Lower carbs.     5. History of Hyperthyroidism      10.3 mCi 131 capsule was administered orally on on 07/12/2019.           TSH <0.005, FT4 1.41  TSH low since 2017 2.19-0.18-0.051-<005  No palpitations, anxiety, had sleep problems for a long time. No family Hx of thyroid problems.     6.  Multinodular Goiter  No thyroid cancer in family. No radiation in her neck.     7. Elevated alkaline phosphatase level  No bone pain.     8.  Hypothyroidism  Has fatigue, similar to before.     EXAMINATION:   THYROID ULTRASOUND       10/3/2020       COMPARISON:   None.       HISTORY:   ORDERING SYSTEM PROVIDED HISTORY: Multinodular goiter     FINDINGS:   Right thyroid lobe:  3.8 x 1.1 x 1.2 cm       Left thyroid lobe:  2.6 x 1 x 1.6 cm       Isthmus:  3 mm thickness       Thyroid Gland:  Thyroid gland demonstrates mildly diffusely heterogeneous   echotexture and unremarkable vascularity.       Nodules: No thyroid nodules are present.       Cervical lymphadenopathy: Patient describes palpable abnormality at the left   side of the neck corresponding to a normal appearing 7 x 9 x 13 mm lymph node   along the lateral margin of the thyroid gland.  No abnormal lymph nodes in   the imaged portions of the neck.           Impression   Unremarkable thyroid ultrasound.           Department of Pathology  FINAL CYTOLOGY REPORT  Patient Name: Clemente Jones               Accession No:  [id number]   Age Sex:   1955    63 Y / F       Location:      [facility]  Account No:   [de-identified]                  Collected:     2019  [facility] Rec No:    [id number]                 Received:      2019  Attend Phys:   Kenny Jimenez MD            Completed:     2019  Perform Phys: Kenny Jimenez MD    FINAL DIAGNOSIS:    Thyroid, Right Nodule Fine Needle Aspiration:     - Non-diangostic    COMMENT:    Insufficient well stained/preserved follicular cells present  for analysis (<6 groups of 10).    LANA/LANA    CLINICAL DIAGNOSIS:    Diagnosis: E04.1    SPECIMEN:  THYROID, FNA: RIGHT THYROID NODULE 1.4X1.7 C.M.    GROSS DESCRIPTION:  Received are 10 smeared slides and a specimen in  cytolyt.  LANA/LANA    MICROSCOPIC DESCRIPTION: Microscopic examination performed, including a  monolayer slide and H&E stained cell block section.     CPT: [id number] X1   [id number] X1    Case signed out at [facility], [address]  Specimen was processed and screened at [facility],  [address] [place] [address]      EXAMINATION:   I123 Thyroid Uptake and Scan. 3/13/2019 8:10 am       TECHNIQUE:   318 microcuries I123 sodium iodide was administered p. o. Then, thyroid   uptake measurements were performed at 4 and 24 hours.  Pinhole images of the   thyroid were obtained at 4 hours.       COMPARISON:   Ultrasound 02/21/2019       HISTORY:   ORDERING SYSTEM PROVIDED HISTORY: Hyperthyroidism   TECHNOLOGIST PROVIDED HISTORY:   Ordering Physician Provided Reason for Exam: Hyperthyroidism; Multinodular   Goiter   Acuity: Unknown   Type of Exam: Initial       FINDINGS:   Thyroid uptake at 4 hours measured 29%.  Normal range at 4-6 hours is 6-18%.     Thyroid uptake at 24 hours measured 54%.  Normal range at 24 hours is 10-35%.        There is heterogeneous distribution of radiotracer, asymmetrically increased   in the right thyroid lobe compared to the left. Trice Spine is a dominant nodule   in the right thyroid lobe on the recent ultrasound.           Impression   1.  Elevated 24 hour uptake of 54%.  Increased uptake corresponds to the   dominant nodule in the right thyroid lobe with heterogeneous uptake in the   left thyroid lobe.  This could represent multinodular goiter or possibly a   nodular form of Graves disease.               Past Medical History:   Diagnosis Date    Class 2 obesity with body mass index (BMI) of 38.0 to 38.9 in adult 2/13/2019    Hyperlipidemia     Hypertension     Kidney stone     Ovarian mass     Ovarian tumor     Thyroid disease     Type II or unspecified type diabetes mellitus without mention of complication, not stated as uncontrolled       Patient Active Problem List   Diagnosis    HTN (hypertension)    Mixed hyperlipidemia    Abnormal EKG    DM type 2, uncontrolled, with neuropathy ([address]    History of hyperthyroidism    Elevated alkaline phosphatase level    Multinodular goiter    Postablative hypothyroidism    Class 2 obesity with body mass index (BMI) of 39.0 to 39.9 in adult    Optic neuropathy, ischemic, arteritic, right     Past Surgical History:   Procedure Laterality Date    HYSTERECTOMY      OVARY REMOVAL Bilateral     SALPINGO-OOPHORECTOMY N/A 12/10/2019    ROBOTIC ASSISTED OPERATIVE LAPAROSCOPY, BILATERAL SALPINGO-OOPHORECTOMY, DERMOID CYST IN LEFT OVARY, LYSIS OF ADHESIONS performed by Daphne Garibay MD at [address] [place] [address] History     Socioeconomic History    Marital status: Single     Spouse name: Not on file    Number of children: Not on file    Years of education: Not on file    Highest education level: Not on file   Occupational History    Not on file   Social Needs    Financial resource strain: Not on file    Food insecurity     Worry: Not on file     Inability: Not on file    Transportation needs     Medical: Not on file     Non-medical: Not on file   Tobacco Use    Smoking status: Never Smoker    Smokeless tobacco: Never Used   Substance and Sexual Activity    Alcohol use: No    Drug use: No    Sexual activity: Never   Lifestyle    Physical activity     Days per week: Not on file     Minutes per session: Not on file    Stress: Not on file   Relationships    Social connections     Talks on phone: Not on file     Gets together: Not on file     Attends Sikh service: Not on file     Active member of club or organization: Not on file     Attends meetings of clubs or organizations: Not on file     Relationship status: Not on file    Intimate partner violence     Fear of current or ex partner: Not on file     Emotionally abused: Not on file     Physically abused: Not on file     Forced sexual activity: Not on file   Other Topics Concern    Not on file   Social History Narrative    Not on file     Family History   Problem Relation Age of Onset    Heart Disease Mother     Diabetes Mother     Diabetes Father     COPD Father     Diabetes Maternal Aunt     Heart Disease Brother     Cancer Sister      Current Outpatient Medications   Medication Sig Dispense Refill    metFORMIN (GLUCOPHAGE XR) 500 MG extended release tablet Take 4 tablets by mouth daily 360 tablet 1    glipiZIDE (GLUCOTROL XL) 10 MG extended release tablet Take 1 tablet by mouth 2 times daily 180 tablet 1    levothyroxine (SYNTHROID) 88 MCG tablet Take 1 tablet 6 days a week, one and a 1/2 tablet 1 day a week 100 tablet 1    allopurinol (ZYLOPRIM) 300 MG tablet Take 1 tablet by mouth daily      Potassium Citrate ER 15 MEQ (1620 MG) TBCR       Cholecalciferol (VITAMIN D3) 5000 units TABS Take one tab qod 30 tablet 0    insulin glargine (LANTUS SOLOSTAR) 100 UNIT/ML injection pen Inject 60 Units into the skin      furosemide (LASIX) 20 MG tablet Take 20 mg by mouth as needed Patient takes PRN      ibuprofen (ADVIL;MOTRIN) 200 MG tablet Take 200 mg by mouth every 6 hours as needed for Pain      simvastatin (ZOCOR) 20 MG tablet Take 10 mg by mouth nightly       lisinopril-hydrochlorothiazide (PRINZIDE;ZESTORETIC) 20-25 MG per tablet Take 1 tablet by mouth daily.  aspirin 81 MG tablet Take 81 mg by mouth daily.  clopidogrel (PLAVIX) 75 MG tablet Take 75 mg by mouth daily Patient has not started this medication yet      tamsulosin (FLOMAX) 0.4 MG capsule Take 1 capsule by mouth daily for 5 doses 5 capsule 0     No current facility-administered medications for this visit.       No Known Allergies  Family Status   Relation Name Status    Mother      Father     Orvis Dandy  (Not Specified)    Sister  Alive    Brother  Alive    Sister         Lab Review:    Lab Results   Component Value Date    WBC 8.9 05/15/2020    HGB 11.7 05/15/2020    HCT 35.6 05/15/2020    MCV 78.2 05/15/2020     05/15/2020     Lab Results   Component Value Date     2021    K 3.9 2021    K 3.3 2019     2021    CO2 30 2021    BUN 22 2021    CREATININE 0.8 2021    GLUCOSE 70 2021    CALCIUM 9.2 2021    PROT 7.1 2021    LABALBU 3.8 2021    BILITOT 0.3 2021    ALKPHOS 164 02/20/2021    AST 23 02/20/2021    ALT 11 02/20/2021    LABGLOM >60 02/20/2021    GFRAA >60 02/20/2021    AGRATIO 1.2 02/20/2021    GLOB 3.3 02/20/2021     Lab Results   Component Value Date    TSH 1.97 02/20/2021    FT3 2.4 02/20/2021     Lab Results   Component Value Date    LABA1C 8.5 02/20/2021     Lab Results   Component Value Date    .3 02/20/2021     Lab Results   Component Value Date    CHOL 150 02/20/2021     Lab Results   Component Value Date    TRIG 88 02/20/2021     Lab Results   Component Value Date    HDL 54 02/20/2021     Lab Results   Component Value Date    LDLCALC 78 02/20/2021     Lab Results   Component Value Date    LABVLDL 18 02/20/2021     No results found for: [facility]  Lab Results   Component Value Date    LABMICR <1.20 02/20/2021    LABMICR YES 12/06/2019     Lab Results   Component Value Date    VITD25 56.0 02/20/2021        Review of Systems:  Constitutional: has fatigue, no fever, no recent weight gain, has recent weight loss, has changes in appetite  Eyes: no eye pain, no change in vision, no eye redness, no eye irritation, no double vision  Ears, nose, throat: no nasal congestion, no sore throat, no earache, no decrease in hearing, no hoarseness, no dry mouth, no sinus problems, no difficulty swallowing, no neck lumps, no dental problems, no mouth sores, no ringing in ears  Pulmonary: no shortness of breath, no wheezing, no dyspnea on exertion, no cough  Cardiovascular: no chest pain, has lower extremity edema, no orthopnea, no intermittent leg claudication, no palpitations  Gastrointestinal: no abdominal pain, no nausea, no vomiting, no diarrhea, no constipation, no dysphagia, no heartburn, no bloating  Genitourinary: no dysuria, no urinary incontinence, no urinary hesitancy, no urinary frequency, no feelings of urinary urgency, no nocturia  Musculoskeletal: no joint swelling, no joint stiffness, has joint pain, no muscle cramps, no muscle pain, no bone pain  Integument/Breast: no hair loss, no skin rashes, no skin lesions, no itching, no dry skin  Neurological: no numbness, no tingling, no weakness, no confusion, no headaches, no dizziness, no fainting, no tremors, no decrease in memory, no balance problems  Psychiatric: no anxiety, no depression, has insomnia  Hematologic/Lymphatic: no tendency for easy bleeding, no swollen lymph nodes, has tendency for easy bruising  Immunology: no seasonal allergies, no frequent infections, no frequent illnesses  Endocrine: no temperature intolerance    /76   Pulse 70   Resp 14   Ht 5' 2\" (1.575 m)   Wt 216 lb (98 kg)   LMP  (LMP Unknown)   SpO2 97%   BMI 39.51 kg/m²    Wt Readings from Last 3 Encounters:   03/08/21 216 lb (98 kg)   12/10/20 217 lb (98.4 kg)   11/18/20 217 lb (98.4 kg)     Body mass index is 39.51 kg/m².       OBJECTIVE:  Constitutional: no acute distress, well appearing and well nourished  Psychiatric: oriented to person, place and time, judgement and insight and normal, recent and remote memory and intact and mood and affect are normal  Skin: skin and subcutaneous tissue is normal without mass, normal turgor  Head and Face: examination of head and face revealed no abnormalities  Eyes: no lid or conjunctival swelling, erythema or discharge, pupils are normal, equal, round, reactive to light  Ears/Nose: external inspection of ears and nose revealed no abnormalities, hearing is grossly normal  Oropharynx/Mouth/Face: lips, tongue and gums are normal with no lesions, the voice quality was normal  Neck: neck is supple and symmetric, with midline trachea and no masses, thyroid is normal  Lymphatics: normal cervical lymph nodes, normal supraclavicular nodes  Pulmonary: no increased work of breathing or signs of respiratory distress, lungs are clear to auscultation  Cardiovascular: normal heart rate and rhythm, normal S1 and S2, no murmurs and pedal pulses and 2+ bilaterally, 1+ edema  Abdomen: abdomen is soft, non-tender with no masses  Musculoskeletal: normal gait and station and exam of the digits and nails are normal  Neurological: normal coordination and normal general cortical function, has mild hand tremor    Visual inspection:  Deformity/amputation: absent  Skin lesions/pre-ulcerative calluses: absent  Edema: right- negative, left- negative     Sensory exam:  Monofilament sensation: normal  (minimum of 5 random plantar locations tested, avoiding callused areas - > 1 area with absence of sensation is + for neuropathy)     Plus at least one of the following:  Pulses: normal  Proprioception: Intact  Vibration (128 Hz): Impaired    ASSESSMENT/PLAN:  1. DM type 2, uncontrolled, with neuropathy (HCC)      Hemoglobin A1c 8.7-7.8-7.27.17.28.3-8.6-8.5  Uncontrolled. Next appointment will add more treatment   Refused dietician, will do close to home  Glipizide 10 mg ER bid  Lantus 60 units qhs  Trulicity - did not take it. - Comprehensive Metabolic Panel; Future  - Hemoglobin A1C; Future  - Microalbumin / Creatinine Urine Ratio; Future  Having Urology evaluation     2. Mixed hyperlipidemia  LDL 4123760842-76-44  - Lipid Panel; Future     3. Essential hypertension  Continue current treatment     4. Obesity  Diet, exercise.     5. History of Hyperthyroidism  Had I-131 Tx.         10.3 mCi 131 capsule was administered orally on on 07/12/2019.           Nodule benign on FNA. TSH receptor antibody and TSI positive. Also positive TPO antibody.  - T3; Future  - T3, Free; Future  - T4, Free; Future  - T4; Future     6. Elevated alkaline phosphatase level  Normal PTH, normal calcium. Alkaline phosphatase, elevated - liver fraction. Referred to family doctor. Alk phos [phone]  - Alkaline Phosphatase, Isoenzymes; Future  - PTH, Intact; Future  - Vitamin D 25 Hydroxy; Future  - Phosphorus;  Future  Vitamin D to 5000 IU qod.  25 hydroxy vitamin D 7610-56     7.  Multinodular Goiter  10/2020 thyroid nodule not seen  Repeated FNA 6/2019 by Dr. Desai Slider. Right 1.7 cm nodule nondiagnostic  Thyroid, Right Nodule Fine Needle Aspiration:     - Non-diagnostic  - US Head Neck Soft Tissue Thyroid; Future     8. Hypothyroidism, postablative  TSH 4.7418.84.072.971.96-4.31-1.97  Continue levothyroxine 0.088 mg 1 tablet 6 days a week, one and a half tablet 1 day a week  - TSH  - FT4  - FT3     Reviewed and/or ordered clinical lab results Yes  Reviewed and/or ordered radiology tests Yes  Reviewed and/or ordered other     Reviewed and/or ordered clinical lab results Yes  Reviewed and/or ordered radiology tests Yes  Reviewed and/or ordered other diagnostic tests No  Discussed test results with performing physician No  Independently reviewed image, tracing, or specimen Yes, thyroid uptake and scan  Made a decision to obtain old records No  Reviewed and summarized old records Yes  TSH less than 0.005, PTH 30, FT4 1.41, ionized calcium 1.13, hemoglobin 11.8, hemoglobin A1c 8.3, LDL cholesterol 85, glucose 104, alkaline phosphatase 212. Obtained history from other than patient No    Abdulkadir Aguilera was counseled regarding symptoms of diabetes, elevated alk phos, hyperthyroidism diagnosis, course and complications of disease if inadequately treated, side effects of medications, diagnosis, treatment options, and prognosis, risks, benefits, complications, and alternatives of treatment, labs, imaging and other studies and treatment targets and goals, insulin, medications, hyperthyroid treatment, I-131 Tx, permanent hypothyroidism. She understands instructions and counseling. Return in about 3 months (around 6/8/2021) for diabetes.

## 2021-05-10 ENCOUNTER — OFFICE VISIT (OUTPATIENT)
Dept: CARDIOLOGY CLINIC | Age: 66
End: 2021-05-10
Payer: COMMERCIAL

## 2021-05-10 VITALS
SYSTOLIC BLOOD PRESSURE: 132 MMHG | HEIGHT: 62 IN | WEIGHT: 219 LBS | DIASTOLIC BLOOD PRESSURE: 70 MMHG | HEART RATE: 70 BPM | BODY MASS INDEX: 40.3 KG/M2 | OXYGEN SATURATION: 98 %

## 2021-05-10 DIAGNOSIS — E78.2 MIXED HYPERLIPIDEMIA: ICD-10-CM

## 2021-05-10 DIAGNOSIS — I10 ESSENTIAL HYPERTENSION: Primary | ICD-10-CM

## 2021-05-10 PROCEDURE — 99214 OFFICE O/P EST MOD 30 MIN: CPT | Performed by: INTERNAL MEDICINE

## 2021-05-10 NOTE — PROGRESS NOTES
[facility]   Cardiac Evaluation      Patient: Shelly Marquez  YOB: 1955  Date: 5/10/21       Chief Complaint   Patient presents with    Hyperlipidemia    Hypertension        Referring provider: Christoph MCCRARY    History of Present Illness:   Ms Malik Valenzuela is seen today in follow up. She was previously seen in  for abnormal EKG findings. She had presented to pcp for pre-op clearance for EGD and EKG was performed. Osito Smith has a history of hypertension, hyperlipidemia, and diabetes, hyperthyroidism. She does not smoke or drink alcohol. Osito Smith is a  at [employer]. Surgeries include hysterectomy. Family history of mother having had CAD, is  ( after [facility]). Ms Malik Valenzuela has been following with Dr Mert Howe, neurologist, for shadow/floater in her eye. She was advised to have an echo for ? Small vessel disease w/ optic arteritis and ? TIA. She was given plavix and is taking asa along with it. She has been dismissed by the neurologist.     Today, Ms Malik Valenzuela states she has been ok. She denies any chest pain, palpitations, CONLEY, dizziness, or edema. She walks for exercise, but not regularly. Past Medical History:   has a past medical history of Class 2 obesity with body mass index (BMI) of 38.0 to 38.9 in adult, Hyperlipidemia, Hypertension, Kidney stone, Ovarian mass, Ovarian tumor, Thyroid disease, and Type II or unspecified type diabetes mellitus without mention of complication, not stated as uncontrolled. Surgical History:   has a past surgical history that includes Hysterectomy; [place] tooth extraction; Salpingo-oophorectomy (N/A, 12/10/2019); and Ovary removal (Bilateral). Social History:  History     Social History    Marital Status: Single     Spouse Name: N/A     Number of Children: N/A    Years of Education: N/A     Occupational History    [employer].      Social History Main Topics    Smoking status: Never Smoker     Smokeless tobacco: Not on file    Alcohol Use: No    Drug Use: Not on file    Sexually Active: Not on file     Other Topics Concern    Not on file     Social History Narrative    No narrative on file       Family History:  family history includes COPD in her father; Cancer in her sister; Diabetes in her father, maternal aunt, and mother; Heart Disease in her brother and mother. Allergies:  Patient has no known allergies. Review of Systems:   · Constitutional: there has been no unanticipated weight loss. No change in energy or activity level   · Eyes: No visual changes   · ENT: No Headaches, hearing loss or vertigo. No mouth sores or sore throat. · Cardiovascular: Reviewed in HPI  · Respiratory: No cough or wheezing, no sputum production. · Gastrointestinal: No abdominal pain, appetite loss, blood in stools. No change in bowel or bladder habits. · Genitourinary: No nocturia, dysuria, trouble voiding  · Musculoskeletal:  No gait disturbance, weakness or joint complaints. · Integumentary: No rash or pruritis. · Neurological: No headache, change in muscle strength, numbness or tingling. No change in gait, balance, coordination, mood, affect, memory, mentation, behavior. · Psychiatric: No anxiety or depression  · Endocrine: No malaise or fever  · Hematologic/Lymphatic: No abnormal bruising or bleeding, blood clots or swollen lymph nodes. · Allergic/Immunologic: No nasal congestion or hives. Physical Examination:    Vitals:    05/10/21 1457   BP: 132/70   Site: Left Upper Arm   Position: Sitting   Cuff Size: Medium Adult   Pulse: 70   SpO2: 98%   Weight: 219 lb (99.3 kg)   Height: 5' 2\" (1.575 m)     Body mass index is 40.06 kg/m².      Wt Readings from Last 3 Encounters:   05/10/21 219 lb (99.3 kg)   03/08/21 216 lb (98 kg)   12/10/20 217 lb (98.4 kg)      BP Readings from Last 3 Encounters:   05/10/21 132/70   03/08/21 128/76   12/10/20 138/80      Constitutional and General Appearance:  appears stated age Respiratory:  · Normal excursion and expansion without use of accessory muscles  · Resp Auscultation: Normal breath sounds without dullness  Cardiovascular:  · The apical impulses not displaced  · Heart is regular rate and rhythm with normal S1, S2  · The carotid upstroke is normal, no bruit noted   · JVP is not elevated  · Peripheral pulses are symmetrical  · There is no clubbing, cyanosis of the extremities  · No edema  · Femoral Arteries: 2+ and equal  · Pedal Pulses: 2+ and equal   Abdomen:  · No masses or tenderness  · Normal bowel sounds  Neurological/Psychiatric:  · Alert and oriented x3  · Moves all extremities well  · Exhibits normal gait balance and coordination      Assessment/Plan  1. HTN (hypertension)  -stable    2. Hyperlipemia -treated, follows with pcp, labs 2/20/21: ; TRIG 88; HDL 54; LDL 78   3. Abnormal EKG -performed today as interpreted by me shows normal sinus rhythm, left axis deviation. This is due to her body habitus. She has no cardiac symptoms nor previous MI. Echo 12/11/20: Normal left ventricle size, mild to moderate wall thickness and normal systolic function with EF 60-65%. No regional wall motion abnormalities are seen. Indeterminate diastolic function. E/e\"=10.15. Trivial pulmonic regurgitation present  Carotid doppler 12/11/20: <50% MAU, no stenosis LICA        PLAN:  From a cardiac standpoint Ms Kehinde Quesada appears stable. No med changes. Regular exercise encouraged. FU 1 year. Scribe's attestation: This note was scribed in the presence of Dr Bijal Santana MD by Sohail Gross RN. The scribe's documentation has been prepared under my direction and personally reviewed by me in its entirety. I confirm that the note above accurately reflects all work, treatment, procedures, and medical decision making performed by me. Thank you for allowing to me to participate in the care of Tello Hameed.

## 2021-05-21 DIAGNOSIS — E89.0 POSTABLATIVE HYPOTHYROIDISM: ICD-10-CM

## 2021-05-21 RX ORDER — LEVOTHYROXINE SODIUM 88 UG/1
TABLET ORAL
Qty: 100 TABLET | Refills: 0 | Status: SHIPPED | OUTPATIENT
Start: 2021-05-21 | End: 2021-08-24

## 2021-05-21 NOTE — TELEPHONE ENCOUNTER
Requested Prescriptions     Pending Prescriptions Disp Refills    levothyroxine (SYNTHROID) 88 MCG tablet [Pharmacy Med Name: L-THYROXINE (SYNTHROID) TABS 88MCG] 100 tablet 3     Sig: TAKE 1 TABLET SIX DAYS A WEEK, ONE AND ONE-HALF TABLETS ONE DAY A WEEK       LAST OV: 03/08/2021  NEXT OV: 6/28/2021

## 2021-06-23 ENCOUNTER — HOSPITAL ENCOUNTER (OUTPATIENT)
Age: 66
Discharge: HOME | End: 2021-06-23
Payer: COMMERCIAL

## 2021-06-23 DIAGNOSIS — E78.2 MIXED HYPERLIPIDEMIA: ICD-10-CM

## 2021-06-23 DIAGNOSIS — Z86.39 HISTORY OF HYPERTHYROIDISM: ICD-10-CM

## 2021-06-23 DIAGNOSIS — E04.2 MULTINODULAR GOITER: ICD-10-CM

## 2021-06-23 DIAGNOSIS — E89.0 POSTABLATIVE HYPOTHYROIDISM: ICD-10-CM

## 2021-06-23 LAB
A/G RATIO: 1.3 (ref 1.1–2.2)
ALBUMIN SERPL-MCNC: 3.9 G/DL (ref 3.4–5)
ALP BLD-CCNC: 148 U/L (ref 40–129)
ALT SERPL-CCNC: 8 U/L (ref 10–40)
ANION GAP SERPL CALCULATED.3IONS-SCNC: 13 MMOL/L (ref 3–16)
AST SERPL-CCNC: 20 U/L (ref 15–37)
BILIRUB SERPL-MCNC: 0.3 MG/DL (ref 0–1)
BUN BLDV-MCNC: 23 MG/DL (ref 7–20)
CALCIUM SERPL-MCNC: 9.1 MG/DL (ref 8.3–10.6)
CHLORIDE BLD-SCNC: 102 MMOL/L (ref 99–110)
CHOLESTEROL, TOTAL: 144 MG/DL (ref 0–199)
CO2: 28 MMOL/L (ref 21–32)
CREAT SERPL-MCNC: 0.9 MG/DL (ref 0.6–1.2)
ESTIMATED AVERAGE GLUCOSE: 203 MG/DL
GFR AFRICAN AMERICAN: >60
GFR NON-AFRICAN AMERICAN: >60
GLOBULIN: 3 G/DL
GLUCOSE BLD-MCNC: 80 MG/DL (ref 70–99)
HBA1C MFR BLD: 8.7 %
HDLC SERPL-MCNC: 50 MG/DL (ref 40–60)
LDL CHOLESTEROL CALCULATED: 78 MG/DL
POTASSIUM SERPL-SCNC: 4.1 MMOL/L (ref 3.5–5.1)
SODIUM BLD-SCNC: 143 MMOL/L (ref 136–145)
T3 FREE: 2.3 PG/ML (ref 2.3–4.2)
T4 FREE: 1.5 NG/DL (ref 0.9–1.8)
TOTAL PROTEIN: 6.9 G/DL (ref 6.4–8.2)
TRIGL SERPL-MCNC: 82 MG/DL (ref 0–150)
TSH REFLEX: 2.44 UIU/ML (ref 0.27–4.2)
VLDLC SERPL CALC-MCNC: 16 MG/DL

## 2021-06-23 PROCEDURE — 84481 FREE ASSAY (FT-3): CPT

## 2021-06-23 PROCEDURE — 83036 HEMOGLOBIN GLYCOSYLATED A1C: CPT

## 2021-06-23 PROCEDURE — 80061 LIPID PANEL: CPT

## 2021-06-23 PROCEDURE — 84075 ASSAY ALKALINE PHOSPHATASE: CPT

## 2021-06-23 PROCEDURE — 80053 COMPREHEN METABOLIC PANEL: CPT

## 2021-06-23 PROCEDURE — 84080 ASSAY ALKALINE PHOSPHATASES: CPT

## 2021-06-23 PROCEDURE — 84439 ASSAY OF FREE THYROXINE: CPT

## 2021-06-23 PROCEDURE — 36415 COLL VENOUS BLD VENIPUNCTURE: CPT

## 2021-06-23 PROCEDURE — 84443 ASSAY THYROID STIM HORMONE: CPT

## 2021-06-25 LAB
ALK PHOS OTHER CALC: 0 U/L
ALK PHOSPHATASE: 155 U/L (ref 40–120)
ALKALINE PHOSPHATASE BONE FRACTION: 42 U/L (ref 0–55)
ALKALINE PHOSPHATASE LIVER FRACTION: 113 U/L (ref 0–94)

## 2021-06-27 PROBLEM — E66.813 CLASS 3 SEVERE OBESITY WITH BODY MASS INDEX (BMI) OF 40.0 TO 44.9 IN ADULT: Status: ACTIVE | Noted: 2020-05-21

## 2021-06-27 PROBLEM — E66.01 CLASS 3 SEVERE OBESITY WITH BODY MASS INDEX (BMI) OF 40.0 TO 44.9 IN ADULT (HCC): Status: ACTIVE | Noted: 2020-05-21

## 2021-06-28 ENCOUNTER — OFFICE VISIT (OUTPATIENT)
Dept: ENDOCRINOLOGY | Age: 66
End: 2021-06-28
Payer: COMMERCIAL

## 2021-06-28 VITALS
DIASTOLIC BLOOD PRESSURE: 73 MMHG | WEIGHT: 218 LBS | HEIGHT: 62 IN | SYSTOLIC BLOOD PRESSURE: 129 MMHG | BODY MASS INDEX: 40.12 KG/M2 | HEART RATE: 89 BPM | OXYGEN SATURATION: 97 %

## 2021-06-28 DIAGNOSIS — E89.0 POSTABLATIVE HYPOTHYROIDISM: ICD-10-CM

## 2021-06-28 DIAGNOSIS — E04.2 MULTINODULAR GOITER: ICD-10-CM

## 2021-06-28 DIAGNOSIS — R74.8 ELEVATED ALKALINE PHOSPHATASE LEVEL: ICD-10-CM

## 2021-06-28 DIAGNOSIS — E78.2 MIXED HYPERLIPIDEMIA: ICD-10-CM

## 2021-06-28 DIAGNOSIS — E66.01 CLASS 3 SEVERE OBESITY WITHOUT SERIOUS COMORBIDITY WITH BODY MASS INDEX (BMI) OF 40.0 TO 44.9 IN ADULT, UNSPECIFIED OBESITY TYPE (HCC): ICD-10-CM

## 2021-06-28 DIAGNOSIS — Z86.39 HISTORY OF HYPERTHYROIDISM: ICD-10-CM

## 2021-06-28 DIAGNOSIS — I10 ESSENTIAL HYPERTENSION: ICD-10-CM

## 2021-06-28 PROCEDURE — 3052F HG A1C>EQUAL 8.0%<EQUAL 9.0%: CPT | Performed by: INTERNAL MEDICINE

## 2021-06-28 PROCEDURE — 99214 OFFICE O/P EST MOD 30 MIN: CPT | Performed by: INTERNAL MEDICINE

## 2021-06-28 RX ORDER — DULAGLUTIDE 0.75 MG/.5ML
0.75 INJECTION, SOLUTION SUBCUTANEOUS WEEKLY
Qty: 4 PEN | Refills: 3 | Status: SHIPPED | OUTPATIENT
Start: 2021-06-28 | End: 2021-09-27

## 2021-06-28 RX ORDER — GLIPIZIDE 10 MG/1
10 TABLET, FILM COATED, EXTENDED RELEASE ORAL 2 TIMES DAILY
Qty: 180 TABLET | Refills: 1 | Status: SHIPPED | OUTPATIENT
Start: 2021-06-28 | End: 2022-01-11 | Stop reason: SDUPTHER

## 2021-06-28 RX ORDER — INSULIN GLARGINE 100 [IU]/ML
60 INJECTION, SOLUTION SUBCUTANEOUS NIGHTLY
Qty: 5 PEN | Refills: 0
Start: 2021-06-28 | End: 2022-04-12

## 2021-06-28 NOTE — PROGRESS NOTES
Assessment/Plan:    *Diagnoses and all orders for this visit:    1. Upper respiratory tract infection, unspecified type  -     azithromycin (ZITHROMAX) 250 mg tablet; Take 2 tablets today, then take 1 tablet daily    2. Essential hypertension    Other orders  -     valsartan (DIOVAN) 160 mg tablet; Take 1 Tab by mouth daily. Physical in Sept.  Will do labs prior. Will change her BP med from Micardis to Diovan. Will see how this is working next time. The plan was discussed with the patient. The patient verbalized understanding and is in agreement with the plan. All medication potential side effects were discussed with the patient.    -------------------------------------------------------------------------------------------------------------------        Boo Tanner is a 68 y.o. female and presents with Hypertension and Cough         Subjective:  Pt was seen in the ER on 8/14. Was dx with URI, had a CXR which was clear. Was given a Rx cough syrup. Has not used anything OTC. HTN:  Not at goal and she was high in the ER. They told her to return to taking Micardis. Up until now, she has only been on HCTZ. ROS:  Review of Systems - Negative except as above        The problem list was updated as a part of today's visit. Patient Active Problem List   Diagnosis Code    Iron deficiency anemia D50.9    Type 2 diabetes mellitus without complication, without long-term current use of insulin ([facility] E11.9    Blood in stool K92.1    Secondary localized osteoarthrosis, lower leg M17.5    Unspecified otitis media H66.90    BPPV (benign paroxysmal positional vertigo) H81.10    Type 2 diabetes with nephropathy (HCC) E11.21    Severe obesity (BMI 35.0-39. 9) with comorbidity ([facility] E66.01    Colostomy present ([facility] [facility] Z93.3    Acute kidney injury ([facility] [facility] N17.9    Anemia of chronic disease D63.8    GERD without esophagitis K21.9    Hypoglycemia E16.2    Essential hypertension I10    Hyperkalemia E87.5       The PS,  were reviewed. SH:  Social History     Tobacco Use    Smoking status: Never Smoker    Smokeless tobacco: Never Used   Substance Use Topics    Alcohol use: No    Drug use: No       Medications/Allergies:  Current Outpatient Medications on File Prior to Visit   Medication Sig Dispense Refill    VITAMIN D2 50,000 unit capsule take 1 capsule by mouth every 7 days 12 Cap 1    peg 400-propylene glycol (SYSTANE, PROPYLENE GLYCOL,) 0.4-0.3 % drop as needed.  apixaban (ELIQUIS) 2.5 mg tablet Take 1 Tab by mouth two (2) times a day. (Patient taking differently: Take 5 mg by mouth two (2) times a day.) 60 Tab 2    hydroCHLOROthiazide (HYDRODIURIL) 25 mg tablet Take 25 mg by mouth daily. Indications: high blood pressure, Started 03/13/2019/ restarted by Dr Brionna Marks 3/27/2019      travoprost (TRAVATAN Z) 0.004 % ophthalmic solution Administer 1 Drop to both eyes nightly.  albuterol (PROVENTIL HFA, VENTOLIN HFA, PROAIR HFA) 90 mcg/actuation inhaler Take 2 Puffs by inhalation every six (6) hours as needed for Wheezing. 1 Inhaler 3    Blood-Glucose Meter (FREESTYLE LITE METER) monitoring kit For three times a day testing    DX: Z91.89;  E16.2, 1 Kit 0    glucose blood VI test strips (FREESTYLE LITE STRIPS) strip For three times a day testing    DX: Z91.89;  E16.2, 300 Strip 2    lancets (FREESTYLE LANCETS) 28 gauge misc For three times a day testing    DX: Z91.89;  E16.2, 300 Lancet 2    alcohol swabs (ALCOHOL PADS) padm For three times a day testing  DX: Z91.89;  E16.2, 300 Pad 2    iron bg,ps/vitC/B12/FA/calcium (NANCY FORTE PO) Take 1 Tab by mouth daily.  colesevelam (WELCHOL) 625 mg tablet Take 2 Tabs by mouth two (2) times daily (with meals). 360 Tab 3     No current facility-administered medications on file prior to visit.          Allergies   Allergen Reactions    Adhesive Other (comments)     Skin tears    Amoxicillin Hives         Health Maintenance:   Health Maintenance   Topic Date Due    Shingrix Vaccine Age 49> (1 of 2) 12/03/1991    Pneumococcal 65+ years (1 of 2 - PCV13) 12/03/2006    Influenza Age 5 to Adult  08/01/2019    MEDICARE YEARLY EXAM  09/08/2019    LIPID PANEL Q1  09/21/2019    HEMOGLOBIN A1C Q6M  11/14/2019    GLAUCOMA SCREENING Q2Y  06/03/2021    DTaP/Tdap/Td series (2 - Td) 08/17/2025    Bone Densitometry (Dexa) Screening  Completed       Objective:  Visit Vitals  /80 (BP 1 Location: Left arm, BP Patient Position: Sitting)   Pulse (!) 49   Temp 97.1 °F (36.2 °C) (Oral)   Resp 16   Ht 5' 1\" (1.549 m)   Wt 197 lb (89.4 kg)   SpO2 100%   BMI 37.22 kg/m²          Nurses notes and VS reviewed. Physical Examination: General appearance - alert, well appearing, and in no distress  Chest - rhonchi noted scattered  Heart - irregularly irregular rhythm with rate controlled. Labwork and Ancillary Studies:    CBC w/Diff  Lab Results   Component Value Date/Time    WBC 4.4 (L) 02/08/2019 11:40 AM    HGB 9.6 (L) 02/08/2019 11:40 AM    PLATELET 029 08/85/9683 11:40 AM         Basic Metabolic Profile  Lab Results   Component Value Date/Time    Sodium 142 02/08/2019 11:40 AM    Potassium 4.7 03/27/2019 04:22 PM    Chloride 111 (H) 02/08/2019 11:40 AM    CO2 23 02/08/2019 11:40 AM    Anion gap 8 02/08/2019 11:40 AM    Glucose 87 02/08/2019 11:40 AM    BUN 37 (H) 02/08/2019 11:40 AM    Creatinine 1.75 (H) 02/08/2019 11:40 AM    BUN/Creatinine ratio 21 (H) 02/08/2019 11:40 AM    GFR est AA 34 (L) 02/08/2019 11:40 AM    GFR est non-AA 28 (L) 02/08/2019 11:40 AM    Calcium 9.1 02/08/2019 11:40 AM         LFT  Lab Results   Component Value Date/Time    ALT (SGPT) 16 09/21/2018 10:24 AM    AST (SGOT) 17 09/21/2018 10:24 AM    Alk.  phosphatase 81 09/21/2018 10:24 AM    Bilirubin, total 0.4 09/21/2018 10:24 AM         Cholesterol  Lab Results   Component Value Date/Time    Cholesterol, total 172 09/21/2018 10:24 AM    HDL Cholesterol 75 (H) 09/21/2018 microcuries I123 sodium iodide was administered p. o. Then, thyroid   uptake measurements were performed at 4 and 24 hours.  Pinhole images of the   thyroid were obtained at 4 hours.       COMPARISON:   Ultrasound 02/21/2019       HISTORY:   ORDERING SYSTEM PROVIDED HISTORY: Hyperthyroidism   TECHNOLOGIST PROVIDED HISTORY:   Ordering Physician Provided Reason for Exam: Hyperthyroidism; Multinodular   Goiter   Acuity: Unknown   Type of Exam: Initial       FINDINGS:   Thyroid uptake at 4 hours measured 29%.  Normal range at 4-6 hours is 6-18%.     Thyroid uptake at 24 hours measured 54%.  Normal range at 24 hours is 10-35%.        There is heterogeneous distribution of radiotracer, asymmetrically increased   in the right thyroid lobe compared to the left. Josiephine Payer is a dominant nodule   in the right thyroid lobe on the recent ultrasound.           Impression   1.  Elevated 24 hour uptake of 54%.  Increased uptake corresponds to the   dominant nodule in the right thyroid lobe with heterogeneous uptake in the   left thyroid lobe.  This could represent multinodular goiter or possibly a   nodular form of Graves disease.               Past Medical History:   Diagnosis Date    Class 2 obesity with body mass index (BMI) of 38.0 to 38.9 in adult 2/13/2019    Hyperlipidemia     Hypertension     Kidney stone     Ovarian mass     Ovarian tumor     Thyroid disease     Type II or unspecified type diabetes mellitus without mention of complication, not stated as uncontrolled       Patient Active Problem List   Diagnosis    HTN (hypertension)    Mixed hyperlipidemia    Abnormal EKG    DM type 2, uncontrolled, with neuropathy ([facility] [address]    History of hyperthyroidism    Elevated alkaline phosphatase level    Multinodular goiter    Postablative hypothyroidism    Class 3 severe obesity with body mass index (BMI) of 40.0 to 44.9 in adult [facility])    Optic neuropathy, ischemic, arteritic, right     Past Surgical History: 10:24 AM    LDL, calculated 83 09/21/2018 10:24 AM    Triglyceride 70 09/21/2018 10:24 AM    CHOL/HDL Ratio 2.3 09/21/2018 10:24 AM Procedure Laterality Date    HYSTERECTOMY      OVARY REMOVAL Bilateral     SALPINGO-OOPHORECTOMY N/A 12/10/2019    ROBOTIC ASSISTED OPERATIVE LAPAROSCOPY, BILATERAL SALPINGO-OOPHORECTOMY, DERMOID CYST IN LEFT OVARY, LYSIS OF ADHESIONS performed by Honey Carty MD at [address] [place] [address] History     Socioeconomic History    Marital status: Single     Spouse name: Not on file    Number of children: Not on file    Years of education: Not on file    Highest education level: Not on file   Occupational History    Not on file   Tobacco Use    Smoking status: Never Smoker    Smokeless tobacco: Never Used   Vaping Use    Vaping Use: Never used   Substance and Sexual Activity    Alcohol use: No    Drug use: No    Sexual activity: Never   Other Topics Concern    Not on file   Social History Narrative    Not on file     Social Determinants of Health     Financial Resource Strain:     Difficulty of Paying Living Expenses:    Food Insecurity:     Worried About Running Out of Food in the Last Year:     Michelle of Food in the Last Year:    Transportation Needs:     Lack of Transportation (Medical):      Lack of Transportation (Non-Medical):    Physical Activity:     Days of Exercise per Week:     Minutes of Exercise per Session:    Stress:     Feeling of Stress :    Social Connections:     Frequency of Communication with Friends and Family:     Frequency of Social Gatherings with Friends and Family:     Attends Anab[employer]ist Services:     Active Member of Clubs or Organizations:     Attends Club or Organization Meetings:     Marital Status:    Intimate Partner Violence:     Fear of Current or Ex-Partner:     Emotionally Abused:     Physically Abused:     Sexually Abused:      Family History   Problem Relation Age of Onset    Heart Disease Mother     Diabetes Mother     Diabetes Father     COPD Father     Diabetes Maternal Aunt     Heart Disease Brother     Cancer Sister      Current Outpatient Medications   Medication Sig Dispense Refill    insulin glargine (LANTUS SOLOSTAR) 100 UNIT/ML injection pen Inject 60 Units into the skin nightly 5 pen 0    glipiZIDE (GLUCOTROL XL) 10 MG extended release tablet Take 1 tablet by mouth 2 times daily 180 tablet 1    levothyroxine (SYNTHROID) 88 MCG tablet TAKE 1 TABLET SIX DAYS A WEEK, ONE AND ONE-HALF TABLETS ONE DAY A WEEK 100 tablet 0    metFORMIN (GLUCOPHAGE XR) 500 MG extended release tablet Take 4 tablets by mouth daily (Patient taking differently: Take 2,000 mg by mouth daily 3 tabs a day.) 360 tablet 1    clopidogrel (PLAVIX) 75 MG tablet Take 75 mg by mouth daily Patient has not started this medication yet      allopurinol (ZYLOPRIM) 300 MG tablet Take 1 tablet by mouth daily      Potassium Citrate ER 15 MEQ (1620 MG) TBCR       Cholecalciferol (VITAMIN D3) 5000 units TABS Take one tab qod 30 tablet 0    furosemide (LASIX) 20 MG tablet Take 20 mg by mouth as needed Patient takes PRN      ibuprofen (ADVIL;MOTRIN) 200 MG tablet Take 200 mg by mouth every 6 hours as needed for Pain      simvastatin (ZOCOR) 20 MG tablet Take 20 mg by mouth nightly       lisinopril-hydrochlorothiazide (PRINZIDE;ZESTORETIC) 20-25 MG per tablet Take 1 tablet by mouth daily.  aspirin 81 MG tablet Take 81 mg by mouth daily. No current facility-administered medications for this visit.      No Known Allergies  Family Status   Relation Name Status    Mother      Father     Júnior Chávez  (Not Specified)    Sister  Alive    Brother  Alive    Sister         Lab Review:    Lab Results   Component Value Date    WBC 8.9 05/15/2020    HGB 11.7 05/15/2020    HCT 35.6 05/15/2020    MCV 78.2 05/15/2020     05/15/2020     Lab Results   Component Value Date     2021    K 4.1 2021    K 3.3 2019     2021    CO2 28 2021    BUN 23 2021    CREATININE 0.9 2021 urinary urgency, no nocturia  Musculoskeletal: no joint swelling, no joint stiffness, has joint pain, no muscle cramps, no muscle pain, no bone pain  Integument/Breast: no hair loss, no skin rashes, no skin lesions, no itching, no dry skin  Neurological: no numbness, no tingling, no weakness, no confusion, no headaches, no dizziness, no fainting, no tremors, no decrease in memory, no balance problems  Psychiatric: no anxiety, no depression, has insomnia  Hematologic/Lymphatic: no tendency for easy bleeding, no swollen lymph nodes, has tendency for easy bruising  Immunology: no seasonal allergies, no frequent infections, no frequent illnesses  Endocrine: no temperature intolerance    /73   Pulse 89   Ht 5' 2\" (1.575 m)   Wt 218 lb (98.9 kg)   LMP  (LMP Unknown)   SpO2 97%   BMI 39.87 kg/m²    Wt Readings from Last 3 Encounters:   06/28/21 218 lb (98.9 kg)   05/10/21 219 lb (99.3 kg)   03/08/21 216 lb (98 kg)     Body mass index is 39.87 kg/m².       OBJECTIVE:  Constitutional: no acute distress, well appearing and well nourished  Psychiatric: oriented to person, place and time, judgement and insight and normal, recent and remote memory and intact and mood and affect are normal  Skin: skin and subcutaneous tissue is normal without mass, normal turgor  Head and Face: examination of head and face revealed no abnormalities  Eyes: no lid or conjunctival swelling, erythema or discharge, pupils are normal, equal, round, reactive to light  Ears/Nose: external inspection of ears and nose revealed no abnormalities, hearing is grossly normal  Oropharynx/Mouth/Face: lips, tongue and gums are normal with no lesions, the voice quality was normal  Neck: neck is supple and symmetric, with midline trachea and no masses, thyroid is normal  Lymphatics: normal cervical lymph nodes, normal supraclavicular nodes  Pulmonary: no increased work of breathing or signs of respiratory distress, lungs are clear to Needle Aspiration:     - Non-diagnostic  - US Head Neck Soft Tissue Thyroid; Future     8. Hypothyroidism, postablative  TSH 4.7418.84.072.971.96-4.31-1.97-2.44  Continue levothyroxine 0.088 mg 1 tablet 6 days a week, one and a half tablet 1 day a week  - TSH  - FT4  - FT3     Reviewed and/or ordered clinical lab results Yes  Reviewed and/or ordered radiology tests Yes  Reviewed and/or ordered other     Reviewed and/or ordered clinical lab results Yes  Reviewed and/or ordered radiology tests Yes  Reviewed and/or ordered other diagnostic tests No  Discussed test results with performing physician No  Independently reviewed image, tracing, or specimen Yes, thyroid uptake and scan  Made a decision to obtain old records No  Reviewed and summarized old records Yes  TSH less than 0.005, PTH 30, FT4 1.41, ionized calcium 1.13, hemoglobin 11.8, hemoglobin A1c 8.3, LDL cholesterol 85, glucose 104, alkaline phosphatase 212. Obtained history from other than patient No    Shelly Marquez was counseled regarding symptoms of diabetes, elevated alk phos, hyperthyroidism diagnosis, course and complications of disease if inadequately treated, side effects of medications, diagnosis, treatment options, and prognosis, risks, benefits, complications, and alternatives of treatment, labs, imaging and other studies and treatment targets and goals, insulin, medications, hyperthyroid treatment, I-[id number] Tx, permanent hypothyroidism. She understands instructions and counseling. Return in about 3 months (around 9/28/2021) for diabetes.

## 2021-08-21 DIAGNOSIS — E89.0 POSTABLATIVE HYPOTHYROIDISM: ICD-10-CM

## 2021-08-23 NOTE — TELEPHONE ENCOUNTER
Requested Prescriptions     Pending Prescriptions Disp Refills    levothyroxine (SYNTHROID) 88 MCG tablet [Pharmacy Med Name: L-THYROXINE (SYNTHROID) TABS 88MCG] 100 tablet 3     Sig: TAKE 1 TABLET SIX DAYS A WEEK, ONE AND ONE-HALF TABLETS ONE DAY A WEEK     Last OV 6/28/21  Next OV 9/27/21   Last refill 5/21/21  Last labs 6/23/21

## 2021-08-24 RX ORDER — LEVOTHYROXINE SODIUM 88 UG/1
TABLET ORAL
Qty: 100 TABLET | Refills: 0 | Status: SHIPPED | OUTPATIENT
Start: 2021-08-24 | End: 2021-09-27

## 2021-09-15 ENCOUNTER — TELEPHONE (OUTPATIENT)
Dept: ENDOCRINOLOGY | Age: 66
End: 2021-09-15

## 2021-09-15 DIAGNOSIS — E04.2 MULTINODULAR GOITER: Primary | ICD-10-CM

## 2021-09-15 NOTE — TELEPHONE ENCOUNTER
PT requests lab orders to be placed in system and faxed Attention to Jay Tobin to [phone], Urgent needs before Friday

## 2021-09-18 ENCOUNTER — HOSPITAL ENCOUNTER (OUTPATIENT)
Age: 66
Discharge: HOME | End: 2021-09-18
Payer: COMMERCIAL

## 2021-09-18 DIAGNOSIS — E04.2 MULTINODULAR GOITER: ICD-10-CM

## 2021-09-18 LAB
A/G RATIO: 1.2 (ref 1.1–2.2)
ALBUMIN SERPL-MCNC: 3.8 G/DL (ref 3.4–5)
ALP BLD-CCNC: 151 U/L (ref 40–129)
ALT SERPL-CCNC: 9 U/L (ref 10–40)
ANION GAP SERPL CALCULATED.3IONS-SCNC: 16 MMOL/L (ref 3–16)
AST SERPL-CCNC: 26 U/L (ref 15–37)
BILIRUB SERPL-MCNC: 0.3 MG/DL (ref 0–1)
BUN BLDV-MCNC: 23 MG/DL (ref 7–20)
CALCIUM SERPL-MCNC: 9.6 MG/DL (ref 8.3–10.6)
CHLORIDE BLD-SCNC: 102 MMOL/L (ref 99–110)
CHOLESTEROL, TOTAL: 145 MG/DL (ref 0–199)
CO2: 25 MMOL/L (ref 21–32)
CREAT SERPL-MCNC: 0.9 MG/DL (ref 0.6–1.2)
GFR AFRICAN AMERICAN: >60
GFR NON-AFRICAN AMERICAN: >60
GLOBULIN: 3.2 G/DL
GLUCOSE BLD-MCNC: 82 MG/DL (ref 70–99)
HDLC SERPL-MCNC: 49 MG/DL (ref 40–60)
LDL CHOLESTEROL CALCULATED: 79 MG/DL
POTASSIUM SERPL-SCNC: 4.5 MMOL/L (ref 3.5–5.1)
SODIUM BLD-SCNC: 143 MMOL/L (ref 136–145)
T3 FREE: 2.4 PG/ML (ref 2.3–4.2)
T4 FREE: 1.4 NG/DL (ref 0.9–1.8)
TOTAL PROTEIN: 7 G/DL (ref 6.4–8.2)
TRIGL SERPL-MCNC: 85 MG/DL (ref 0–150)
TSH SERPL DL<=0.05 MIU/L-ACNC: 3.05 UIU/ML (ref 0.27–4.2)
VLDLC SERPL CALC-MCNC: 17 MG/DL

## 2021-09-18 PROCEDURE — 36415 COLL VENOUS BLD VENIPUNCTURE: CPT

## 2021-09-18 PROCEDURE — 80061 LIPID PANEL: CPT

## 2021-09-18 PROCEDURE — 84439 ASSAY OF FREE THYROXINE: CPT

## 2021-09-18 PROCEDURE — 83036 HEMOGLOBIN GLYCOSYLATED A1C: CPT

## 2021-09-18 PROCEDURE — 84443 ASSAY THYROID STIM HORMONE: CPT

## 2021-09-18 PROCEDURE — 84481 FREE ASSAY (FT-3): CPT

## 2021-09-18 PROCEDURE — 80053 COMPREHEN METABOLIC PANEL: CPT

## 2021-09-19 LAB
ESTIMATED AVERAGE GLUCOSE: 205.9 MG/DL
HBA1C MFR BLD: 8.8 %

## 2021-09-26 PROBLEM — E66.812 CLASS 2 OBESITY WITH BODY MASS INDEX (BMI) OF 39.0 TO 39.9 IN ADULT: Status: ACTIVE | Noted: 2021-09-26

## 2021-09-26 PROBLEM — E66.9 CLASS 2 OBESITY WITH BODY MASS INDEX (BMI) OF 39.0 TO 39.9 IN ADULT: Status: ACTIVE | Noted: 2021-09-26

## 2021-09-27 ENCOUNTER — OFFICE VISIT (OUTPATIENT)
Dept: ENDOCRINOLOGY | Age: 66
End: 2021-09-27
Payer: COMMERCIAL

## 2021-09-27 VITALS
DIASTOLIC BLOOD PRESSURE: 78 MMHG | HEIGHT: 62 IN | RESPIRATION RATE: 14 BRPM | BODY MASS INDEX: 40.27 KG/M2 | OXYGEN SATURATION: 98 % | HEART RATE: 75 BPM | TEMPERATURE: 98 F | SYSTOLIC BLOOD PRESSURE: 128 MMHG | WEIGHT: 218.8 LBS

## 2021-09-27 DIAGNOSIS — E04.2 MULTINODULAR GOITER: ICD-10-CM

## 2021-09-27 DIAGNOSIS — R74.8 ELEVATED ALKALINE PHOSPHATASE LEVEL: ICD-10-CM

## 2021-09-27 DIAGNOSIS — I10 ESSENTIAL HYPERTENSION: ICD-10-CM

## 2021-09-27 DIAGNOSIS — E78.2 MIXED HYPERLIPIDEMIA: ICD-10-CM

## 2021-09-27 DIAGNOSIS — E89.0 POSTABLATIVE HYPOTHYROIDISM: ICD-10-CM

## 2021-09-27 DIAGNOSIS — E66.01 CLASS 3 SEVERE OBESITY WITH SERIOUS COMORBIDITY AND BODY MASS INDEX (BMI) OF 40.0 TO 44.9 IN ADULT, UNSPECIFIED OBESITY TYPE (HCC): ICD-10-CM

## 2021-09-27 DIAGNOSIS — Z86.39 HISTORY OF HYPERTHYROIDISM: ICD-10-CM

## 2021-09-27 PROCEDURE — 99214 OFFICE O/P EST MOD 30 MIN: CPT | Performed by: INTERNAL MEDICINE

## 2021-09-27 PROCEDURE — 3052F HG A1C>EQUAL 8.0%<EQUAL 9.0%: CPT | Performed by: INTERNAL MEDICINE

## 2021-09-27 RX ORDER — DULAGLUTIDE 0.75 MG/.5ML
0.75 INJECTION, SOLUTION SUBCUTANEOUS WEEKLY
Qty: 4 PEN | Refills: 3 | Status: SHIPPED | OUTPATIENT
Start: 2021-09-27 | End: 2022-05-12

## 2021-09-27 RX ORDER — METFORMIN HYDROCHLORIDE 500 MG/1
2000 TABLET, EXTENDED RELEASE ORAL DAILY
Qty: 360 TABLET | Refills: 1 | Status: SHIPPED | OUTPATIENT
Start: 2021-09-27 | End: 2022-04-12 | Stop reason: SDUPTHER

## 2021-09-27 RX ORDER — LEVOTHYROXINE SODIUM 0.1 MG/1
100 TABLET ORAL DAILY
Qty: 90 TABLET | Refills: 1 | Status: SHIPPED | OUTPATIENT
Start: 2021-09-27 | End: 2022-01-11 | Stop reason: SDUPTHER

## 2021-09-27 NOTE — PROGRESS NOTES
Luberta Osler is a 72 y.o. female who presents for Type 2 diabetes mellitus. Current symptoms/problems include hyperglycemia and are worsening. 1.  DM type 2, uncontrolled, with neuropathy ([facility] [address] [E11.40, E11.65]     Diagnosed with Type 2 diabetes mellitus in 2012. Comorbid conditions: hyperlipidemia and Neuropathy     Current diabetic medications include: metformin  mg 4 tablets daily, glipizide XL 10 mg bid, lantus 60 units qhs     Had ovarian tumor removed and hysterectomy. Had kidney stones.     Intolerance to diabetes medications: No  Had yeast infections. Francisco J Bolivian may not be a good choice. Weight trend: stable  Prior visit with dietician: yes  Current diet: on average, 2-3 meals per day  Current exercise: walks     Current monitoring regimen: home blood tests - 2 times daily  Has brought blood glucose log/meter:  No  Home blood sugar records: fasting range:  and postprandial range: 100-140  Any episodes of hypoglycemia? Yes  Hypoglycemia frequency and time(s):  one episode in 3 months  Does patient have Glucagon emergency kit? No  Does patient have rapid acting carbohydrate?  No  Does patient wear a medic alert bracelet or necklace?  No     2. Mixed hyperlipidemia  No muscle pain.     3. Essential hypertension  No headaches.     4. Obesity  Tries to eat healthier. Lower carbs.     5. History of Hyperthyroidism      10.3 mCi 131 capsule was administered orally on on 07/12/2019.           TSH <0.005, FT4 1.41  TSH low since 2017 2.19-0.18-0.051-<005  No palpitations, anxiety, had sleep problems for a long time. No family Hx of thyroid problems.     6.  Multinodular Goiter  No thyroid cancer in family. No radiation in her neck.     7. Elevated alkaline phosphatase level  No bone pain.     8.  Hypothyroidism  Has fatigue, similar to before.     EXAMINATION:   THYROID ULTRASOUND       10/3/2020       COMPARISON:   None.       HISTORY:   ORDERING SYSTEM PROVIDED HISTORY: Multinodular goiter     FINDINGS:   Right thyroid lobe:  3.8 x 1.1 x 1.2 cm       Left thyroid lobe:  2.6 x 1 x 1.6 cm       Isthmus:  3 mm thickness       Thyroid Gland:  Thyroid gland demonstrates mildly diffusely heterogeneous   echotexture and unremarkable vascularity.       Nodules: No thyroid nodules are present.       Cervical lymphadenopathy: Patient describes palpable abnormality at the left   side of the neck corresponding to a normal appearing 7 x 9 x 13 mm lymph node   along the lateral margin of the thyroid gland.  No abnormal lymph nodes in   the imaged portions of the neck.           Impression   Unremarkable thyroid ultrasound.           Department of Pathology  FINAL CYTOLOGY REPORT  Patient Name: Radhika Vargas               Accession No:  [id number]   Age Sex:   1955    63 Y / F       Location:      [facility]  Account No:   [de-identified]                  Collected:     2019  Med Rec No:    [id number]                 Received:      2019  Attend Phys:   Salma Westbrook MD            Completed:     2019  Perform Phys: Salma Westbrook MD    FINAL DIAGNOSIS:    Thyroid, Right Nodule Fine Needle Aspiration:     - Non-diangostic    COMMENT:    Insufficient well stained/preserved follicular cells present  for analysis (<6 groups of 10).    LANA/LANA    CLINICAL DIAGNOSIS:    Diagnosis: E04.1    SPECIMEN:  THYROID, FNA: RIGHT THYROID NODULE 1.4X1.7 C.M.    GROSS DESCRIPTION:  Received are 10 smeared slides and a specimen in  cytolyt.  LANA/LANA    MICROSCOPIC DESCRIPTION: Microscopic examination performed, including a  monolayer slide and H&E stained cell block section.     CPT: [id number] X1   [id number] X1    Case signed out at [facility], [address] [place] [address]  Specimen was processed and screened at [facility],  [address] [place] [address]      EXAMINATION:   I123 Thyroid Uptake and Scan. 3/13/2019 8:10 am       TECHNIQUE:   318 microcuries I123 sodium iodide was administered p. o. Then, thyroid   uptake measurements were performed at 4 and 24 hours.  Pinhole images of the   thyroid were obtained at 4 hours.       COMPARISON:   Ultrasound 02/21/2019       HISTORY:   ORDERING SYSTEM PROVIDED HISTORY: Hyperthyroidism   TECHNOLOGIST PROVIDED HISTORY:   Ordering Physician Provided Reason for Exam: Hyperthyroidism; Multinodular   Goiter   Acuity: Unknown   Type of Exam: Initial       FINDINGS:   Thyroid uptake at 4 hours measured 29%.  Normal range at 4-6 hours is 6-18%.     Thyroid uptake at 24 hours measured 54%.  Normal range at 24 hours is 10-35%.        There is heterogeneous distribution of radiotracer, asymmetrically increased   in the right thyroid lobe compared to the left. Lynda Scrivener is a dominant nodule   in the right thyroid lobe on the recent ultrasound.           Impression   1.  Elevated 24 hour uptake of 54%.  Increased uptake corresponds to the   dominant nodule in the right thyroid lobe with heterogeneous uptake in the   left thyroid lobe.  This could represent multinodular goiter or possibly a   nodular form of Graves disease.               Past Medical History:   Diagnosis Date    Class 2 obesity with body mass index (BMI) of 38.0 to 38.9 in adult 2/13/2019    Hyperlipidemia     Hypertension     Kidney stone     Ovarian mass     Ovarian tumor     Thyroid disease     Type II or unspecified type diabetes mellitus without mention of complication, not stated as uncontrolled       Patient Active Problem List   Diagnosis    HTN (hypertension)    Mixed hyperlipidemia    Abnormal EKG    DM type 2, uncontrolled, with neuropathy ([facility] [address]    History of hyperthyroidism    Elevated alkaline phosphatase level    Multinodular goiter    Postablative hypothyroidism    Class 3 severe obesity with body mass index (BMI) of 40.0 to 44.9 in adult [facility])    Optic neuropathy, ischemic, arteritic, right    Class 2 obesity with body mass index (BMI) of 39.0 to 39.9 in adult     Past Surgical History:   Procedure Laterality Date    HYSTERECTOMY      OVARY REMOVAL Bilateral     SALPINGO-OOPHORECTOMY N/A 12/10/2019    ROBOTIC ASSISTED OPERATIVE LAPAROSCOPY, BILATERAL SALPINGO-OOPHORECTOMY, DERMOID CYST IN LEFT OVARY, LYSIS OF ADHESIONS performed by Stanislav Fuller MD at [address] [place] [address] History     Socioeconomic History    Marital status: Single     Spouse name: Not on file    Number of children: Not on file    Years of education: Not on file    Highest education level: Not on file   Occupational History    Not on file   Tobacco Use    Smoking status: Never Smoker    Smokeless tobacco: Never Used   Vaping Use    Vaping Use: Never used   Substance and Sexual Activity    Alcohol use: No    Drug use: No    Sexual activity: Never   Other Topics Concern    Not on file   Social History Narrative    Not on file     Social Determinants of Health     Financial Resource Strain:     Difficulty of Paying Living Expenses:    Food Insecurity:     Worried About Running Out of Food in the Last Year:     Michelle of Food in the Last Year:    Transportation Needs:     Lack of Transportation (Medical):      Lack of Transportation (Non-Medical):    Physical Activity:     Days of Exercise per Week:     Minutes of Exercise per Session:    Stress:     Feeling of Stress :    Social Connections:     Frequency of Communication with Friends and Family:     Frequency of Social Gatherings with Friends and Family:     Attends Worship Services:     Active Member of Clubs or Organizations:     Attends Club or Organization Meetings:     Marital Status:    Intimate Partner Violence:     Fear of Current or Ex-Partner:     Emotionally Abused:     Physically Abused:     Sexually Abused:      Family History   Problem Relation Age of Onset    Heart Disease Mother     Diabetes Mother     Diabetes Father     COPD Father    Leon Diez Diabetes Maternal Aunt     Heart Disease Brother     Cancer Sister      Current Outpatient Medications   Medication Sig Dispense Refill    metFORMIN (GLUCOPHAGE XR) 500 MG extended release tablet Take 4 tablets by mouth daily 360 tablet 1    levothyroxine (SYNTHROID) 100 MCG tablet Take 1 tablet by mouth Daily 90 tablet 1    Dulaglutide (TRULICITY) 5.89 KP/7.4VT SOPN Inject 0.75 mg into the skin once a week 4 pen 3    insulin glargine (LANTUS SOLOSTAR) 100 UNIT/ML injection pen Inject 60 Units into the skin nightly 5 pen 0    glipiZIDE (GLUCOTROL XL) 10 MG extended release tablet Take 1 tablet by mouth 2 times daily 180 tablet 1    allopurinol (ZYLOPRIM) 300 MG tablet Take 1 tablet by mouth daily      Potassium Citrate ER 15 MEQ (1620 MG) TBCR       Cholecalciferol (VITAMIN D3) 5000 units TABS Take one tab qod 30 tablet 0    furosemide (LASIX) 20 MG tablet Take 20 mg by mouth as needed Patient takes PRN      ibuprofen (ADVIL;MOTRIN) 200 MG tablet Take 200 mg by mouth every 6 hours as needed for Pain      simvastatin (ZOCOR) 20 MG tablet Take 20 mg by mouth nightly       lisinopril-hydrochlorothiazide (PRINZIDE;ZESTORETIC) 20-25 MG per tablet Take 1 tablet by mouth daily.  aspirin 81 MG tablet Take 81 mg by mouth daily.  clopidogrel (PLAVIX) 75 MG tablet Take 75 mg by mouth daily Patient has not started this medication yet (Patient not taking: Reported on 2021)       No current facility-administered medications for this visit.      No Known Allergies  Family Status   Relation Name Status    Mother      Father     Shy Santiago  (Not Specified)    Sister  Alive    Brother  Alive    Sister         Lab Review:    Lab Results   Component Value Date    WBC 8.9 05/15/2020    HGB 11.7 05/15/2020    HCT 35.6 05/15/2020    MCV 78.2 05/15/2020     05/15/2020     Lab Results   Component Value Date     2021    K 4.5 2021    K 3.3 2019     09/18/2021    CO2 25 09/18/2021    BUN 23 09/18/2021    CREATININE 0.9 09/18/2021    GLUCOSE 82 09/18/2021    CALCIUM 9.6 09/18/2021    PROT 7.0 09/18/2021    LABALBU 3.8 09/18/2021    BILITOT 0.3 09/18/2021    ALKPHOS 151 09/18/2021    AST 26 09/18/2021    ALT 9 09/18/2021    LABGLOM >60 09/18/2021    GFRAA >60 09/18/2021    AGRATIO 1.2 09/18/2021    GLOB 3.2 09/18/2021     Lab Results   Component Value Date    TSH 3.05 09/18/2021    FT3 2.4 09/18/2021     Lab Results   Component Value Date    LABA1C 8.8 09/18/2021     Lab Results   Component Value Date    .9 09/18/2021     Lab Results   Component Value Date    CHOL 145 09/18/2021     Lab Results   Component Value Date    TRIG 85 09/18/2021     Lab Results   Component Value Date    HDL 49 09/18/2021     Lab Results   Component Value Date    LDLCALC 79 09/18/2021     Lab Results   Component Value Date    LABVLDL 17 09/18/2021     No results found for: [facility]  Lab Results   Component Value Date    LABMICR <1.20 02/20/2021    LABMICR YES 12/06/2019     Lab Results   Component Value Date    VITD25 56.0 02/20/2021        Review of Systems:  Constitutional: has fatigue, no fever, no recent weight gain, has recent weight loss, has changes in appetite  Eyes: no eye pain, no change in vision, no eye redness, no eye irritation, no double vision  Ears, nose, throat: no nasal congestion, no sore throat, no earache, no decrease in hearing, no hoarseness, no dry mouth, no sinus problems, no difficulty swallowing, no neck lumps, no dental problems, no mouth sores, no ringing in ears  Pulmonary: no shortness of breath, no wheezing, no dyspnea on exertion, no cough  Cardiovascular: no chest pain, has lower extremity edema, no orthopnea, no intermittent leg claudication, no palpitations  Gastrointestinal: no abdominal pain, no nausea, no vomiting, no diarrhea, no constipation, no dysphagia, no heartburn, no bloating  Genitourinary: no dysuria, no urinary incontinence, no urinary hesitancy, no urinary frequency, no feelings of urinary urgency, no nocturia  Musculoskeletal: no joint swelling, no joint stiffness, has joint pain, no muscle cramps, no muscle pain, no bone pain  Integument/Breast: no hair loss, no skin rashes, no skin lesions, no itching, no dry skin  Neurological: no numbness, no tingling, no weakness, no confusion, no headaches, no dizziness, no fainting, no tremors, no decrease in memory, no balance problems  Psychiatric: no anxiety, no depression, has insomnia  Hematologic/Lymphatic: no tendency for easy bleeding, no swollen lymph nodes, has tendency for easy bruising  Immunology: no seasonal allergies, no frequent infections, no frequent illnesses  Endocrine: no temperature intolerance    /78   Pulse 75   Temp 98 °F (36.7 °C)   Resp 14   Ht 5' 2\" (1.575 m)   Wt 218 lb 12.8 oz (99.2 kg)   LMP  (LMP Unknown)   SpO2 98%   BMI 40.02 kg/m²    Wt Readings from Last 3 Encounters:   09/27/21 218 lb 12.8 oz (99.2 kg)   06/28/21 218 lb (98.9 kg)   05/10/21 219 lb (99.3 kg)     Body mass index is 40.02 kg/m².       OBJECTIVE:  Constitutional: no acute distress, well appearing and well nourished  Psychiatric: oriented to person, place and time, judgement and insight and normal, recent and remote memory and intact and mood and affect are normal  Skin: skin and subcutaneous tissue is normal without mass, normal turgor  Head and Face: examination of head and face revealed no abnormalities  Eyes: no lid or conjunctival swelling, erythema or discharge, pupils are normal, equal, round, reactive to light  Ears/Nose: external inspection of ears and nose revealed no abnormalities, hearing is grossly normal  Oropharynx/Mouth/Face: lips, tongue and gums are normal with no lesions, the voice quality was normal  Neck: neck is supple and symmetric, with midline trachea and no masses, thyroid is normal  Lymphatics: normal cervical lymph nodes, normal supraclavicular nodes  Pulmonary: no increased work of breathing or signs of respiratory distress, lungs are clear to auscultation  Cardiovascular: normal heart rate and rhythm, normal S1 and S2, no murmurs and pedal pulses and 2+ bilaterally, 1+ edema  Abdomen: abdomen is soft, non-tender with no masses  Musculoskeletal: normal gait and station and exam of the digits and nails are normal  Neurological: normal coordination and normal general cortical function, has mild hand tremor    ASSESSMENT/PLAN:  1. DM type 2, uncontrolled, with neuropathy (HCC)  Does diet and exercise  Hemoglobin A1c 8.7-7.8-7.27.17.28.3-8.6-8.58.78.8  Uncontrolled. Metformin  mg 2 tabs bid  Glipizide 10 mg ER bid  Lantus 60 units qhs  Start Trulicity 2.13 mg weekly  - Comprehensive Metabolic Panel; Future  - Hemoglobin A1C; Future  - Microalbumin / Creatinine Urine Ratio; Future  Having Urology evaluation     2. Mixed hyperlipidemia  LDL 5481897784-42-5939  - Lipid Panel; Future     3. Essential hypertension  Continue current treatment     4. Obesity  Diet, exercise.     5. History of Hyperthyroidism  Had I-131 Tx.  TSH receptor antibody and TSI positive. Also positive TPO antibody. 10.3 mCi 131 capsule was administered orally on on 07/12/2019.       6. Elevated alkaline phosphatase level  Normal PTH, normal calcium. Alkaline phosphatase, elevated - liver fraction. Follow with PCP  Alk phos [phone]  Calcium 9.6  - Alkaline Phosphatase, Isoenzymes; Future  - PTH, Intact; Future  - Vitamin D 25 Hydroxy; Future  - Phosphorus; Future  Vitamin D to 5000 IU qod.  25 hydroxy vitamin D 5350-56     7.  Multinodular Goiter  10/2020 thyroid nodule not seen  Repeated FNA 6/2019 by Dr. Stefani Ybarra. Right 1.7 cm nodule nondiagnostic  Thyroid, Right Nodule Fine Needle Aspiration:     - Non-diagnostic  - US Head Neck Soft Tissue Thyroid; Future     8.  Hypothyroidism, postablative  Increase levothyroxine to 0.1 mg daily  TSH 4.7418.84.072.971.96-4.31-1.97-2.443.05  TSH  - T3, Free; Future  - T4, Free; Future  Reviewed and/or ordered clinical lab results Yes  Reviewed and/or ordered radiology tests Yes  Reviewed and/or ordered other     Reviewed and/or ordered clinical lab results Yes  Reviewed and/or ordered radiology tests Yes  Reviewed and/or ordered other diagnostic tests No  Discussed test results with performing physician No  Independently reviewed image, tracing, or specimen Yes, thyroid uptake and scan  Made a decision to obtain old records No  Reviewed and summarized old records Yes  TSH less than 0.005, PTH 30, FT4 1.41, ionized calcium 1.13, hemoglobin 11.8, hemoglobin A1c 8.3, LDL cholesterol 85, glucose 104, alkaline phosphatase 212. Obtained history from other than patient No    Harmony Ramos was counseled regarding symptoms of diabetes, elevated alk phos, hyperthyroidism diagnosis, course and complications of disease if inadequately treated, side effects of medications, diagnosis, treatment options, and prognosis, risks, benefits, complications, and alternatives of treatment, labs, imaging and other studies and treatment targets and goals, insulin, medications, hyperthyroid treatment, I-131 Tx, permanent hypothyroidism. She understands instructions and counseling. Return in about 3 months (around 12/27/2021) for diabetes.

## 2021-12-28 ENCOUNTER — HOSPITAL ENCOUNTER (OUTPATIENT)
Dept: ULTRASOUND IMAGING | Age: 66
Discharge: HOME | End: 2021-12-28
Payer: COMMERCIAL

## 2021-12-28 ENCOUNTER — HOSPITAL ENCOUNTER (OUTPATIENT)
Age: 66
Discharge: HOME | End: 2021-12-28
Payer: COMMERCIAL

## 2021-12-28 DIAGNOSIS — N20.0 CALCULUS OF KIDNEY: ICD-10-CM

## 2021-12-28 LAB
A/G RATIO: 1.2 (ref 1.1–2.2)
ALBUMIN SERPL-MCNC: 4 G/DL (ref 3.4–5)
ALP BLD-CCNC: 164 U/L (ref 40–129)
ALT SERPL-CCNC: 11 U/L (ref 10–40)
ANION GAP SERPL CALCULATED.3IONS-SCNC: 13 MMOL/L (ref 3–16)
AST SERPL-CCNC: 27 U/L (ref 15–37)
BILIRUB SERPL-MCNC: 0.3 MG/DL (ref 0–1)
BUN BLDV-MCNC: 26 MG/DL (ref 7–20)
CALCIUM SERPL-MCNC: 9.8 MG/DL (ref 8.3–10.6)
CHLORIDE BLD-SCNC: 101 MMOL/L (ref 99–110)
CHOLESTEROL, TOTAL: 151 MG/DL (ref 0–199)
CO2: 28 MMOL/L (ref 21–32)
CREAT SERPL-MCNC: 1 MG/DL (ref 0.6–1.2)
GFR AFRICAN AMERICAN: >60
GFR NON-AFRICAN AMERICAN: 55
GLUCOSE BLD-MCNC: 87 MG/DL (ref 70–99)
HDLC SERPL-MCNC: 54 MG/DL (ref 40–60)
LDL CHOLESTEROL CALCULATED: 76 MG/DL
POTASSIUM SERPL-SCNC: 5 MMOL/L (ref 3.5–5.1)
SODIUM BLD-SCNC: 142 MMOL/L (ref 136–145)
T4 FREE: 1.5 NG/DL (ref 0.9–1.8)
TOTAL PROTEIN: 7.3 G/DL (ref 6.4–8.2)
TRIGL SERPL-MCNC: 106 MG/DL (ref 0–150)
TSH SERPL DL<=0.05 MIU/L-ACNC: 2.21 UIU/ML (ref 0.27–4.2)
VLDLC SERPL CALC-MCNC: 21 MG/DL

## 2021-12-28 PROCEDURE — 76770 US EXAM ABDO BACK WALL COMP: CPT

## 2021-12-28 PROCEDURE — 84439 ASSAY OF FREE THYROXINE: CPT

## 2021-12-28 PROCEDURE — 80053 COMPREHEN METABOLIC PANEL: CPT

## 2021-12-28 PROCEDURE — 80061 LIPID PANEL: CPT

## 2021-12-28 PROCEDURE — 84443 ASSAY THYROID STIM HORMONE: CPT

## 2021-12-28 PROCEDURE — 83036 HEMOGLOBIN GLYCOSYLATED A1C: CPT

## 2021-12-28 PROCEDURE — 36415 COLL VENOUS BLD VENIPUNCTURE: CPT

## 2021-12-29 LAB
ESTIMATED AVERAGE GLUCOSE: 203 MG/DL
HBA1C MFR BLD: 8.7 %

## 2022-01-11 ENCOUNTER — OFFICE VISIT (OUTPATIENT)
Dept: ENDOCRINOLOGY | Age: 67
End: 2022-01-11
Payer: COMMERCIAL

## 2022-01-11 VITALS
BODY MASS INDEX: 39.93 KG/M2 | TEMPERATURE: 98 F | RESPIRATION RATE: 14 BRPM | WEIGHT: 217 LBS | DIASTOLIC BLOOD PRESSURE: 64 MMHG | HEIGHT: 62 IN | OXYGEN SATURATION: 99 % | SYSTOLIC BLOOD PRESSURE: 118 MMHG | HEART RATE: 71 BPM

## 2022-01-11 DIAGNOSIS — E04.2 MULTINODULAR GOITER: ICD-10-CM

## 2022-01-11 DIAGNOSIS — E78.2 MIXED HYPERLIPIDEMIA: ICD-10-CM

## 2022-01-11 DIAGNOSIS — I10 PRIMARY HYPERTENSION: ICD-10-CM

## 2022-01-11 DIAGNOSIS — R74.8 ELEVATED ALKALINE PHOSPHATASE LEVEL: ICD-10-CM

## 2022-01-11 DIAGNOSIS — E89.0 POSTABLATIVE HYPOTHYROIDISM: ICD-10-CM

## 2022-01-11 DIAGNOSIS — Z86.39 HISTORY OF HYPERTHYROIDISM: ICD-10-CM

## 2022-01-11 PROCEDURE — 99214 OFFICE O/P EST MOD 30 MIN: CPT | Performed by: INTERNAL MEDICINE

## 2022-01-11 RX ORDER — LEVOTHYROXINE SODIUM 0.1 MG/1
100 TABLET ORAL DAILY
Qty: 90 TABLET | Refills: 1 | Status: SHIPPED | OUTPATIENT
Start: 2022-01-11 | End: 2022-04-12 | Stop reason: SDUPTHER

## 2022-01-11 RX ORDER — GLIPIZIDE 10 MG/1
10 TABLET, FILM COATED, EXTENDED RELEASE ORAL 2 TIMES DAILY
Qty: 180 TABLET | Refills: 1 | Status: SHIPPED | OUTPATIENT
Start: 2022-01-11 | End: 2022-04-12 | Stop reason: SDUPTHER

## 2022-01-11 NOTE — PROGRESS NOTES
Charma Romberg is a 77 y.o. female who presents for Type 2 diabetes mellitus. Current symptoms/problems include hyperglycemia and are worsening. 1.  DM type 2, uncontrolled, with neuropathy ([facility] [address] [E11.40, E11.65]     Diagnosed with Type 2 diabetes mellitus in 2012. Comorbid conditions: hyperlipidemia and Neuropathy     Current diabetic medications include: metformin  mg 4 tablets daily, glipizide XL 10 mg bid, lantus 60 units qhs     Had ovarian tumor removed and hysterectomy. Had kidney stones.     Intolerance to diabetes medications: No  Had yeast infections. Beverely Bellaire may not be a good choice. Weight trend: stable  Prior visit with dietician: yes  Current diet: on average, 2-3 meals per day  Current exercise: walks     Current monitoring regimen: home blood tests - 2 times daily  Has brought blood glucose log/meter:  No  Home blood sugar records: fasting range:  and postprandial range: 100-140  Any episodes of hypoglycemia? Yes  Hypoglycemia frequency and time(s):  one episode in 3 months  Does patient have Glucagon emergency kit? No  Does patient have rapid acting carbohydrate?  No  Does patient wear a medic alert bracelet or necklace?  No     2. Mixed hyperlipidemia  No muscle pain.     3. Essential hypertension  No headaches.     4. Obesity  Tries to eat healthier. Lower carbs.     5. History of Hyperthyroidism      10.3 mCi 131 capsule was administered orally on on 07/12/2019.           TSH <0.005, FT4 1.41  TSH low since 2017 2.19-0.18-0.051-<005  No palpitations, anxiety, had sleep problems for a long time. No family Hx of thyroid problems.     6.  Multinodular Goiter  No thyroid cancer in family. No radiation in her neck.     7. Elevated alkaline phosphatase level  No bone pain.     8.  Hypothyroidism  Has fatigue, similar to before.     EXAMINATION:   THYROID ULTRASOUND       10/3/2020       COMPARISON:   None.       HISTORY:   ORDERING SYSTEM PROVIDED HISTORY: Multinodular goiter     FINDINGS:   Right thyroid lobe:  3.8 x 1.1 x 1.2 cm       Left thyroid lobe:  2.6 x 1 x 1.6 cm       Isthmus:  3 mm thickness       Thyroid Gland:  Thyroid gland demonstrates mildly diffusely heterogeneous   echotexture and unremarkable vascularity.       Nodules: No thyroid nodules are present.       Cervical lymphadenopathy: Patient describes palpable abnormality at the left   side of the neck corresponding to a normal appearing 7 x 9 x 13 mm lymph node   along the lateral margin of the thyroid gland.  No abnormal lymph nodes in   the imaged portions of the neck.           Impression   Unremarkable thyroid ultrasound.           Department of Pathology  FINAL CYTOLOGY REPORT  Patient Name: Hali Manuel               Accession No:  [id number]   Age Sex:   1955    63 Y / F       Location:      [facility]  Account No:   [de-identified]                  Collected:     2019  Med Rec No:    [id number]                 Received:      2019  Attend Phys:   Mally Boone MD            Completed:     2019  Perform Phys: Mally Boone MD    FINAL DIAGNOSIS:    Thyroid, Right Nodule Fine Needle Aspiration:     - Non-diangostic    COMMENT:    Insufficient well stained/preserved follicular cells present  for analysis (<6 groups of 10).    LANA/LANA    CLINICAL DIAGNOSIS:    Diagnosis: E04.1    SPECIMEN:  THYROID, FNA: RIGHT THYROID NODULE 1.4X1.7 C.M.    GROSS DESCRIPTION:  Received are 10 smeared slides and a specimen in  cytolyt.  LANA/LANA    MICROSCOPIC DESCRIPTION: Microscopic examination performed, including a  monolayer slide and H&E stained cell block section.     CPT: [id number] X1   [id number] X1    Case signed out at [facility], [address] [place] [address]  Specimen was processed and screened at [facility],  [address] [place] [address]      EXAMINATION:   I123 Thyroid Uptake and Scan. 3/13/2019 8:10 am       TECHNIQUE:   318 microcuries I123 sodium iodide was administered p. o. Then, thyroid   uptake measurements were performed at 4 and 24 hours.  Pinhole images of the   thyroid were obtained at 4 hours.       COMPARISON:   Ultrasound 02/21/2019       HISTORY:   ORDERING SYSTEM PROVIDED HISTORY: Hyperthyroidism   TECHNOLOGIST PROVIDED HISTORY:   Ordering Physician Provided Reason for Exam: Hyperthyroidism; Multinodular   Goiter   Acuity: Unknown   Type of Exam: Initial       FINDINGS:   Thyroid uptake at 4 hours measured 29%.  Normal range at 4-6 hours is 6-18%.     Thyroid uptake at 24 hours measured 54%.  Normal range at 24 hours is 10-35%.        There is heterogeneous distribution of radiotracer, asymmetrically increased   in the right thyroid lobe compared to the left. Rohini Nair is a dominant nodule   in the right thyroid lobe on the recent ultrasound.           Impression   1.  Elevated 24 hour uptake of 54%.  Increased uptake corresponds to the   dominant nodule in the right thyroid lobe with heterogeneous uptake in the   left thyroid lobe.  This could represent multinodular goiter or possibly a   nodular form of Graves disease.               Past Medical History:   Diagnosis Date    Class 2 obesity with body mass index (BMI) of 38.0 to 38.9 in adult 2/13/2019    Hyperlipidemia     Hypertension     Kidney stone     Ovarian mass     Ovarian tumor     Thyroid disease     Type II or unspecified type diabetes mellitus without mention of complication, not stated as uncontrolled       Patient Active Problem List   Diagnosis    HTN (hypertension)    Mixed hyperlipidemia    Abnormal EKG    DM type 2, uncontrolled, with neuropathy ([facility] [address]    History of hyperthyroidism    Elevated alkaline phosphatase level    Multinodular goiter    Postablative hypothyroidism    Class 3 severe obesity with body mass index (BMI) of 40.0 to 44.9 in adult [facility])    Optic neuropathy, ischemic, arteritic, right    Class 2 obesity with body mass index (BMI) of 39.0 to 39.9 in adult     Past Surgical History:   Procedure Laterality Date    HYSTERECTOMY      OVARY REMOVAL Bilateral     SALPINGO-OOPHORECTOMY N/A 12/10/2019    ROBOTIC ASSISTED OPERATIVE LAPAROSCOPY, BILATERAL SALPINGO-OOPHORECTOMY, DERMOID CYST IN LEFT OVARY, LYSIS OF ADHESIONS performed by Richa Elam MD at [address] [place] [address] History     Socioeconomic History    Marital status: Single     Spouse name: Not on file    Number of children: Not on file    Years of education: Not on file    Highest education level: Not on file   Occupational History    Not on file   Tobacco Use    Smoking status: Never Smoker    Smokeless tobacco: Never Used   Vaping Use    Vaping Use: Never used   Substance and Sexual Activity    Alcohol use: No    Drug use: No    Sexual activity: Never   Other Topics Concern    Not on file   Social History Narrative    Not on file     Social Determinants of Health     Financial Resource Strain:     Difficulty of Paying Living Expenses: Not on file   Food Insecurity:     Worried About Running Out of Food in the Last Year: Not on file    Michelle of Food in the Last Year: Not on file   Transportation Needs:     Lack of Transportation (Medical): Not on file    Lack of Transportation (Non-Medical):  Not on file   Physical Activity:     Days of Exercise per Week: Not on file    Minutes of Exercise per Session: Not on file   Stress:     Feeling of Stress : Not on file   Social Connections:     Frequency of Communication with Friends and Family: Not on file    Frequency of Social Gatherings with Friends and Family: Not on file    Attends Sabianist Services: Not on file    Active Member of Clubs or Organizations: Not on file    Attends Club or Organization Meetings: Not on file    Marital Status: Not on file   Intimate Partner Violence:     Fear of Current or Ex-Partner: Not on file    Emotionally Abused: Not on file   [facility] Physically Abused: Not on file    Sexually Abused: Not on file   Housing Stability:     Unable to Pay for Housing in the Last Year: Not on file    Number of Places Lived in the Last Year: Not on file    Unstable Housing in the Last Year: Not on file     Family History   Problem Relation Age of Onset    Heart Disease Mother     Diabetes Mother     Diabetes Father     COPD Father     Diabetes Maternal Aunt     Heart Disease Brother     Cancer Sister      Current Outpatient Medications   Medication Sig Dispense Refill    glipiZIDE (GLUCOTROL XL) 10 MG extended release tablet Take 1 tablet by mouth 2 times daily 180 tablet 1    levothyroxine (SYNTHROID) 100 MCG tablet Take 1 tablet by mouth Daily 90 tablet 1    metFORMIN (GLUCOPHAGE XR) 500 MG extended release tablet Take 4 tablets by mouth daily 360 tablet 1    insulin glargine (LANTUS SOLOSTAR) 100 UNIT/ML injection pen Inject 60 Units into the skin nightly 5 pen 0    allopurinol (ZYLOPRIM) 300 MG tablet Take 1 tablet by mouth daily      Potassium Citrate ER 15 MEQ (1620 MG) TBCR       Cholecalciferol (VITAMIN D3) 5000 units TABS Take one tab qod 30 tablet 0    furosemide (LASIX) 20 MG tablet Take 20 mg by mouth as needed Patient takes PRN      ibuprofen (ADVIL;MOTRIN) 200 MG tablet Take 200 mg by mouth every 6 hours as needed for Pain      simvastatin (ZOCOR) 20 MG tablet Take 20 mg by mouth nightly       lisinopril-hydrochlorothiazide (PRINZIDE;ZESTORETIC) 20-25 MG per tablet Take 1 tablet by mouth daily.  aspirin 81 MG tablet Take 81 mg by mouth daily.  Dulaglutide (TRULICITY) 2.63 ZJ/0.7QI SOPN Inject 0.75 mg into the skin once a week (Patient not taking: Reported on 1/11/2022) 4 pen 3    clopidogrel (PLAVIX) 75 MG tablet Take 75 mg by mouth daily Patient has not started this medication yet (Patient not taking: Reported on 9/27/2021)       No current facility-administered medications for this visit.      No Known Allergies  Family Status   Relation Name Status    Mother      Father     [facility]  (Not Specified)    Sister  Alive    Brother  [de-identified]    Sister         Lab Review:    Lab Results   Component Value Date    WBC 8.9 05/15/2020    HGB 11.7 05/15/2020    HCT 35.6 05/15/2020    MCV 78.2 05/15/2020     05/15/2020     Lab Results   Component Value Date     2021    K 5.0 2021    K 3.3 2019     2021    CO2 28 2021    BUN 26 2021    CREATININE 1.0 2021    GLUCOSE 87 2021    CALCIUM 9.8 2021    PROT 7.3 2021    LABALBU 4.0 2021    BILITOT 0.3 2021    ALKPHOS 164 2021    AST 27 2021    ALT 11 2021    LABGLOM 55 2021    GFRAA >60 2021    AGRATIO 1.2 2021    GLOB 3.2 2021     Lab Results   Component Value Date    TSH 2.21 2021    FT3 2.4 2021     Lab Results   Component Value Date    LABA1C 8.7 2021     Lab Results   Component Value Date    .0 2021     Lab Results   Component Value Date    CHOL 151 2021     Lab Results   Component Value Date    TRIG 106 2021     Lab Results   Component Value Date    HDL 54 2021     Lab Results   Component Value Date    LDLCALC 76 2021     Lab Results   Component Value Date    LABVLDL 21 2021     No results found for: [facility]  Lab Results   Component Value Date    LABMICR <1.20 2021    LABMICR YES 2019     Lab Results   Component Value Date    VITD25 56.0 2021        Review of Systems:  Constitutional: has fatigue, no fever, no recent weight gain, has recent weight loss, has changes in appetite  Eyes: no eye pain, no change in vision, no eye redness, no eye irritation, no double vision  Ears, nose, throat: no nasal congestion, no sore throat, no earache, no decrease in hearing, no hoarseness, no dry mouth, no sinus problems, no difficulty swallowing, no neck lumps, no dental problems, no mouth sores, no ringing in ears  Pulmonary: no shortness of breath, no wheezing, no dyspnea on exertion, no cough  Cardiovascular: no chest pain, has lower extremity edema, no orthopnea, no intermittent leg claudication, no palpitations  Gastrointestinal: no abdominal pain, no nausea, no vomiting, no diarrhea, no constipation, no dysphagia, no heartburn, no bloating  Genitourinary: no dysuria, no urinary incontinence, no urinary hesitancy, no urinary frequency, no feelings of urinary urgency, no nocturia  Musculoskeletal: no joint swelling, no joint stiffness, has joint pain, no muscle cramps, no muscle pain, no bone pain  Integument/Breast: no hair loss, no skin rashes, no skin lesions, no itching, no dry skin  Neurological: no numbness, no tingling, no weakness, no confusion, no headaches, no dizziness, no fainting, no tremors, no decrease in memory, no balance problems  Psychiatric: no anxiety, no depression, has insomnia  Hematologic/Lymphatic: no tendency for easy bleeding, no swollen lymph nodes, has tendency for easy bruising  Immunology: no seasonal allergies, no frequent infections, no frequent illnesses  Endocrine: no temperature intolerance    /64   Pulse 71   Temp 98 °F (36.7 °C)   Resp 14   Ht 5' 2\" (1.575 m)   Wt 217 lb (98.4 kg)   LMP  (LMP Unknown)   SpO2 99%   BMI 39.69 kg/m²    Wt Readings from Last 3 Encounters:   01/11/22 217 lb (98.4 kg)   09/27/21 218 lb 12.8 oz (99.2 kg)   06/28/21 218 lb (98.9 kg)     Body mass index is 39.69 kg/m².       OBJECTIVE:  Constitutional: no acute distress, well appearing and well nourished  Psychiatric: oriented to person, place and time, judgement and insight and normal, recent and remote memory and intact and mood and affect are normal  Skin: skin and subcutaneous tissue is normal without mass, normal turgor  Head and Face: examination of head and face revealed no abnormalities  Eyes: no lid or conjunctival swelling, erythema or discharge, pupils are normal, equal, round, reactive to light  Ears/Nose: external inspection of ears and nose revealed no abnormalities, hearing is grossly normal  Oropharynx/Mouth/Face: lips, tongue and gums are normal with no lesions, the voice quality was normal  Neck: neck is supple and symmetric, with midline trachea and no masses, thyroid is normal  Lymphatics: normal cervical lymph nodes, normal supraclavicular nodes  Pulmonary: no increased work of breathing or signs of respiratory distress, lungs are clear to auscultation  Cardiovascular: normal heart rate and rhythm, normal S1 and S2, no murmurs and pedal pulses and 2+ bilaterally, 1+ edema  Abdomen: abdomen is soft, non-tender with no masses  Musculoskeletal: normal gait and station and exam of the digits and nails are normal  Neurological: normal coordination and normal general cortical function, has mild hand tremor    Visual inspection:  Deformity/amputation: absent  Skin lesions/pre-ulcerative calluses: present calluses  Edema: right- negative, left- negative     Sensory exam:  Monofilament sensation: normal  (minimum of 5 random plantar locations tested, avoiding callused areas - > 1 area with absence of sensation is + for neuropathy)     Plus at least one of the following:  Pulses: normal  Proprioception: Intact  Vibration (128 Hz): Impaired    ASSESSMENT/PLAN:  1. DM type 2, uncontrolled, with neuropathy (HCC)  Does diet and exercise  Hemoglobin A1c 8.7-7.8-7.27.17.28.3-8.6-8.58.78.88.7  Uncontrolled. Metformin  mg 2 tabs bid  Glipizide 10 mg ER bid  Lantus 60 units qhs  Start Trulicity 7.29 mg weekly, never started yet  - Comprehensive Metabolic Panel; Future  - Hemoglobin A1C; Future  - Microalbumin / Creatinine Urine Ratio; Future  Having Urology evaluation     2. Mixed hyperlipidemia  LDL 548189777976  - Lipid Panel; Future     3. Essential hypertension  Continue current treatment     4.  Obesity  Diet, exercise.     5. History of Hyperthyroidism  Had I-131 Tx.  TSH receptor antibody and TSI positive. Also positive TPO antibody. 10.3 mCi 131 capsule was administered orally on on 07/12/2019.       6. Elevated alkaline phosphatase level  Normal PTH, normal calcium. Alkaline phosphatase, elevated - liver fraction. Follow with PCP  Alk phos [phone]  Calcium 9.6  - Alkaline Phosphatase, Isoenzymes; Future  - PTH, Intact; Future  - Vitamin D 25 Hydroxy; Future  - Phosphorus; Future  Vitamin D to 5000 IU qod.  25 hydroxy vitamin D 5350-56     7.  Multinodular Goiter  10/2020 thyroid nodule not seen  Repeated FNA 6/2019 by Dr. Rakesh Root. Right 1.7 cm nodule nondiagnostic  Thyroid, Right Nodule Fine Needle Aspiration:     - Non-diagnostic  - US Head Neck Soft Tissue Thyroid; Future     8. Hypothyroidism, postablative  Continue levothyroxine 0.1 mg daily  TSH 4.7418.84.072.971.96-4.31-1.97-2.443.052.21  TSH  - T3, Free; Future  - T4, Free; Future  Reviewed and/or ordered clinical lab results Yes  Reviewed and/or ordered radiology tests Yes  Reviewed and/or ordered other     Reviewed and/or ordered clinical lab results Yes  Reviewed and/or ordered radiology tests Yes  Reviewed and/or ordered other diagnostic tests No  Discussed test results with performing physician No  Independently reviewed image, tracing, or specimen Yes, thyroid uptake and scan  Made a decision to obtain old records No  Reviewed and summarized old records Yes  TSH less than 0.005, PTH 30, FT4 1.41, ionized calcium 1.13, hemoglobin 11.8, hemoglobin A1c 8.3, LDL cholesterol 85, glucose 104, alkaline phosphatase 212.   Obtained history from other than patient No    Gino Reaves was counseled regarding symptoms of diabetes, elevated alk phos, hyperthyroidism diagnosis, course and complications of disease if inadequately treated, side effects of medications, diagnosis, treatment options, and prognosis, risks, benefits, complications, and alternatives of treatment, labs, imaging and other studies and treatment targets and goals, insulin, medications, hyperthyroid treatment, I-131 Tx, permanent hypothyroidism. She understands instructions and counseling. Return in about 3 months (around 4/11/2022) for diabetes.

## 2022-04-05 ENCOUNTER — HOSPITAL ENCOUNTER (OUTPATIENT)
Age: 67
Discharge: HOME | End: 2022-04-05
Payer: COMMERCIAL

## 2022-04-05 DIAGNOSIS — Z86.39 HISTORY OF HYPERTHYROIDISM: ICD-10-CM

## 2022-04-05 DIAGNOSIS — I10 PRIMARY HYPERTENSION: ICD-10-CM

## 2022-04-05 DIAGNOSIS — E89.0 POSTABLATIVE HYPOTHYROIDISM: ICD-10-CM

## 2022-04-05 DIAGNOSIS — R74.8 ELEVATED ALKALINE PHOSPHATASE LEVEL: ICD-10-CM

## 2022-04-05 DIAGNOSIS — E78.2 MIXED HYPERLIPIDEMIA: ICD-10-CM

## 2022-04-05 DIAGNOSIS — E04.2 MULTINODULAR GOITER: ICD-10-CM

## 2022-04-05 LAB
A/G RATIO: 1.7 (ref 1.1–2.2)
ALBUMIN SERPL-MCNC: 4 G/DL (ref 3.4–5)
ALP BLD-CCNC: 138 U/L (ref 40–129)
ALT SERPL-CCNC: 8 U/L (ref 10–40)
ANION GAP SERPL CALCULATED.3IONS-SCNC: 15 MMOL/L (ref 3–16)
AST SERPL-CCNC: 20 U/L (ref 15–37)
BILIRUB SERPL-MCNC: 0.3 MG/DL (ref 0–1)
BUN BLDV-MCNC: 21 MG/DL (ref 7–20)
CALCIUM SERPL-MCNC: 8.9 MG/DL (ref 8.3–10.6)
CHLORIDE BLD-SCNC: 99 MMOL/L (ref 99–110)
CHOLESTEROL, TOTAL: 176 MG/DL (ref 0–199)
CO2: 27 MMOL/L (ref 21–32)
CREAT SERPL-MCNC: 0.9 MG/DL (ref 0.6–1.2)
CREATININE URINE: 125.1 MG/DL (ref 28–259)
ESTIMATED AVERAGE GLUCOSE: 197.3 MG/DL
GFR AFRICAN AMERICAN: >60
GFR NON-AFRICAN AMERICAN: >60
GLUCOSE BLD-MCNC: 71 MG/DL (ref 70–99)
HBA1C MFR BLD: 8.5 %
HDLC SERPL-MCNC: 57 MG/DL (ref 40–60)
LDL CHOLESTEROL CALCULATED: 101 MG/DL
MICROALBUMIN UR-MCNC: 3.1 MG/DL
MICROALBUMIN/CREAT UR-RTO: 24.8 MG/G (ref 0–30)
POTASSIUM SERPL-SCNC: 4.2 MMOL/L (ref 3.5–5.1)
SODIUM BLD-SCNC: 141 MMOL/L (ref 136–145)
T3 FREE: 2.5 PG/ML (ref 2.3–4.2)
T4 FREE: 1.4 NG/DL (ref 0.9–1.8)
TOTAL PROTEIN: 6.4 G/DL (ref 6.4–8.2)
TRIGL SERPL-MCNC: 90 MG/DL (ref 0–150)
TSH SERPL DL<=0.05 MIU/L-ACNC: 3.55 UIU/ML (ref 0.27–4.2)
VITAMIN D 25-HYDROXY: 55.6 NG/ML
VLDLC SERPL CALC-MCNC: 18 MG/DL

## 2022-04-05 PROCEDURE — 82570 ASSAY OF URINE CREATININE: CPT

## 2022-04-05 PROCEDURE — 84481 FREE ASSAY (FT-3): CPT

## 2022-04-05 PROCEDURE — 80053 COMPREHEN METABOLIC PANEL: CPT

## 2022-04-05 PROCEDURE — 83036 HEMOGLOBIN GLYCOSYLATED A1C: CPT

## 2022-04-05 PROCEDURE — 82306 VITAMIN D 25 HYDROXY: CPT

## 2022-04-05 PROCEDURE — 80061 LIPID PANEL: CPT

## 2022-04-05 PROCEDURE — 84439 ASSAY OF FREE THYROXINE: CPT

## 2022-04-05 PROCEDURE — 84443 ASSAY THYROID STIM HORMONE: CPT

## 2022-04-05 PROCEDURE — 82043 UR ALBUMIN QUANTITATIVE: CPT

## 2022-04-05 PROCEDURE — 36415 COLL VENOUS BLD VENIPUNCTURE: CPT

## 2022-04-12 ENCOUNTER — OFFICE VISIT (OUTPATIENT)
Dept: ENDOCRINOLOGY | Age: 67
End: 2022-04-12
Payer: COMMERCIAL

## 2022-04-12 VITALS
RESPIRATION RATE: 14 BRPM | BODY MASS INDEX: 40.3 KG/M2 | DIASTOLIC BLOOD PRESSURE: 68 MMHG | HEIGHT: 62 IN | SYSTOLIC BLOOD PRESSURE: 130 MMHG | HEART RATE: 65 BPM | TEMPERATURE: 98 F | WEIGHT: 219 LBS | OXYGEN SATURATION: 98 %

## 2022-04-12 DIAGNOSIS — E04.2 MULTINODULAR GOITER: ICD-10-CM

## 2022-04-12 DIAGNOSIS — E89.0 POSTABLATIVE HYPOTHYROIDISM: ICD-10-CM

## 2022-04-12 DIAGNOSIS — E78.2 MIXED HYPERLIPIDEMIA: ICD-10-CM

## 2022-04-12 DIAGNOSIS — Z86.39 HISTORY OF HYPERTHYROIDISM: ICD-10-CM

## 2022-04-12 DIAGNOSIS — E66.01 CLASS 2 SEVERE OBESITY WITH SERIOUS COMORBIDITY AND BODY MASS INDEX (BMI) OF 39.0 TO 39.9 IN ADULT, UNSPECIFIED OBESITY TYPE (HCC): ICD-10-CM

## 2022-04-12 DIAGNOSIS — I10 PRIMARY HYPERTENSION: ICD-10-CM

## 2022-04-12 DIAGNOSIS — R74.8 ELEVATED ALKALINE PHOSPHATASE LEVEL: ICD-10-CM

## 2022-04-12 PROCEDURE — 99214 OFFICE O/P EST MOD 30 MIN: CPT | Performed by: INTERNAL MEDICINE

## 2022-04-12 PROCEDURE — 3052F HG A1C>EQUAL 8.0%<EQUAL 9.0%: CPT | Performed by: INTERNAL MEDICINE

## 2022-04-12 RX ORDER — INSULIN GLARGINE 100 [IU]/ML
60 INJECTION, SOLUTION SUBCUTANEOUS NIGHTLY
Qty: 30 PEN | Refills: 1
Start: 2022-04-12 | End: 2022-08-23

## 2022-04-12 RX ORDER — METFORMIN HYDROCHLORIDE 500 MG/1
2000 TABLET, EXTENDED RELEASE ORAL DAILY
Qty: 360 TABLET | Refills: 1 | Status: SHIPPED | OUTPATIENT
Start: 2022-04-12 | End: 2022-08-23 | Stop reason: SDUPTHER

## 2022-04-12 RX ORDER — LEVOTHYROXINE SODIUM 0.1 MG/1
100 TABLET ORAL DAILY
Qty: 90 TABLET | Refills: 1 | Status: SHIPPED | OUTPATIENT
Start: 2022-04-12 | End: 2022-10-10

## 2022-04-12 RX ORDER — GLIPIZIDE 10 MG/1
10 TABLET, FILM COATED, EXTENDED RELEASE ORAL 2 TIMES DAILY
Qty: 180 TABLET | Refills: 1 | Status: SHIPPED | OUTPATIENT
Start: 2022-04-12 | End: 2022-08-23 | Stop reason: SDUPTHER

## 2022-04-12 NOTE — PROGRESS NOTES
Stephanie Mao is a 77 y.o. female who presents for Type 2 diabetes mellitus. Current symptoms/problems include hyperglycemia and are worsening. 1.  DM type 2, uncontrolled, with neuropathy ([facility] [address] [E11.40, E11.65]     Diagnosed with Type 2 diabetes mellitus in 2012. Comorbid conditions: hyperlipidemia and Neuropathy     Current diabetic medications include: metformin  mg 4 tablets daily, glipizide XL 10 mg bid, lantus 60 units qhs     Had ovarian tumor removed and hysterectomy. Had kidney stones.     Intolerance to diabetes medications: No  Had yeast infections. Jim Bidding may not be a good choice. Weight trend: stable  Prior visit with dietician: yes  Current diet: on average, 2-3 meals per day  Current exercise: walks     Current monitoring regimen: home blood tests - 2 times daily  Has brought blood glucose log/meter:  No  Home blood sugar records: fasting range:  and postprandial range: 100-140  Any episodes of hypoglycemia? Yes  Hypoglycemia frequency and time(s):  one episode in 3 months  Does patient have Glucagon emergency kit? No  Does patient have rapid acting carbohydrate?  No  Does patient wear a medic alert bracelet or necklace?  No     2. Mixed hyperlipidemia  No muscle pain.     3. Essential hypertension  No headaches.     4. Obesity  Tries to eat healthier. Lower carbs.     5. History of Hyperthyroidism      10.3 mCi 131 capsule was administered orally on on 07/12/2019.           TSH <0.005, FT4 1.41  TSH low since 2017 2.19-0.18-0.051-<005  No palpitations, anxiety, had sleep problems for a long time. No family Hx of thyroid problems.     6.  Multinodular Goiter  No thyroid cancer in family. No radiation in her neck.     7. Elevated alkaline phosphatase level  No bone pain.     8.  Hypothyroidism  Has fatigue, similar to before.     EXAMINATION:   THYROID ULTRASOUND       10/3/2020       COMPARISON:   None.       HISTORY:   ORDERING SYSTEM PROVIDED HISTORY: Multinodular goiter     FINDINGS:   Right thyroid lobe:  3.8 x 1.1 x 1.2 cm       Left thyroid lobe:  2.6 x 1 x 1.6 cm       Isthmus:  3 mm thickness       Thyroid Gland:  Thyroid gland demonstrates mildly diffusely heterogeneous   echotexture and unremarkable vascularity.       Nodules: No thyroid nodules are present.       Cervical lymphadenopathy: Patient describes palpable abnormality at the left   side of the neck corresponding to a normal appearing 7 x 9 x 13 mm lymph node   along the lateral margin of the thyroid gland.  No abnormal lymph nodes in   the imaged portions of the neck.           Impression   Unremarkable thyroid ultrasound.           Department of Pathology  FINAL CYTOLOGY REPORT  Patient Name: Lashae Vogel               Accession No:  [id number]   Age Sex:   1955    63 Y / F       Location:      [facility]  Account No:   [de-identified]                  Collected:     2019  Med Rec No:    [id number]                 Received:      2019  Attend Phys:   Rich Landeros MD            Completed:     2019  Perform Phys: Rich Landeros MD    FINAL DIAGNOSIS:    Thyroid, Right Nodule Fine Needle Aspiration:     - Non-diangostic    COMMENT:    Insufficient well stained/preserved follicular cells present  for analysis (<6 groups of 10).    LANA/LANA    CLINICAL DIAGNOSIS:    Diagnosis: E04.1    SPECIMEN:  THYROID, FNA: RIGHT THYROID NODULE 1.4X1.7 C.M.    GROSS DESCRIPTION:  Received are 10 smeared slides and a specimen in  cytolyt.  LANA/LANA    MICROSCOPIC DESCRIPTION: Microscopic examination performed, including a  monolayer slide and H&E stained cell block section.     CPT: [id number] X1   [id number] X1    Case signed out at [facility], [address]  [address] [place] ,[address], [address]  Specimen was processed and screened at [facility],  [address] [place] [address]      EXAMINATION:   I123 Thyroid Uptake and Scan. 3/13/2019 8:10 am       TECHNIQUE:   318 microcuries I123 sodium iodide was administered p. o. Then, thyroid   uptake measurements were performed at 4 and 24 hours.  Pinhole images of the   thyroid were obtained at 4 hours.       COMPARISON:   Ultrasound 02/21/2019       HISTORY:   ORDERING SYSTEM PROVIDED HISTORY: Hyperthyroidism   TECHNOLOGIST PROVIDED HISTORY:   Ordering Physician Provided Reason for Exam: Hyperthyroidism; Multinodular   Goiter   Acuity: Unknown   Type of Exam: Initial       FINDINGS:   Thyroid uptake at 4 hours measured 29%.  Normal range at 4-6 hours is 6-18%.     Thyroid uptake at 24 hours measured 54%.  Normal range at 24 hours is 10-35%.        There is heterogeneous distribution of radiotracer, asymmetrically increased   in the right thyroid lobe compared to the left. Noreene Shouts is a dominant nodule   in the right thyroid lobe on the recent ultrasound.           Impression   1.  Elevated 24 hour uptake of 54%.  Increased uptake corresponds to the   dominant nodule in the right thyroid lobe with heterogeneous uptake in the   left thyroid lobe.  This could represent multinodular goiter or possibly a   nodular form of Graves disease.               Past Medical History:   Diagnosis Date    Class 2 obesity with body mass index (BMI) of 38.0 to 38.9 in adult 2/13/2019    Hyperlipidemia     Hypertension     Kidney stone     Ovarian mass     Ovarian tumor     Thyroid disease     Type II or unspecified type diabetes mellitus without mention of complication, not stated as uncontrolled       Patient Active Problem List   Diagnosis    HTN (hypertension)    Mixed hyperlipidemia    Abnormal EKG    DM type 2, uncontrolled, with neuropathy ([facility] [address]    History of hyperthyroidism    Elevated alkaline phosphatase level    Multinodular goiter    Postablative hypothyroidism    Class 3 severe obesity with body mass index (BMI) of 40.0 to 44.9 in adult [facility])    Optic neuropathy, ischemic, arteritic, right    Class 2 obesity with body mass index (BMI) of 39.0 to 39.9 in adult     Past Surgical History:   Procedure Laterality Date    HYSTERECTOMY      OVARY REMOVAL Bilateral     SALPINGO-OOPHORECTOMY N/A 12/10/2019    ROBOTIC ASSISTED OPERATIVE LAPAROSCOPY, BILATERAL SALPINGO-OOPHORECTOMY, DERMOID CYST IN LEFT OVARY, LYSIS OF ADHESIONS performed by Micah Moya MD at [address] [place] [address] History     Socioeconomic History    Marital status: Single     Spouse name: Not on file    Number of children: Not on file    Years of education: Not on file    Highest education level: Not on file   Occupational History    Not on file   Tobacco Use    Smoking status: Never Smoker    Smokeless tobacco: Never Used   Vaping Use    Vaping Use: Never used   Substance and Sexual Activity    Alcohol use: No    Drug use: No    Sexual activity: Never   Other Topics Concern    Not on file   Social History Narrative    Not on file     Social Determinants of Health     Financial Resource Strain:     Difficulty of Paying Living Expenses: Not on file   Food Insecurity:     Worried About Running Out of Food in the Last Year: Not on file    Michelle of Food in the Last Year: Not on file   Transportation Needs:     Lack of Transportation (Medical): Not on file    Lack of Transportation (Non-Medical):  Not on file   Physical Activity:     Days of Exercise per Week: Not on file    Minutes of Exercise per Session: Not on file   Stress:     Feeling of Stress : Not on file   Social Connections:     Frequency of Communication with Friends and Family: Not on file    Frequency of Social Gatherings with Friends and Family: Not on file    Attends [employer] Services: Not on file    Active Member of Clubs or Organizations: Not on file    Attends Club or Organization Meetings: Not on file    Marital Status: Not on file   Intimate Partner Violence:     Fear of Current or Ex-Partner: Not on file    Emotionally Abused: Not on file   Olya Weaverite Physically Abused: Not on file    Sexually Abused: Not on file   Housing Stability:     Unable to Pay for Housing in the Last Year: Not on file    Number of Places Lived in the Last Year: Not on file    Unstable Housing in the Last Year: Not on file     Family History   Problem Relation Age of Onset    Heart Disease Mother     Diabetes Mother     Diabetes Father     COPD Father     Diabetes Maternal Aunt     Heart Disease Brother     Cancer Sister      Current Outpatient Medications   Medication Sig Dispense Refill    glipiZIDE (GLUCOTROL XL) 10 MG extended release tablet Take 1 tablet by mouth 2 times daily 180 tablet 1    levothyroxine (SYNTHROID) 100 MCG tablet Take 1 tablet by mouth Daily 90 tablet 1    metFORMIN (GLUCOPHAGE XR) 500 MG extended release tablet Take 4 tablets by mouth daily 360 tablet 1    insulin glargine (BASAGLAR KWIKPEN) 100 UNIT/ML injection pen Inject 60 Units into the skin nightly 30 pen 1    allopurinol (ZYLOPRIM) 300 MG tablet Take 1 tablet by mouth daily      Potassium Citrate ER 15 MEQ (1620 MG) TBCR       Cholecalciferol (VITAMIN D3) 5000 units TABS Take one tab qod 30 tablet 0    furosemide (LASIX) 20 MG tablet Take 20 mg by mouth as needed Patient takes PRN      ibuprofen (ADVIL;MOTRIN) 200 MG tablet Take 200 mg by mouth every 6 hours as needed for Pain      simvastatin (ZOCOR) 20 MG tablet Take 20 mg by mouth nightly       lisinopril-hydrochlorothiazide (PRINZIDE;ZESTORETIC) 20-25 MG per tablet Take 1 tablet by mouth daily.  aspirin 81 MG tablet Take 81 mg by mouth daily.  Dulaglutide (TRULICITY) 6.44 FH/4.5CN SOPN Inject 0.75 mg into the skin once a week (Patient not taking: Reported on 1/11/2022) 4 pen 3    clopidogrel (PLAVIX) 75 MG tablet Take 75 mg by mouth daily Patient has not started this medication yet (Patient not taking: Reported on 9/27/2021)       No current facility-administered medications for this visit.      No Known Allergies  Family Status   Relation Name Status    Mother      Father     Sandy Castaneda  (Not Specified)    Sister  Alive    Brother  Alive    Sister         Lab Review:    Lab Results   Component Value Date    WBC 8.9 05/15/2020    HGB 11.7 05/15/2020    HCT 35.6 05/15/2020    MCV 78.2 05/15/2020     05/15/2020     Lab Results   Component Value Date     2022    K 4.2 2022    K 3.3 2019    CL 99 2022    CO2 27 2022    BUN 21 2022    CREATININE 0.9 2022    GLUCOSE 71 2022    CALCIUM 8.9 2022    PROT 6.4 2022    LABALBU 4.0 2022    BILITOT 0.3 2022    ALKPHOS 138 2022    AST 20 2022    ALT 8 2022    LABGLOM >60 2022    GFRAA >60 2022    AGRATIO 1.7 2022    GLOB 3.2 2021     Lab Results   Component Value Date    TSH 3.55 2022    FT3 2.5 2022     Lab Results   Component Value Date    LABA1C 8.5 2022     Lab Results   Component Value Date    .3 2022     Lab Results   Component Value Date    CHOL 176 2022     Lab Results   Component Value Date    TRIG 90 2022     Lab Results   Component Value Date    HDL 57 2022     Lab Results   Component Value Date    LDLCALC 101 2022     Lab Results   Component Value Date    LABVLDL 18 2022     No results found for: [facility]  Lab Results   Component Value Date    LABMICR 3.10 2022    LABMICR YES 2019     Lab Results   Component Value Date    VITD25 55.6 2022        Review of Systems:  Constitutional: has fatigue, no fever, no recent weight gain, has recent weight loss, has changes in appetite  Eyes: no eye pain, no change in vision, no eye redness, no eye irritation, no double vision  Ears, nose, throat: no nasal congestion, no sore throat, no earache, no decrease in hearing, no hoarseness, no dry mouth, no sinus problems, no difficulty swallowing, no neck lumps, no dental problems, no mouth sores, no ringing in ears  Pulmonary: no shortness of breath, no wheezing, no dyspnea on exertion, no cough  Cardiovascular: no chest pain, has lower extremity edema, no orthopnea, no intermittent leg claudication, no palpitations  Gastrointestinal: no abdominal pain, no nausea, no vomiting, no diarrhea, no constipation, no dysphagia, no heartburn, no bloating  Genitourinary: no dysuria, no urinary incontinence, no urinary hesitancy, no urinary frequency, no feelings of urinary urgency, no nocturia  Musculoskeletal: no joint swelling, no joint stiffness, has joint pain, no muscle cramps, no muscle pain, no bone pain  Integument/Breast: no hair loss, no skin rashes, no skin lesions, no itching, no dry skin  Neurological: no numbness, no tingling, no weakness, no confusion, no headaches, no dizziness, no fainting, no tremors, no decrease in memory, no balance problems  Psychiatric: no anxiety, no depression, has insomnia  Hematologic/Lymphatic: no tendency for easy bleeding, no swollen lymph nodes, has tendency for easy bruising  Immunology: no seasonal allergies, no frequent infections, no frequent illnesses  Endocrine: no temperature intolerance    /68   Pulse 65   Temp 98 °F (36.7 °C)   Resp 14   Ht 5' 2\" (1.575 m)   Wt 219 lb (99.3 kg)   LMP  (LMP Unknown)   SpO2 98%   BMI 40.06 kg/m²    Wt Readings from Last 3 Encounters:   04/12/22 219 lb (99.3 kg)   01/11/22 217 lb (98.4 kg)   09/27/21 218 lb 12.8 oz (99.2 kg)     Body mass index is 40.06 kg/m².       OBJECTIVE:  Constitutional: no acute distress, well appearing and well nourished  Psychiatric: oriented to person, place and time, judgement and insight and normal, recent and remote memory and intact and mood and affect are normal  Skin: skin and subcutaneous tissue is normal without mass, normal turgor  Head and Face: examination of head and face revealed no abnormalities  Eyes: no lid or conjunctival swelling, erythema or discharge, pupils are normal, equal, round, reactive to light  Ears/Nose: external inspection of ears and nose revealed no abnormalities, hearing is grossly normal  Oropharynx/Mouth/Face: lips, tongue and gums are normal with no lesions, the voice quality was normal  Neck: neck is supple and symmetric, with midline trachea and no masses, thyroid is normal  Lymphatics: normal cervical lymph nodes, normal supraclavicular nodes  Pulmonary: no increased work of breathing or signs of respiratory distress, lungs are clear to auscultation  Cardiovascular: normal heart rate and rhythm, normal S1 and S2, no murmurs and pedal pulses and 2+ bilaterally, 1+ edema  Abdomen: abdomen is soft, non-tender with no masses  Musculoskeletal: normal gait and station and exam of the digits and nails are normal  Neurological: normal coordination and normal general cortical function, has mild hand tremor    Visual inspection:  Deformity/amputation: absent  Skin lesions/pre-ulcerative calluses: present calluses  Edema: right- negative, left- negative     Sensory exam:  Monofilament sensation: normal  (minimum of 5 random plantar locations tested, avoiding callused areas - > 1 area with absence of sensation is + for neuropathy)     Plus at least one of the following:  Pulses: normal  Proprioception: Intact  Vibration (128 Hz): Impaired    ASSESSMENT/PLAN:  1. DM type 2, uncontrolled, with neuropathy (HCC)  Does diet and exercise  Hemoglobin A1c 8.7-7.8-7.2-7.1-7.2-8.3-8.6-8.5-8.7-8.8-8.7-8.5  Uncontrolled. Metformin  mg 2 tabs bid  Glipizide 10 mg ER bid  Basaglar 60 units qhs  Start Trulicity 9.25 mg weekly, never started yet, she is afraid  - Comprehensive Metabolic Panel; Future  - Hemoglobin A1C; Future  - Microalbumin / Creatinine Urine Ratio; Future  Having Urology evaluation     2. Mixed hyperlipidemia  Worsening  LDL 93-31-76-40-21-89-65-61-  - Lipid Panel;  Future     3. Essential hypertension  Continue current treatment     4. Obesity  Diet, exercise.     5. History of Hyperthyroidism  Had I-131 Tx.  TSH receptor antibody and TSI positive. Also positive TPO antibody. 10.3 mCi 131 capsule was administered orally on on 07/12/2019.       6. Elevated alkaline phosphatase level  Normal PTH, normal calcium. Alkaline phosphatase, elevated - liver fraction. Follow with PCP  Alk phos [phone]  Calcium 9.6  - Alkaline Phosphatase, Isoenzymes; Future  - PTH, Intact; Future  - Vitamin D 25 Hydroxy; Future  - Phosphorus; Future  Vitamin D to 5000 IU qod.  25 hydroxy vitamin D 53-50-56-55. 6     7.  Multinodular Goiter  10/2020 thyroid nodule not seen  Repeated FNA 6/2019 by Dr. Mary Clinton. Right 1.7 cm nodule nondiagnostic  Thyroid, Right Nodule Fine Needle Aspiration:     - Non-diagnostic  - US Head Neck Soft Tissue Thyroid; Future     8. Hypothyroidism, postablative  Continue levothyroxine 0.1 mg daily  TSH 4.74-18.8-4.07-2.97-1.96-4.31-1.97-2.44-3.05-2.21-3.55  -TSH  - T3, Free; Future  - T4, Free; Future  Reviewed and/or ordered clinical lab results Yes  Reviewed and/or ordered radiology tests Yes  Reviewed and/or ordered other     Reviewed and/or ordered clinical lab results Yes  Reviewed and/or ordered radiology tests Yes  Reviewed and/or ordered other diagnostic tests No  Discussed test results with performing physician No  Independently reviewed image, tracing, or specimen Yes, thyroid uptake and scan  Made a decision to obtain old records No  Reviewed and summarized old records Yes  TSH less than 0.005, PTH 30, FT4 1.41, ionized calcium 1.13, hemoglobin 11.8, hemoglobin A1c 8.3, LDL cholesterol 85, glucose 104, alkaline phosphatase 212.   Obtained history from other than patient No    Mckinley Abreu was counseled regarding symptoms of diabetes, elevated alk phos, hyperthyroidism diagnosis, course and complications of disease if inadequately treated, side effects of medications, diagnosis, treatment options, and prognosis, risks, benefits, complications, and alternatives of treatment, labs, imaging and other studies and treatment targets and goals, insulin, medications, hyperthyroid treatment, I-131 Tx, permanent hypothyroidism. She understands instructions and counseling. Return in about 3 months (around 7/12/2022) for diabetes.

## 2022-05-12 ENCOUNTER — OFFICE VISIT (OUTPATIENT)
Dept: CARDIOLOGY CLINIC | Age: 67
End: 2022-05-12
Payer: COMMERCIAL

## 2022-05-12 VITALS
SYSTOLIC BLOOD PRESSURE: 132 MMHG | DIASTOLIC BLOOD PRESSURE: 80 MMHG | WEIGHT: 221 LBS | OXYGEN SATURATION: 98 % | BODY MASS INDEX: 40.67 KG/M2 | HEART RATE: 75 BPM | HEIGHT: 62 IN

## 2022-05-12 DIAGNOSIS — I10 PRIMARY HYPERTENSION: Primary | ICD-10-CM

## 2022-05-12 DIAGNOSIS — E78.2 MIXED HYPERLIPIDEMIA: ICD-10-CM

## 2022-05-12 PROCEDURE — 99214 OFFICE O/P EST MOD 30 MIN: CPT | Performed by: INTERNAL MEDICINE

## 2022-05-12 RX ORDER — ATORVASTATIN CALCIUM 40 MG/1
40 TABLET, FILM COATED ORAL DAILY
Qty: 90 TABLET | Refills: 3 | Status: SHIPPED | OUTPATIENT
Start: 2022-05-12

## 2022-05-12 RX ORDER — POTASSIUM CITRATE 15 MEQ/1
TABLET, EXTENDED RELEASE ORAL
COMMUNITY

## 2022-05-12 NOTE — PROGRESS NOTES
[facility]   Cardiac Evaluation      Patient: Luma De Guzman  YOB: 1955  Date: 22       Chief Complaint   Patient presents with    Hypertension    Hyperlipidemia        Referring provider: Juan Francisco MCCRARY    History of Present Illness:   Ms Alexa Elise is seen today in follow up. She was previously seen in  for abnormal EKG findings. She had presented to pcp for pre-op clearance for EGD and EKG was performed. Hoda Bergeron has a history of hypertension, hyperlipidemia, and diabetes, hyperthyroidism. She does not smoke or drink alcohol. Hoda Bergeron is a  at [employer]. Surgeries include hysterectomy. Family history of mother having had CAD, is  ( after open heart at [facility]). Ms Alexa Elise had been following with Dr Nia Pyle, neurologist, for shadow/floater in her eye. She was advised to have an echo for ? Small vessel disease w/ optic arteritis and ? TIA. She was given plavix and asa daily. Today, Ms Alexa Elise states she has been doing ok. She states she had an episode of bleeding after shaving her legs, so she stopped taking Plavix. She remains on asa daily. Hoda Bergeron denies chest pain, palpitations, dizziness, or edema. Unfortunately, she has gained weight. Past Medical History:   has a past medical history of Class 2 obesity with body mass index (BMI) of 38.0 to 38.9 in adult, Hyperlipidemia, Hypertension, Kidney stone, Ovarian mass, Ovarian tumor, Thyroid disease, and Type II or unspecified type diabetes mellitus without mention of complication, not stated as uncontrolled. Surgical History:   has a past surgical history that includes Hysterectomy; Ronda tooth extraction; Salpingo-oophorectomy (N/A, 12/10/2019); and Ovary removal (Bilateral). Social History:  History     Social History    Marital Status: Single     Spouse Name: N/A     Number of Children: N/A    Years of Education: N/A     Occupational History     at [employer].      Social History Main Topics    Smoking status: Never Smoker     Smokeless tobacco: Not on file    Alcohol Use: No    Drug Use: Not on file    Sexually Active: Not on file     Other Topics Concern    Not on file     Social History Narrative    No narrative on file       Family History:  family history includes COPD in her father; Cancer in her sister; Diabetes in her father, maternal aunt, and mother; Heart Disease in her brother and mother. Allergies:  Patient has no known allergies. Review of Systems:   · Constitutional: there has been no unanticipated weight loss. No change in energy or activity level   · Eyes: No visual changes   · ENT: No Headaches, hearing loss or vertigo. No mouth sores or sore throat. · Cardiovascular: Reviewed in HPI  · Respiratory: No cough or wheezing, no sputum production. · Gastrointestinal: No abdominal pain, appetite loss, blood in stools. No change in bowel or bladder habits. · Genitourinary: No nocturia, dysuria, trouble voiding  · Musculoskeletal:  No gait disturbance, weakness or joint complaints. · Integumentary: No rash or pruritis. · Neurological: No headache, change in muscle strength, numbness or tingling. No change in gait, balance, coordination, mood, affect, memory, mentation, behavior. · Psychiatric: No anxiety or depression  · Endocrine: No malaise or fever  · Hematologic/Lymphatic: No abnormal bruising or bleeding, blood clots or swollen lymph nodes. · Allergic/Immunologic: No nasal congestion or hives. Physical Examination:    Vitals:    05/12/22 1540   BP: 132/80   Site: Left Upper Arm   Position: Sitting   Cuff Size: Medium Adult   Pulse: 75   SpO2: 98%   Weight: 221 lb (100.2 kg)   Height: 5' 2\" (1.575 m)     Body mass index is 40.42 kg/m².      Wt Readings from Last 3 Encounters:   05/12/22 221 lb (100.2 kg)   04/12/22 219 lb (99.3 kg)   01/11/22 217 lb (98.4 kg)      BP Readings from Last 3 Encounters:   05/12/22 132/80   04/12/22 130/68   01/11/22 118/64 Constitutional and General Appearance:  appears stated age  Respiratory:  · Normal excursion and expansion without use of accessory muscles  · Resp Auscultation: Normal breath sounds without dullness  Cardiovascular:  · The apical impulses not displaced  · Heart is regular rate and rhythm with normal S1, S2  · The carotid upstroke is normal, no bruit noted   · JVP is not elevated  · Peripheral pulses are symmetrical  · There is no clubbing, cyanosis of the extremities  · 1-2+ edema BLE  · Femoral Arteries: 2+ and equal  · Pedal Pulses: 2+ and equal   Abdomen:  · No masses or tenderness  · Normal bowel sounds  Neurological/Psychiatric:  · Alert and oriented x3  · Moves all extremities well  · Exhibits normal gait balance and coordination      Assessment/Plan  1. HTN (hypertension)  -stable  On lisinopril/HCTZ   2. Hyperlipemia -treated, follows with pcp, labs 4/5/22: ; TRIG 90; HDL 57; , simvastatin 20mg daily, will change to lipitor 40mg daily. 3. Abnormal EKG -normal sinus rhythm, left axis deviation. This is due to her body habitus. She has no cardiac symptoms nor previous MI  Echo 12/11/20: Normal left ventricle size, mild to moderate wall thickness and normal systolic function with EF 60-65%. No regional wall motion abnormalities are seen. Indeterminate diastolic function. E/e\"=10.15. Trivial pulmonic regurgitation present  Carotid doppler 12/11/20: <50% MAU, no stenosis LICA        PLAN:  Will change simvastatin to Lipitor 40mg daily. Repeat lipids and CMP in 2-3 months. Encouraged to work on exercise thru diet and exercise. FU 1 year. Scribe's attestation: This note was scribed in the presence of Dr Ro Ceja MD by Pham Patiño, YASMANY. The scribe's documentation has been prepared under my direction and personally reviewed by me in its entirety. I confirm that the note above accurately reflects all work, treatment, procedures, and medical decision making performed by me.        Thank you for allowing to me to participate in the care of Melissa Carrera.

## 2022-08-20 ENCOUNTER — HOSPITAL ENCOUNTER (OUTPATIENT)
Age: 67
Discharge: HOME | End: 2022-08-20
Payer: COMMERCIAL

## 2022-08-20 DIAGNOSIS — I10 PRIMARY HYPERTENSION: ICD-10-CM

## 2022-08-20 DIAGNOSIS — E78.2 MIXED HYPERLIPIDEMIA: ICD-10-CM

## 2022-08-20 DIAGNOSIS — E89.0 POSTABLATIVE HYPOTHYROIDISM: ICD-10-CM

## 2022-08-20 DIAGNOSIS — Z86.39 HISTORY OF HYPERTHYROIDISM: ICD-10-CM

## 2022-08-20 DIAGNOSIS — E04.2 MULTINODULAR GOITER: ICD-10-CM

## 2022-08-20 DIAGNOSIS — R74.8 ELEVATED ALKALINE PHOSPHATASE LEVEL: ICD-10-CM

## 2022-08-20 LAB
A/G RATIO: 1.3 (ref 1.1–2.2)
ALBUMIN SERPL-MCNC: 4 G/DL (ref 3.4–5)
ALP BLD-CCNC: 156 U/L (ref 40–129)
ALT SERPL-CCNC: 9 U/L (ref 10–40)
ANION GAP SERPL CALCULATED.3IONS-SCNC: 10 MMOL/L (ref 3–16)
AST SERPL-CCNC: 22 U/L (ref 15–37)
BILIRUB SERPL-MCNC: 0.4 MG/DL (ref 0–1)
BUN BLDV-MCNC: 27 MG/DL (ref 7–20)
CALCIUM SERPL-MCNC: 9.2 MG/DL (ref 8.3–10.6)
CHLORIDE BLD-SCNC: 103 MMOL/L (ref 99–110)
CHOLESTEROL, TOTAL: 157 MG/DL (ref 0–199)
CO2: 30 MMOL/L (ref 21–32)
CREAT SERPL-MCNC: 1 MG/DL (ref 0.6–1.2)
CREATININE URINE: 111.5 MG/DL (ref 28–259)
GFR AFRICAN AMERICAN: >60
GFR NON-AFRICAN AMERICAN: 55
GLUCOSE BLD-MCNC: 64 MG/DL (ref 70–99)
HDLC SERPL-MCNC: 51 MG/DL (ref 40–60)
LDL CHOLESTEROL CALCULATED: 89 MG/DL
MICROALBUMIN UR-MCNC: 1.9 MG/DL
MICROALBUMIN/CREAT UR-RTO: 17 MG/G (ref 0–30)
POTASSIUM SERPL-SCNC: 3.8 MMOL/L (ref 3.5–5.1)
SODIUM BLD-SCNC: 143 MMOL/L (ref 136–145)
T4 FREE: 1.4 NG/DL (ref 0.9–1.8)
TOTAL PROTEIN: 7.2 G/DL (ref 6.4–8.2)
TRIGL SERPL-MCNC: 85 MG/DL (ref 0–150)
TSH SERPL DL<=0.05 MIU/L-ACNC: 2.6 UIU/ML (ref 0.27–4.2)
VLDLC SERPL CALC-MCNC: 17 MG/DL

## 2022-08-20 PROCEDURE — 36415 COLL VENOUS BLD VENIPUNCTURE: CPT

## 2022-08-20 PROCEDURE — 84443 ASSAY THYROID STIM HORMONE: CPT

## 2022-08-20 PROCEDURE — 84439 ASSAY OF FREE THYROXINE: CPT

## 2022-08-20 PROCEDURE — 80061 LIPID PANEL: CPT

## 2022-08-20 PROCEDURE — 83036 HEMOGLOBIN GLYCOSYLATED A1C: CPT

## 2022-08-20 PROCEDURE — 80053 COMPREHEN METABOLIC PANEL: CPT

## 2022-08-20 PROCEDURE — 82570 ASSAY OF URINE CREATININE: CPT

## 2022-08-20 PROCEDURE — 82043 UR ALBUMIN QUANTITATIVE: CPT

## 2022-08-21 LAB
ESTIMATED AVERAGE GLUCOSE: 205.9 MG/DL
HBA1C MFR BLD: 8.8 %

## 2022-08-23 ENCOUNTER — OFFICE VISIT (OUTPATIENT)
Dept: ENDOCRINOLOGY | Age: 67
End: 2022-08-23
Payer: COMMERCIAL

## 2022-08-23 VITALS
HEIGHT: 62 IN | BODY MASS INDEX: 40.12 KG/M2 | DIASTOLIC BLOOD PRESSURE: 76 MMHG | HEART RATE: 78 BPM | RESPIRATION RATE: 14 BRPM | WEIGHT: 218 LBS | OXYGEN SATURATION: 98 % | SYSTOLIC BLOOD PRESSURE: 124 MMHG | TEMPERATURE: 98 F

## 2022-08-23 DIAGNOSIS — I10 PRIMARY HYPERTENSION: ICD-10-CM

## 2022-08-23 DIAGNOSIS — Z86.39 HISTORY OF HYPERTHYROIDISM: ICD-10-CM

## 2022-08-23 DIAGNOSIS — E89.0 POSTABLATIVE HYPOTHYROIDISM: ICD-10-CM

## 2022-08-23 DIAGNOSIS — E78.2 MIXED HYPERLIPIDEMIA: ICD-10-CM

## 2022-08-23 DIAGNOSIS — E66.01 CLASS 2 SEVERE OBESITY WITH SERIOUS COMORBIDITY AND BODY MASS INDEX (BMI) OF 39.0 TO 39.9 IN ADULT, UNSPECIFIED OBESITY TYPE (HCC): ICD-10-CM

## 2022-08-23 DIAGNOSIS — E04.2 MULTINODULAR GOITER: ICD-10-CM

## 2022-08-23 DIAGNOSIS — R74.8 ELEVATED ALKALINE PHOSPHATASE LEVEL: ICD-10-CM

## 2022-08-23 PROBLEM — E66.813 CLASS 3 SEVERE OBESITY WITH SERIOUS COMORBIDITY AND BODY MASS INDEX (BMI) OF 40.0 TO 44.9 IN ADULT: Status: ACTIVE | Noted: 2022-08-23

## 2022-08-23 PROCEDURE — 1123F ACP DISCUSS/DSCN MKR DOCD: CPT | Performed by: INTERNAL MEDICINE

## 2022-08-23 PROCEDURE — 3052F HG A1C>EQUAL 8.0%<EQUAL 9.0%: CPT | Performed by: INTERNAL MEDICINE

## 2022-08-23 PROCEDURE — 99215 OFFICE O/P EST HI 40 MIN: CPT | Performed by: INTERNAL MEDICINE

## 2022-08-23 RX ORDER — GLIPIZIDE 10 MG/1
10 TABLET, FILM COATED, EXTENDED RELEASE ORAL 2 TIMES DAILY
Qty: 180 TABLET | Refills: 1 | Status: SHIPPED | OUTPATIENT
Start: 2022-08-23

## 2022-08-23 RX ORDER — FLASH GLUCOSE SENSOR
KIT MISCELLANEOUS
Qty: 6 EACH | Refills: 1 | Status: SHIPPED | OUTPATIENT
Start: 2022-08-23

## 2022-08-23 RX ORDER — FLASH GLUCOSE SCANNING READER
EACH MISCELLANEOUS
Qty: 1 EACH | Refills: 0 | Status: SHIPPED | OUTPATIENT
Start: 2022-08-23

## 2022-08-23 RX ORDER — DULAGLUTIDE 1.5 MG/.5ML
1.5 INJECTION, SOLUTION SUBCUTANEOUS WEEKLY
Qty: 4 PEN | Refills: 2 | Status: SHIPPED
Start: 2022-08-23

## 2022-08-23 RX ORDER — METFORMIN HYDROCHLORIDE 500 MG/1
2000 TABLET, EXTENDED RELEASE ORAL DAILY
Qty: 360 TABLET | Refills: 1 | Status: SHIPPED | OUTPATIENT
Start: 2022-08-23

## 2022-08-23 RX ORDER — INSULIN GLARGINE 300 U/ML
60 INJECTION, SOLUTION SUBCUTANEOUS NIGHTLY
COMMUNITY
Start: 2022-06-20

## 2022-08-23 NOTE — PROGRESS NOTES
goiter       FINDINGS:   Right thyroid lobe:  3.8 x 1.1 x 1.2 cm       Left thyroid lobe:  2.6 x 1 x 1.6 cm       Isthmus:  3 mm thickness       Thyroid Gland:  Thyroid gland demonstrates mildly diffusely heterogeneous   echotexture and unremarkable vascularity. Nodules: No thyroid nodules are present. Cervical lymphadenopathy: Patient describes palpable abnormality at the left   side of the neck corresponding to a normal appearing 7 x 9 x 13 mm lymph node   along the lateral margin of the thyroid gland. No abnormal lymph nodes in   the imaged portions of the neck. Impression   Unremarkable thyroid ultrasound. Department of Pathology  FINAL CYTOLOGY REPORT  Patient Name:  Fox Caballero               Accession No:  [id number]   Age Sex:   1955    61 Y / F       Location:      [facility]  Account No:    [de-identified]                  Collected:     2019  [facility] Rec No:    [id number]                 Received:      2019  Attend Phys:   Sanket Chicas MD            Completed:     2019  Perform Phys:  Sanket Chicas MD    FINAL DIAGNOSIS:    Thyroid, Right Nodule Fine Needle Aspiration:     - Non-diangostic    COMMENT:    Insufficient well stained/preserved follicular cells present  for analysis (<6 groups of 10). LANA/LANA    CLINICAL DIAGNOSIS:    Diagnosis: E04.1    SPECIMEN:  THYROID, FNA: RIGHT THYROID NODULE 1.4X1.7 C.M.    GROSS DESCRIPTION:  Received are 10 smeared slides and a specimen in  cytolyt. LANA/LANA    MICROSCOPIC DESCRIPTION: Microscopic examination performed, including a  monolayer slide and H&E stained cell block section.     CPT: 22664 X1   25752 X1    Case signed out at [facility], [address] [facility], [address] [facility]  Specimen was processed and screened at [facility],  [address] [place] [address]      EXAMINATION:   I123 Thyroid Uptake and Scan. 3/13/2019 8:10 am       TECHNIQUE: 318 microcuries I123 sodium iodide was administered p. o. Then, thyroid   uptake measurements were performed at 4 and 24 hours. Pinhole images of the   thyroid were obtained at 4 hours. COMPARISON:   Ultrasound 02/21/2019       HISTORY:   ORDERING SYSTEM PROVIDED HISTORY: Hyperthyroidism   TECHNOLOGIST PROVIDED HISTORY:   Ordering Physician Provided Reason for Exam: Hyperthyroidism; Multinodular   Goiter   Acuity: Unknown   Type of Exam: Initial       FINDINGS:   Thyroid uptake at 4 hours measured 29%. Normal range at 4-6 hours is 6-18%. Thyroid uptake at 24 hours measured 54%. Normal range at 24 hours is 10-35%. There is heterogeneous distribution of radiotracer, asymmetrically increased   in the right thyroid lobe compared to the left. There is a dominant nodule   in the right thyroid lobe on the recent ultrasound. Impression   1. Elevated 24 hour uptake of 54%. Increased uptake corresponds to the   dominant nodule in the right thyroid lobe with heterogeneous uptake in the   left thyroid lobe. This could represent multinodular goiter or possibly a   nodular form of Graves disease.                Past Medical History:   Diagnosis Date    Class 2 obesity with body mass index (BMI) of 38.0 to 38.9 in adult 2/13/2019    Hyperlipidemia     Hypertension     Kidney stone     Ovarian mass     Ovarian tumor     Thyroid disease     Type II or unspecified type diabetes mellitus without mention of complication, not stated as uncontrolled       Patient Active Problem List   Diagnosis    HTN (hypertension)    Mixed hyperlipidemia    Abnormal EKG    DM type 2, uncontrolled, with neuropathy ([facility] [address]    History of hyperthyroidism    Elevated alkaline phosphatase level    Multinodular goiter    Postablative hypothyroidism    Class 3 severe obesity with body mass index (BMI) of 40.0 to 44.9 in adult [facility])    Optic neuropathy, ischemic, arteritic, right    Class 2 obesity with body mass index (BMI) of 39.0 to 39.9 in adult    Primary hypertension    Class 3 severe obesity with serious comorbidity and body mass index (BMI) of 40.0 to 44.9 in adult [facility])     Past Surgical History:   Procedure Laterality Date    HYSTERECTOMY (CERVIX STATUS UNKNOWN)      OVARY REMOVAL Bilateral     SALPINGO-OOPHORECTOMY N/A 12/10/2019    ROBOTIC ASSISTED OPERATIVE LAPAROSCOPY, BILATERAL SALPINGO-OOPHORECTOMY, DERMOID CYST IN LEFT OVARY, LYSIS OF ADHESIONS performed by Beata Ashton MD at [facility] History     Socioeconomic History    Marital status: Single     Spouse name: Not on file    Number of children: Not on file    Years of education: Not on file    Highest education level: Not on file   Occupational History    Not on file   Tobacco Use    Smoking status: Never    Smokeless tobacco: Never   Vaping Use    Vaping Use: Never used   Substance and Sexual Activity    Alcohol use: No    Drug use: No    Sexual activity: Never   Other Topics Concern    Not on file   Social History Narrative    Not on file     Social Determinants of Health     Financial Resource Strain: Not on file   Food Insecurity: Not on file   Transportation Needs: Not on file   Physical Activity: Not on file   Stress: Not on file   Social Connections: Not on file   Intimate Partner Violence: Not on file   Housing Stability: Not on file     Family History   Problem Relation Age of Onset    Heart Disease Mother     Diabetes Mother     Diabetes Father     COPD Father     Diabetes Maternal Aunt     Heart Disease Brother     Cancer Sister      Current Outpatient Medications   Medication Sig Dispense Refill    MARS CANCHOLA SOLOSTAR 300 UNIT/ML SOPN Inject 60 Units into the skin nightly      metFORMIN (GLUCOPHAGE XR) 500 MG extended release tablet Take 4 tablets by mouth daily 360 tablet 1    glipiZIDE (GLUCOTROL XL) 10 MG extended release tablet Take 1 tablet by mouth 2 times daily 180 tablet 1    Dulaglutide (TRULICITY) 1.5 FX/6.3LR SOPN Inject 1.5 mg into the skin once a week 4 pen 2    Potassium Citrate ER 15 MEQ (1620 MG) TBCR Take by mouth      atorvastatin (LIPITOR) 40 MG tablet Take 1 tablet by mouth daily 90 tablet 3    levothyroxine (SYNTHROID) 100 MCG tablet Take 1 tablet by mouth Daily 90 tablet 1    allopurinol (ZYLOPRIM) 300 MG tablet Take 1 tablet by mouth daily      Cholecalciferol (VITAMIN D3) 5000 units TABS Take one tab qod 30 tablet 0    furosemide (LASIX) 20 MG tablet Take 20 mg by mouth as needed Patient takes PRN      ibuprofen (ADVIL;MOTRIN) 200 MG tablet Take 200 mg by mouth every 6 hours as needed for Pain      lisinopril-hydrochlorothiazide (PRINZIDE;ZESTORETIC) 20-25 MG per tablet Take 1 tablet by mouth daily. aspirin 81 MG tablet Take 81 mg by mouth daily. No current facility-administered medications for this visit.      No Known Allergies  Family Status   Relation Name Status    Mother      Father      Javier Eng  (Not Specified)    Sister  [de-identified]    Brother  Alive    Sister         Lab Review:    Lab Results   Component Value Date/Time    WBC 8.9 05/15/2020 08:47 AM    HGB 11.7 05/15/2020 08:47 AM    HCT 35.6 05/15/2020 08:47 AM    MCV 78.2 05/15/2020 08:47 AM     05/15/2020 08:47 AM     Lab Results   Component Value Date/Time     2022 11:21 AM    K 3.8 2022 11:21 AM    K 3.3 2019 09:39 AM     2022 11:21 AM    CO2 30 2022 11:21 AM    BUN 27 2022 11:21 AM    CREATININE 1.0 2022 11:21 AM    GLUCOSE 64 2022 11:21 AM    CALCIUM 9.2 2022 11:21 AM    PROT 7.2 2022 11:21 AM    LABALBU 4.0 2022 11:21 AM    BILITOT 0.4 2022 11:21 AM    ALKPHOS 156 2022 11:21 AM    AST 22 2022 11:21 AM    ALT 9 2022 11:21 AM    LABGLOM 55 2022 11:21 AM    GFRAA >60 2022 11:21 AM    AGRATIO 1.3 2022 11:21 AM    GLOB 3.2 2021 10:34 AM     Lab Results   Component Value Date/Time    TSH masses  Musculoskeletal: normal gait and station and exam of the digits and nails are normal  Neurological: normal coordination and normal general cortical function, has mild hand tremor    Visual inspection:  Deformity/amputation: absent  Skin lesions/pre-ulcerative calluses: present calluses  Edema: right- negative, left- negative     Sensory exam:  Monofilament sensation: normal  (minimum of 5 random plantar locations tested, avoiding callused areas - > 1 area with absence of sensation is + for neuropathy)     Plus at least one of the following:  Pulses: normal  Proprioception: Intact  Vibration (128 Hz): Impaired    ASSESSMENT/PLAN:  1. DM type 2, uncontrolled, with neuropathy ([facility] [address]  Put Cindi to evaluate glycemic patterns  Does diet and exercise  Hemoglobin A1c 8.7-7.8-7.2-7.1-7.2-8.3-8.6-8.5-8.7-8.8-8.7-8.5-8.8  Uncontrolled. Metformin  mg 2 tabs bid  Glipizide 10 mg ER bid  Toujeo Max 60 units qhs  Start Trulicity 3.49 mg weekly, never started yet, she was afraid  - Comprehensive Metabolic Panel; Future  - Hemoglobin A1C; Future  - Microalbumin / Creatinine Urine Ratio; Future  Having Urology evaluation     2. Mixed hyperlipidemia  LDL [id number]--19  - Lipid Panel; Future     3. Primary hypertension  Continue current treatment     4. Obesity  Diet, exercise. 5. History of Hyperthyroidism  Had I-131 Tx.  TSH receptor antibody and TSI positive. Also positive TPO antibody. 10.3 mCi 131 capsule was administered orally on on 07/12/2019.       6. Elevated alkaline phosphatase level  Normal PTH, normal calcium. Alkaline phosphatase, elevated - liver fraction. Follow with PCP  Alk phos [phone]  Calcium 9.6  - Alkaline Phosphatase, Isoenzymes; Future  - PTH, Intact; Future  - Vitamin D 25 Hydroxy; Future  - Phosphorus; Future  Vitamin D to 5000 IU qod.  25 hydroxy vitamin D 53-50-56-55.6     7.   Multinodular Goiter  10/2020 thyroid nodule not seen  Repeated FNA 6/2019 by Dr. Darya Dewitt. Right 1.7 cm nodule nondiagnostic  Thyroid, Right Nodule Fine Needle Aspiration:     - Non-diagnostic  - US Head Neck Soft Tissue Thyroid; Future     8. Hypothyroidism, postablative  Continue levothyroxine 0.1 mg daily  TSH 4.74-18.8-4.07-2.97-1.96-4.31-1.97-2.44-3.05-2.21-3.55-2.6  -TSH  - T3, Free; Future  - T4, Free; Future      Reviewed and/or ordered clinical lab results Yes  Reviewed and/or ordered radiology tests Yes  Reviewed and/or ordered other     Reviewed and/or ordered clinical lab results Yes  Reviewed and/or ordered radiology tests Yes  Reviewed and/or ordered other diagnostic tests No  Discussed test results with performing physician No  Independently reviewed image, tracing, or specimen Yes, thyroid uptake and scan  Made a decision to obtain old records No  Reviewed and summarized old records Yes  TSH less than 0.005, PTH 30, FT4 1.41, ionized calcium 1.13, hemoglobin 11.8, hemoglobin A1c 8.3, LDL cholesterol 85, glucose 104, alkaline phosphatase 212. Obtained history from other than patient No    Mayank Fonseca was counseled regarding symptoms of diabetes, elevated alk phos, hyperthyroidism diagnosis, course and complications of disease if inadequately treated, side effects of medications, diagnosis, treatment options, and prognosis, risks, benefits, complications, and alternatives of treatment, labs, imaging and other studies and treatment targets and goals, insulin, medications, hyperthyroid treatment, I-131 Tx, permanent hypothyroidism, CGM. She understands instructions and counseling. Return in about 3 months (around 11/23/2022) for diabetes.

## 2022-08-25 ENCOUNTER — TELEPHONE (OUTPATIENT)
Dept: CARDIOLOGY CLINIC | Age: 67
End: 2022-08-25

## 2022-10-10 DIAGNOSIS — E89.0 POSTABLATIVE HYPOTHYROIDISM: ICD-10-CM

## 2022-10-10 RX ORDER — LEVOTHYROXINE SODIUM 0.1 MG/1
TABLET ORAL
Qty: 90 TABLET | Refills: 0 | Status: SHIPPED | OUTPATIENT
Start: 2022-10-10

## 2022-11-12 ENCOUNTER — HOSPITAL ENCOUNTER (OUTPATIENT)
Dept: ULTRASOUND IMAGING | Age: 67
Discharge: HOME | End: 2022-11-12
Payer: COMMERCIAL

## 2022-11-12 DIAGNOSIS — E04.2 MULTINODULAR GOITER: ICD-10-CM

## 2022-11-12 PROCEDURE — 76536 US EXAM OF HEAD AND NECK: CPT

## 2022-12-07 ENCOUNTER — TELEPHONE (OUTPATIENT)
Dept: ENDOCRINOLOGY | Age: 67
End: 2022-12-07

## 2022-12-07 RX ORDER — DULAGLUTIDE 1.5 MG/.5ML
1.5 INJECTION, SOLUTION SUBCUTANEOUS WEEKLY
Qty: 12 ADJUSTABLE DOSE PRE-FILLED PEN SYRINGE | Refills: 0 | Status: SHIPPED | OUTPATIENT
Start: 2022-12-07

## 2022-12-28 ENCOUNTER — HOSPITAL ENCOUNTER (OUTPATIENT)
Dept: ULTRASOUND IMAGING | Age: 67
Discharge: HOME | End: 2022-12-28
Payer: COMMERCIAL

## 2022-12-28 DIAGNOSIS — N20.1 CALCULUS OF URETER: ICD-10-CM

## 2022-12-28 DIAGNOSIS — N20.0 CALCULUS OF KIDNEY: ICD-10-CM

## 2022-12-28 DIAGNOSIS — N23 UNSPECIFIED RENAL COLIC: ICD-10-CM

## 2022-12-28 PROCEDURE — 76770 US EXAM ABDO BACK WALL COMP: CPT

## 2023-01-09 DIAGNOSIS — E89.0 POSTABLATIVE HYPOTHYROIDISM: ICD-10-CM

## 2023-01-09 RX ORDER — LEVOTHYROXINE SODIUM 0.1 MG/1
TABLET ORAL
Qty: 90 TABLET | Refills: 0 | Status: SHIPPED | OUTPATIENT
Start: 2023-01-09 | End: 2023-03-06 | Stop reason: SDUPTHER

## 2023-02-25 ENCOUNTER — HOSPITAL ENCOUNTER (OUTPATIENT)
Age: 68
Discharge: HOME | End: 2023-02-25
Payer: COMMERCIAL

## 2023-02-25 DIAGNOSIS — E78.2 MIXED HYPERLIPIDEMIA: ICD-10-CM

## 2023-02-25 DIAGNOSIS — R74.8 ELEVATED ALKALINE PHOSPHATASE LEVEL: ICD-10-CM

## 2023-02-25 DIAGNOSIS — Z86.39 HISTORY OF HYPERTHYROIDISM: ICD-10-CM

## 2023-02-25 DIAGNOSIS — E04.2 MULTINODULAR GOITER: ICD-10-CM

## 2023-02-25 DIAGNOSIS — E89.0 POSTABLATIVE HYPOTHYROIDISM: ICD-10-CM

## 2023-02-25 DIAGNOSIS — I10 PRIMARY HYPERTENSION: ICD-10-CM

## 2023-02-25 LAB
A/G RATIO: 1.2 (ref 1.1–2.2)
ALBUMIN SERPL-MCNC: 3.6 G/DL (ref 3.4–5)
ALP BLD-CCNC: 155 U/L (ref 40–129)
ALT SERPL-CCNC: 7 U/L (ref 10–40)
ANION GAP SERPL CALCULATED.3IONS-SCNC: 11 MMOL/L (ref 3–16)
AST SERPL-CCNC: 17 U/L (ref 15–37)
BILIRUB SERPL-MCNC: 0.4 MG/DL (ref 0–1)
BUN BLDV-MCNC: 26 MG/DL (ref 7–20)
CALCIUM SERPL-MCNC: 9.4 MG/DL (ref 8.3–10.6)
CHLORIDE BLD-SCNC: 101 MMOL/L (ref 99–110)
CHOLESTEROL, TOTAL: 139 MG/DL (ref 0–199)
CO2: 29 MMOL/L (ref 21–32)
CREAT SERPL-MCNC: 1 MG/DL (ref 0.6–1.2)
GFR SERPL CREATININE-BSD FRML MDRD: >60 ML/MIN/{1.73_M2}
GLUCOSE BLD-MCNC: 61 MG/DL (ref 70–99)
HDLC SERPL-MCNC: 53 MG/DL (ref 40–60)
LDL CHOLESTEROL CALCULATED: 72 MG/DL
POTASSIUM SERPL-SCNC: 4.1 MMOL/L (ref 3.5–5.1)
SODIUM BLD-SCNC: 141 MMOL/L (ref 136–145)
T3 FREE: 2.4 PG/ML (ref 2.3–4.2)
T4 FREE: 1.4 NG/DL (ref 0.9–1.8)
TOTAL PROTEIN: 6.7 G/DL (ref 6.4–8.2)
TRIGL SERPL-MCNC: 69 MG/DL (ref 0–150)
TSH SERPL DL<=0.05 MIU/L-ACNC: 1.56 UIU/ML (ref 0.27–4.2)
VLDLC SERPL CALC-MCNC: 14 MG/DL

## 2023-02-25 PROCEDURE — 84439 ASSAY OF FREE THYROXINE: CPT

## 2023-02-25 PROCEDURE — 83036 HEMOGLOBIN GLYCOSYLATED A1C: CPT

## 2023-02-25 PROCEDURE — 80053 COMPREHEN METABOLIC PANEL: CPT

## 2023-02-25 PROCEDURE — 80061 LIPID PANEL: CPT

## 2023-02-25 PROCEDURE — 36415 COLL VENOUS BLD VENIPUNCTURE: CPT

## 2023-02-25 PROCEDURE — 84443 ASSAY THYROID STIM HORMONE: CPT

## 2023-02-25 PROCEDURE — 84481 FREE ASSAY (FT-3): CPT

## 2023-02-26 LAB
ESTIMATED AVERAGE GLUCOSE: 162.8 MG/DL
HBA1C MFR BLD: 7.3 %

## 2023-03-05 PROBLEM — E11.65 POORLY CONTROLLED TYPE 2 DIABETES MELLITUS WITH NEUROPATHY (HCC): Status: ACTIVE | Noted: 2023-03-05

## 2023-03-05 PROBLEM — E11.40 POORLY CONTROLLED TYPE 2 DIABETES MELLITUS WITH NEUROPATHY (HCC): Status: ACTIVE | Noted: 2023-03-05

## 2023-03-05 PROBLEM — E66.9 CLASS 2 OBESITY WITHOUT SERIOUS COMORBIDITY WITH BODY MASS INDEX (BMI) OF 39.0 TO 39.9 IN ADULT: Status: ACTIVE | Noted: 2023-03-05

## 2023-03-05 PROBLEM — E66.812 CLASS 2 OBESITY WITHOUT SERIOUS COMORBIDITY WITH BODY MASS INDEX (BMI) OF 39.0 TO 39.9 IN ADULT: Status: ACTIVE | Noted: 2023-03-05

## 2023-03-06 ENCOUNTER — OFFICE VISIT (OUTPATIENT)
Dept: ENDOCRINOLOGY | Age: 68
End: 2023-03-06
Payer: COMMERCIAL

## 2023-03-06 VITALS
HEART RATE: 65 BPM | RESPIRATION RATE: 14 BRPM | HEIGHT: 62 IN | TEMPERATURE: 98 F | BODY MASS INDEX: 39.38 KG/M2 | SYSTOLIC BLOOD PRESSURE: 140 MMHG | WEIGHT: 214 LBS | DIASTOLIC BLOOD PRESSURE: 73 MMHG | OXYGEN SATURATION: 98 %

## 2023-03-06 DIAGNOSIS — Z86.39 HISTORY OF HYPERTHYROIDISM: ICD-10-CM

## 2023-03-06 DIAGNOSIS — E66.9 CLASS 2 OBESITY WITHOUT SERIOUS COMORBIDITY WITH BODY MASS INDEX (BMI) OF 39.0 TO 39.9 IN ADULT, UNSPECIFIED OBESITY TYPE: ICD-10-CM

## 2023-03-06 DIAGNOSIS — R74.8 ELEVATED ALKALINE PHOSPHATASE LEVEL: ICD-10-CM

## 2023-03-06 DIAGNOSIS — E89.0 POSTABLATIVE HYPOTHYROIDISM: ICD-10-CM

## 2023-03-06 DIAGNOSIS — I10 PRIMARY HYPERTENSION: ICD-10-CM

## 2023-03-06 DIAGNOSIS — E78.2 MIXED HYPERLIPIDEMIA: ICD-10-CM

## 2023-03-06 DIAGNOSIS — E04.2 MULTINODULAR GOITER: ICD-10-CM

## 2023-03-06 DIAGNOSIS — E11.65 POORLY CONTROLLED TYPE 2 DIABETES MELLITUS WITH NEUROPATHY (HCC): Primary | ICD-10-CM

## 2023-03-06 DIAGNOSIS — E11.40 POORLY CONTROLLED TYPE 2 DIABETES MELLITUS WITH NEUROPATHY (HCC): Primary | ICD-10-CM

## 2023-03-06 PROCEDURE — 3078F DIAST BP <80 MM HG: CPT | Performed by: INTERNAL MEDICINE

## 2023-03-06 PROCEDURE — 1123F ACP DISCUSS/DSCN MKR DOCD: CPT | Performed by: INTERNAL MEDICINE

## 2023-03-06 PROCEDURE — 99214 OFFICE O/P EST MOD 30 MIN: CPT | Performed by: INTERNAL MEDICINE

## 2023-03-06 PROCEDURE — 3051F HG A1C>EQUAL 7.0%<8.0%: CPT | Performed by: INTERNAL MEDICINE

## 2023-03-06 PROCEDURE — 3077F SYST BP >= 140 MM HG: CPT | Performed by: INTERNAL MEDICINE

## 2023-03-06 RX ORDER — DULAGLUTIDE 1.5 MG/.5ML
INJECTION, SOLUTION SUBCUTANEOUS
Qty: 6 ML | Refills: 1 | Status: SHIPPED | OUTPATIENT
Start: 2023-03-06

## 2023-03-06 RX ORDER — GLIPIZIDE 10 MG/1
10 TABLET, FILM COATED, EXTENDED RELEASE ORAL 2 TIMES DAILY
Qty: 180 TABLET | Refills: 1 | Status: SHIPPED | OUTPATIENT
Start: 2023-03-06

## 2023-03-06 RX ORDER — LEVOTHYROXINE SODIUM 0.1 MG/1
TABLET ORAL
Qty: 90 TABLET | Refills: 1 | Status: SHIPPED | OUTPATIENT
Start: 2023-03-06

## 2023-03-06 NOTE — PROGRESS NOTES
Joanna Burns is a 79 y.o. female who presents for Type 2 diabetes mellitus. Current symptoms/problems include  hyperglycemia  and are worsening. 1.  Poorly controlled type 2 diabetes mellitus with neuropathy ([facility] [address] [E11.40, E11.65]     Diagnosed with Type 2 diabetes mellitus in 2012. Comorbid conditions: hyperlipidemia and Neuropathy     Current diabetic medications include: metformin  mg 4 tablets daily, glipizide XL 10 mg bid, Toujeo Max 60 units qhs     Had ovarian tumor removed and hysterectomy. Had kidney stones. Intolerance to diabetes medications: No  Had yeast infections. [place] may not be a good choice. Weight trend: stable  Prior visit with dietician: yes  Current diet: on average, 2-3 meals per day  Current exercise: walks     Current monitoring regimen: home blood tests - 2 times daily  Has brought blood glucose log/meter:  No  Home blood sugar records: fasting range:  and postprandial range: 100-140  Any episodes of hypoglycemia? Yes  Hypoglycemia frequency and time(s):  one episode in 3 months  Does patient have Glucagon emergency kit? No  Does patient have rapid acting carbohydrate? No  Does patient wear a medic alert bracelet or necklace? No     2. Mixed hyperlipidemia  No muscle pain. 3. Primary hypertension  No headaches. 4. Obesity  Tries to eat healthier. Lower carbs. 5. History of Hyperthyroidism      10.3 mCi 131 capsule was administered orally on on 07/12/2019. TSH <0.005, FT4 1.41  TSH low since 2017 2.19-0.18-0.051-<005  No palpitations, anxiety, had sleep problems for a long time. No family Hx of thyroid problems. 6.  Multinodular Goiter  No thyroid cancer in family. No radiation in her neck. 7. Elevated alkaline phosphatase level  No bone pain. 8. Hypothyroidism  Has fatigue, similar to before.        EXAMINATION:   THYROID ULTRASOUND       11/12/2022       COMPARISON:   10/03/2020, 02/21/2019       HISTORY:   ORDERING SYSTEM PROVIDED HISTORY: Multinodular goiter   TECHNOLOGIST PROVIDED HISTORY:   Reason for Exam: Multinodular Goiter   Additional signs and symptoms: thyroid iodine therapy ablation done 2019   Relevant Medical/Surgical History: follow up ultrasound       FINDINGS:   Right thyroid lobe:  Measures 4.2 x 1.4 x 1.7 cm       Left thyroid lobe:  Measures 3.6 x 1.0 x 1.8 cm       Isthmus:  Measures 2 mm       Thyroid Gland: The thyroid parenchyma is heterogeneous in echotexture,   similar to prior exams, and demonstrates normal internal vascularity. Nodules: No discrete thyroid nodule or mass is identified. Cervical lymphadenopathy: No abnormal lymph nodes in the imaged portions of   the neck. Impression   1. Stable heterogeneous echogenicity throughout the thyroid parenchyma,   unchanged from prior exams, possibly secondary to a chronic thyroiditis. 2. No discrete thyroid nodule or mass. EXAMINATION:   THYROID ULTRASOUND       10/3/2020       COMPARISON:   None. HISTORY:   ORDERING SYSTEM PROVIDED HISTORY: Multinodular goiter       FINDINGS:   Right thyroid lobe:  3.8 x 1.1 x 1.2 cm       Left thyroid lobe:  2.6 x 1 x 1.6 cm       Isthmus:  3 mm thickness       Thyroid Gland:  Thyroid gland demonstrates mildly diffusely heterogeneous   echotexture and unremarkable vascularity. Nodules: No thyroid nodules are present. Cervical lymphadenopathy: Patient describes palpable abnormality at the left   side of the neck corresponding to a normal appearing 7 x 9 x 13 mm lymph node   along the lateral margin of the thyroid gland. No abnormal lymph nodes in   the imaged portions of the neck. Impression   Unremarkable thyroid ultrasound.            Department of Pathology  FINAL CYTOLOGY REPORT  Patient Name:  Yusef Naranjo               Accession No:  [id number]   Age Sex:   1955    61 Y / F       Location:      [facility]  Account No:    [de-identified] Collected:     03/26/2019  Med Rec No:    [id number]                 Received:      03/26/2019  Attend Phys:   Odalys Santana MD            Completed:     03/27/2019  Perform Phys:  Odalys Santana MD    FINAL DIAGNOSIS:    Thyroid, Right Nodule Fine Needle Aspiration:     - Non-diangostic    COMMENT:    Insufficient well stained/preserved follicular cells present  for analysis (<6 groups of 10). KIR/LANA    CLINICAL DIAGNOSIS:    Diagnosis: E04.1    SPECIMEN:  THYROID, FNA: RIGHT THYROID NODULE 1.4X1.7 C.M.    GROSS DESCRIPTION:  Received are 10 smeared slides and a specimen in  cytolyt. KIR/KIR    MICROSCOPIC DESCRIPTION: Microscopic examination performed, including a  monolayer slide and H&E stained cell block section. CPT: 49737 X1   70805 X1    Case signed out at [address]  Specimen was processed and screened at [place] [address]      EXAMINATION:   I123 Thyroid Uptake and Scan. 3/13/2019 8:10 am       TECHNIQUE:   318 microcuries I123 sodium iodide was administered p. o. Then, thyroid   uptake measurements were performed at 4 and 24 hours. Pinhole images of the   thyroid were obtained at 4 hours. COMPARISON:   Ultrasound 02/21/2019       HISTORY:   ORDERING SYSTEM PROVIDED HISTORY: Hyperthyroidism   TECHNOLOGIST PROVIDED HISTORY:   Ordering Physician Provided Reason for Exam: Hyperthyroidism; Multinodular   Goiter   Acuity: Unknown   Type of Exam: Initial       FINDINGS:   Thyroid uptake at 4 hours measured 29%. Normal range at 4-6 hours is 6-18%. Thyroid uptake at 24 hours measured 54%. Normal range at 24 hours is 10-35%. There is heterogeneous distribution of radiotracer, asymmetrically increased   in the right thyroid lobe compared to the left. There is a dominant nodule   in the right thyroid lobe on the recent ultrasound. Impression   1. Elevated 24 hour uptake of 54%. Increased uptake corresponds to the   dominant nodule in the right thyroid lobe with heterogeneous uptake in the   left thyroid lobe. This could represent multinodular goiter or possibly a   nodular form of Graves disease.                Past Medical History:   Diagnosis Date    Class 2 obesity with body mass index (BMI) of 38.0 to 38.9 in adult 2/13/2019    Hyperlipidemia     Hypertension     Kidney stone     Ovarian mass     Ovarian tumor     Thyroid disease     Type II or unspecified type diabetes mellitus without mention of complication, not stated as uncontrolled       Patient Active Problem List   Diagnosis    HTN (hypertension)    Mixed hyperlipidemia    Abnormal EKG    History of hyperthyroidism    Elevated alkaline phosphatase level    Multinodular goiter    Postablative hypothyroidism    Class 3 severe obesity with body mass index (BMI) of 40.0 to 44.9 in adult [facility])    Optic neuropathy, ischemic, arteritic, right    Class 2 obesity with body mass index (BMI) of 39.0 to 39.9 in adult    Primary hypertension    Class 3 severe obesity with serious comorbidity and body mass index (BMI) of 40.0 to 44.9 in adult [facility])    Poorly controlled type 2 diabetes mellitus with neuropathy (HCC)    Class 2 obesity without serious comorbidity with body mass index (BMI) of 39.0 to 39.9 in adult     Past Surgical History:   Procedure Laterality Date    HYSTERECTOMY (CERVIX STATUS UNKNOWN)      OVARY REMOVAL Bilateral     SALPINGO-OOPHORECTOMY N/A 12/10/2019    ROBOTIC ASSISTED OPERATIVE LAPAROSCOPY, BILATERAL SALPINGO-OOPHORECTOMY, DERMOID CYST IN LEFT OVARY, LYSIS OF ADHESIONS performed by Marilia Patton MD at [facility] History     Socioeconomic History    Marital status: Single     Spouse name: Not on file    Number of children: Not on file    Years of education: Not on file    Highest education level: Not on file   Occupational History    Not on file Tobacco Use    Smoking status: Never    Smokeless tobacco: Never   Vaping Use    Vaping Use: Never used   Substance and Sexual Activity    Alcohol use: No    Drug use: No    Sexual activity: Never   Other Topics Concern    Not on file   Social History Narrative    Not on file     Social Determinants of Health     Financial Resource Strain: Not on file   Food Insecurity: Not on file   Transportation Needs: Not on file   Physical Activity: Not on file   Stress: Not on file   Social Connections: Not on file   Intimate Partner Violence: Not on file   Housing Stability: Not on file     Family History   Problem Relation Age of Onset    Heart Disease Mother     Diabetes Mother     Diabetes Father     COPD Father     Diabetes Maternal Aunt     Heart Disease Brother     Cancer Sister      Current Outpatient Medications   Medication Sig Dispense Refill    TRULICITY 1.5 BR/1.0MI SC injection INJECT 0.5 ML UNDER THE SKIN ONCE A WEEK 6 mL 0    levothyroxine (SYNTHROID) 100 MCG tablet TAKE 1 TABLET DAILY 90 tablet 0    TOUJEO MAX SOLOSTAR 300 UNIT/ML SOPN Inject 60 Units into the skin nightly      metFORMIN (GLUCOPHAGE XR) 500 MG extended release tablet Take 4 tablets by mouth daily 360 tablet 1    glipiZIDE (GLUCOTROL XL) 10 MG extended release tablet Take 1 tablet by mouth 2 times daily 180 tablet 1    Potassium Citrate ER 15 MEQ (1620 MG) TBCR Take by mouth      atorvastatin (LIPITOR) 40 MG tablet Take 1 tablet by mouth daily 90 tablet 3    allopurinol (ZYLOPRIM) 300 MG tablet Take 1 tablet by mouth daily      Cholecalciferol (VITAMIN D3) 5000 units TABS Take one tab qod 30 tablet 0    furosemide (LASIX) 20 MG tablet Take 20 mg by mouth as needed Patient takes PRN      ibuprofen (ADVIL;MOTRIN) 200 MG tablet Take 200 mg by mouth every 6 hours as needed for Pain      lisinopril-hydrochlorothiazide (PRINZIDE;ZESTORETIC) 20-25 MG per tablet Take 1 tablet by mouth daily. aspirin 81 MG tablet Take 81 mg by mouth daily. Continuous Blood Gluc Sensor (FREESTYLE GELY 2 SENSOR) MISC Change sensor q 14 days (Patient not taking: Reported on 3/6/2023) 6 each 1    Continuous Blood Gluc  (FREESTYLE GELY 2 READER) GERALDINE Use to monitor BG (Patient not taking: Reported on 3/6/2023) 1 each 0     No current facility-administered medications for this visit.      No Known Allergies  Family Status   Relation Name Status    Mother      Father      [address]  (Not Specified)    Sister  [de-identified]    Brother  Alive    Sister         Lab Review:    Lab Results   Component Value Date/Time    WBC 8.9 05/15/2020 08:47 AM    HGB 11.7 05/15/2020 08:47 AM    HCT 35.6 05/15/2020 08:47 AM    MCV 78.2 05/15/2020 08:47 AM     05/15/2020 08:47 AM     Lab Results   Component Value Date/Time     2023 10:24 AM    K 4.1 2023 10:24 AM    K 3.3 2019 09:39 AM     2023 10:24 AM    CO2 29 2023 10:24 AM    BUN 26 2023 10:24 AM    CREATININE 1.0 2023 10:24 AM    GLUCOSE 61 2023 10:24 AM    CALCIUM 9.4 2023 10:24 AM    PROT 6.7 2023 10:24 AM    LABALBU 3.6 2023 10:24 AM    BILITOT 0.4 2023 10:24 AM    ALKPHOS 155 2023 10:24 AM    AST 17 2023 10:24 AM    ALT 7 2023 10:24 AM    LABGLOM >60 2023 10:24 AM    GFRAA >60 2022 11:21 AM    AGRATIO 1.2 2023 10:24 AM    GLOB 3.2 2021 10:34 AM     Lab Results   Component Value Date/Time    TSH 1.56 2023 10:24 AM    FT3 2.4 2023 10:24 AM     Lab Results   Component Value Date/Time    LABA1C 7.3 2023 10:24 AM     Lab Results   Component Value Date/Time    .8 2023 10:24 AM     Lab Results   Component Value Date/Time    CHOL 139 2023 10:24 AM     Lab Results   Component Value Date/Time    TRIG 69 2023 10:24 AM     Lab Results   Component Value Date/Time    HDL 53 2023 10:24 AM     Lab Results   Component Value Date/Time    LDLCALC 72 02/25/2023 10:24 AM     Lab Results   Component Value Date/Time    LABVLDL 14 02/25/2023 10:24 AM     No results found for: CHOLHDLRATIO  Lab Results   Component Value Date/Time    LABMICR 1.90 08/20/2022 10:53 AM    LABMICR YES 12/06/2019 11:55 AM     Lab Results   Component Value Date/Time    VITD25 55.6 04/05/2022 08:19 AM        Review of Systems:  Constitutional: has fatigue, no fever, no recent weight gain, has recent weight loss, has changes in appetite  Eyes: no eye pain, no change in vision, no eye redness, no eye irritation, no double vision  Ears, nose, throat: no nasal congestion, no sore throat, no earache, no decrease in hearing, no hoarseness, no dry mouth, no sinus problems, no difficulty swallowing, no neck lumps, no dental problems, no mouth sores, no ringing in ears  Pulmonary: no shortness of breath, no wheezing, no dyspnea on exertion, no cough  Cardiovascular: no chest pain, has lower extremity edema, no orthopnea, no intermittent leg claudication, no palpitations  Gastrointestinal: no abdominal pain, no nausea, no vomiting, no diarrhea, no constipation, no dysphagia, no heartburn, no bloating  Genitourinary: no dysuria, no urinary incontinence, no urinary hesitancy, no urinary frequency, no feelings of urinary urgency, no nocturia  Musculoskeletal: no joint swelling, no joint stiffness, has joint pain, no muscle cramps, no muscle pain, no bone pain  Integument/Breast: no hair loss, no skin rashes, no skin lesions, no itching, no dry skin  Neurological: no numbness, no tingling, no weakness, no confusion, no headaches, no dizziness, no fainting, no tremors, no decrease in memory, no balance problems  Psychiatric: no anxiety, no depression, has insomnia  Hematologic/Lymphatic: no tendency for easy bleeding, no swollen lymph nodes, has tendency for easy bruising  Immunology: no seasonal allergies, no frequent infections, no frequent illnesses  Endocrine: no temperature intolerance    BP (!) 140/73   Pulse 65   Temp 98 °F (36.7 °C)   Resp 14   Ht 5' 2\" (1.575 m)   Wt 214 lb (97.1 kg)   LMP  (LMP Unknown)   SpO2 98%   BMI 39.14 kg/m²    Wt Readings from Last 3 Encounters:   03/06/23 214 lb (97.1 kg)   08/23/22 218 lb (98.9 kg)   05/12/22 221 lb (100.2 kg)     Body mass index is 39.14 kg/m².       OBJECTIVE:  Constitutional: no acute distress, well appearing and well nourished  Psychiatric: oriented to person, place and time, judgement and insight and normal, recent and remote memory and intact and mood and affect are normal  Skin: skin and subcutaneous tissue is normal without mass, normal turgor  Head and Face: examination of head and face revealed no abnormalities  Eyes: no lid or conjunctival swelling, erythema or discharge, pupils are normal, equal, round, reactive to light  Ears/Nose: external inspection of ears and nose revealed no abnormalities, hearing is grossly normal  Oropharynx/Mouth/Face: lips, tongue and gums are normal with no lesions, the voice quality was normal  Neck: neck is supple and symmetric, with midline trachea and no masses, thyroid is normal  Lymphatics: normal cervical lymph nodes, normal supraclavicular nodes  Pulmonary: no increased work of breathing or signs of respiratory distress, lungs are clear to auscultation  Cardiovascular: normal heart rate and rhythm, normal S1 and S2, no murmurs and pedal pulses and 2+ bilaterally, 1+ edema  Abdomen: abdomen is soft, non-tender with no masses  Musculoskeletal: normal gait and station and exam of the digits and nails are normal  Neurological: normal coordination and normal general cortical function, has mild hand tremor    Visual inspection:  Deformity/amputation: absent  Skin lesions/pre-ulcerative calluses: present calluses  Edema: right- negative, left- negative     Sensory exam:  Monofilament sensation: normal  (minimum of 5 random plantar locations tested, avoiding callused areas - > 1 area with absence of sensation is + for neuropathy)     Plus at least one of the following:  Pulses: normal  Proprioception: Intact  Vibration (128 Hz): Impaired    ASSESSMENT/PLAN:  1. DM type 2, poorly controlled, with neuropathy ([address]  Put Cindi to evaluate glycemic pattern  Hemoglobin A1c 8.7-7.8-7.2-7.1-7.2-8.3-8.6-8.5-8.7-8.8-8.7-8.5-8.8-7.3  Uncontrolled. Metformin  mg 2 tabs bid  Glipizide 10 mg ER bid  Toujeo Max 60 units qhs  Trulicity 1.5 mg weekly  - Comprehensive Metabolic Panel; Future  - Hemoglobin A1C; Future  - Microalbumin / Creatinine Urine Ratio; Future  Having Urology evaluation     2. Mixed hyperlipidemia  LDL 07-40-57-59-14-14-13-62--24  - Lipid Panel; Future     3. Primary hypertension  Continue current treatment     4. Obesity  Diet, exercise. 5. History of Hyperthyroidism  Had I-131 Tx.  TSH receptor antibody and TSI positive. Also positive TPO antibody. 10.3 mCi 131 capsule was administered orally on on 07/12/2019.       6. Elevated alkaline phosphatase level  Normal PTH, normal calcium. Alkaline phosphatase, elevated - liver fraction. Follow with PCP  Alk phos [phone]  Calcium 9.6  - Alkaline Phosphatase, Isoenzymes; Future  - PTH, Intact; Future  - Vitamin D 25 Hydroxy; Future  - Phosphorus; Future  Vitamin D to 5000 IU qod.  25 hydroxy vitamin D 53-50-56-55.6     7. Multinodular Goiter  10/2020, 11/2022 thyroid nodule not seen  Repeated FNA 6/2019 by Dr. Cristy Potts. Right 1.7 cm nodule nondiagnostic  Thyroid, Right Nodule Fine Needle Aspiration:     - Non-diagnostic  - US Head Neck Soft Tissue Thyroid; Future     8.  Hypothyroidism, postablative  Continue levothyroxine 0.1 mg daily  TSH 4.74-18.8-4.07-2.97-1.96-4.31-1.97-2.44-3.05-2.21-3.55-2.6  -TSH  - T3, Free; Future  - T4, Free; Future      Reviewed and/or ordered clinical lab results Yes  Reviewed and/or ordered radiology tests Yes  Reviewed and/or ordered other     Reviewed and/or ordered clinical lab results Yes  Reviewed and/or ordered radiology tests Yes  Reviewed and/or ordered other diagnostic tests No  Discussed test results with performing physician No  Independently reviewed image, tracing, or specimen Yes, thyroid uptake and scan  Made a decision to obtain old records No  Reviewed and summarized old records Yes  TSH less than 0.005, PTH 30, FT4 1.41, ionized calcium 1.13, hemoglobin 11.8, hemoglobin A1c 8.3, LDL cholesterol 85, glucose 104, alkaline phosphatase 212.  Obtained history from other than patient No    Mckaylarowan Chavez was counseled regarding symptoms of diabetes, elevated alk phos, hyperthyroidism diagnosis, course and complications of disease if inadequately treated, side effects of medications, diagnosis, treatment options, and prognosis, risks, benefits, complications, and alternatives of treatment, labs, imaging and other studies and treatment targets and goals, insulin, medications, hyperthyroid treatment, I-131 Tx, permanent hypothyroidism, CGM.  She understands instructions and counseling.      Return in about 4 months (around 7/6/2023) for diabetes.

## 2023-04-19 RX ORDER — ATORVASTATIN CALCIUM 40 MG/1
TABLET, FILM COATED ORAL
Qty: 90 TABLET | Refills: 0 | Status: SHIPPED | OUTPATIENT
Start: 2023-04-19

## 2023-07-08 ENCOUNTER — HOSPITAL ENCOUNTER (OUTPATIENT)
Age: 68
Discharge: HOME | End: 2023-07-08
Payer: COMMERCIAL

## 2023-07-08 DIAGNOSIS — E04.2 MULTINODULAR GOITER: ICD-10-CM

## 2023-07-08 DIAGNOSIS — E89.0 POSTABLATIVE HYPOTHYROIDISM: ICD-10-CM

## 2023-07-08 DIAGNOSIS — E78.2 MIXED HYPERLIPIDEMIA: ICD-10-CM

## 2023-07-08 DIAGNOSIS — Z86.39 HISTORY OF HYPERTHYROIDISM: ICD-10-CM

## 2023-07-08 DIAGNOSIS — E11.40 POORLY CONTROLLED TYPE 2 DIABETES MELLITUS WITH NEUROPATHY (HCC): ICD-10-CM

## 2023-07-08 DIAGNOSIS — E11.65 POORLY CONTROLLED TYPE 2 DIABETES MELLITUS WITH NEUROPATHY (HCC): ICD-10-CM

## 2023-07-08 DIAGNOSIS — R74.8 ELEVATED ALKALINE PHOSPHATASE LEVEL: ICD-10-CM

## 2023-07-08 LAB
25(OH)D3 SERPL-MCNC: 58.8 NG/ML
ALBUMIN SERPL-MCNC: 3.8 G/DL (ref 3.4–5)
ALBUMIN/GLOB SERPL: 1.3 {RATIO} (ref 1.1–2.2)
ALP SERPL-CCNC: 143 U/L (ref 40–129)
ALT SERPL-CCNC: 7 U/L (ref 10–40)
ANION GAP SERPL CALCULATED.3IONS-SCNC: 10 MMOL/L (ref 3–16)
AST SERPL-CCNC: 19 U/L (ref 15–37)
BILIRUB SERPL-MCNC: 0.3 MG/DL (ref 0–1)
BUN SERPL-MCNC: 26 MG/DL (ref 7–20)
CALCIUM SERPL-MCNC: 9.4 MG/DL (ref 8.3–10.6)
CHLORIDE SERPL-SCNC: 103 MMOL/L (ref 99–110)
CHOLEST SERPL-MCNC: 133 MG/DL (ref 0–199)
CO2 SERPL-SCNC: 29 MMOL/L (ref 21–32)
CREAT SERPL-MCNC: 0.9 MG/DL (ref 0.6–1.2)
GFR SERPLBLD CREATININE-BSD FMLA CKD-EPI: >60 ML/MIN/{1.73_M2}
GLUCOSE SERPL-MCNC: 64 MG/DL (ref 70–99)
HDLC SERPL-MCNC: 56 MG/DL (ref 40–60)
LDL CHOLESTEROL CALCULATED: 63 MG/DL
POTASSIUM SERPL-SCNC: 4.5 MMOL/L (ref 3.5–5.1)
PROT SERPL-MCNC: 6.8 G/DL (ref 6.4–8.2)
SODIUM SERPL-SCNC: 142 MMOL/L (ref 136–145)
T4 FREE SERPL-MCNC: 1.5 NG/DL (ref 0.9–1.8)
TRIGL SERPL-MCNC: 69 MG/DL (ref 0–150)
TSH SERPL DL<=0.005 MIU/L-ACNC: 1.56 UIU/ML (ref 0.27–4.2)
VLDLC SERPL CALC-MCNC: 14 MG/DL

## 2023-07-08 PROCEDURE — 84439 ASSAY OF FREE THYROXINE: CPT

## 2023-07-08 PROCEDURE — 83036 HEMOGLOBIN GLYCOSYLATED A1C: CPT

## 2023-07-08 PROCEDURE — 82306 VITAMIN D 25 HYDROXY: CPT

## 2023-07-08 PROCEDURE — 36415 COLL VENOUS BLD VENIPUNCTURE: CPT

## 2023-07-08 PROCEDURE — 80053 COMPREHEN METABOLIC PANEL: CPT

## 2023-07-08 PROCEDURE — 80061 LIPID PANEL: CPT

## 2023-07-08 PROCEDURE — 84443 ASSAY THYROID STIM HORMONE: CPT

## 2023-07-09 LAB
EST. AVERAGE GLUCOSE BLD GHB EST-MCNC: 174.3 MG/DL
HBA1C MFR BLD: 7.7 %

## 2023-07-13 ENCOUNTER — OFFICE VISIT (OUTPATIENT)
Dept: ENDOCRINOLOGY | Age: 68
End: 2023-07-13
Payer: COMMERCIAL

## 2023-07-13 VITALS
BODY MASS INDEX: 39.56 KG/M2 | HEIGHT: 62 IN | DIASTOLIC BLOOD PRESSURE: 87 MMHG | WEIGHT: 215 LBS | SYSTOLIC BLOOD PRESSURE: 126 MMHG | OXYGEN SATURATION: 98 % | TEMPERATURE: 98 F | RESPIRATION RATE: 14 BRPM | HEART RATE: 74 BPM

## 2023-07-13 DIAGNOSIS — E78.2 MIXED HYPERLIPIDEMIA: ICD-10-CM

## 2023-07-13 DIAGNOSIS — E66.9 CLASS 2 OBESITY WITHOUT SERIOUS COMORBIDITY WITH BODY MASS INDEX (BMI) OF 39.0 TO 39.9 IN ADULT, UNSPECIFIED OBESITY TYPE: ICD-10-CM

## 2023-07-13 DIAGNOSIS — E11.40 POORLY CONTROLLED TYPE 2 DIABETES MELLITUS WITH NEUROPATHY (HCC): Primary | ICD-10-CM

## 2023-07-13 DIAGNOSIS — E89.0 POSTABLATIVE HYPOTHYROIDISM: ICD-10-CM

## 2023-07-13 DIAGNOSIS — E04.2 MULTINODULAR GOITER: ICD-10-CM

## 2023-07-13 DIAGNOSIS — E11.65 POORLY CONTROLLED TYPE 2 DIABETES MELLITUS WITH NEUROPATHY (HCC): Primary | ICD-10-CM

## 2023-07-13 DIAGNOSIS — I10 PRIMARY HYPERTENSION: ICD-10-CM

## 2023-07-13 DIAGNOSIS — Z86.39 HISTORY OF HYPERTHYROIDISM: ICD-10-CM

## 2023-07-13 DIAGNOSIS — R74.8 ELEVATED ALKALINE PHOSPHATASE LEVEL: ICD-10-CM

## 2023-07-13 PROCEDURE — 99214 OFFICE O/P EST MOD 30 MIN: CPT | Performed by: INTERNAL MEDICINE

## 2023-07-13 PROCEDURE — 3074F SYST BP LT 130 MM HG: CPT | Performed by: INTERNAL MEDICINE

## 2023-07-13 PROCEDURE — 1123F ACP DISCUSS/DSCN MKR DOCD: CPT | Performed by: INTERNAL MEDICINE

## 2023-07-13 PROCEDURE — 3079F DIAST BP 80-89 MM HG: CPT | Performed by: INTERNAL MEDICINE

## 2023-07-13 PROCEDURE — 3051F HG A1C>EQUAL 7.0%<8.0%: CPT | Performed by: INTERNAL MEDICINE

## 2023-07-13 RX ORDER — DULAGLUTIDE 3 MG/.5ML
3 INJECTION, SOLUTION SUBCUTANEOUS WEEKLY
Qty: 6 ML | Refills: 1 | Status: SHIPPED | OUTPATIENT
Start: 2023-07-13

## 2023-07-13 RX ORDER — LEVOTHYROXINE SODIUM 0.1 MG/1
TABLET ORAL
Qty: 90 TABLET | Refills: 1 | Status: SHIPPED | OUTPATIENT
Start: 2023-07-13

## 2023-07-13 RX ORDER — DULAGLUTIDE 1.5 MG/.5ML
INJECTION, SOLUTION SUBCUTANEOUS
Qty: 6 ML | Refills: 1 | Status: CANCELLED | OUTPATIENT
Start: 2023-07-13

## 2023-07-13 RX ORDER — GLIPIZIDE 10 MG/1
10 TABLET, FILM COATED, EXTENDED RELEASE ORAL 2 TIMES DAILY
Qty: 180 TABLET | Refills: 1 | Status: SHIPPED | OUTPATIENT
Start: 2023-07-13

## 2023-07-13 RX ORDER — METFORMIN HYDROCHLORIDE 500 MG/1
2000 TABLET, EXTENDED RELEASE ORAL DAILY
Qty: 360 TABLET | Refills: 1 | Status: SHIPPED | OUTPATIENT
Start: 2023-07-13

## 2023-07-13 NOTE — PROGRESS NOTES
Loraine Torres is a 79 y.o. female who presents for Type 2 diabetes mellitus. Current symptoms/problems include  hyperglycemia  and are worsening. 1.  Poorly controlled type 2 diabetes mellitus with neuropathy ([address]) [E11.40, E11.65]     Diagnosed with Type 2 diabetes mellitus in 2012. Comorbid conditions: hyperlipidemia and Neuropathy     Current diabetic medications include: metformin  mg 4 tablets daily, glipizide XL 10 mg bid, Toujeo Max 60 units qhs     Had ovarian tumor removed and hysterectomy. Had kidney stones. Intolerance to diabetes medications: No  Had yeast infections. [facility] may not be a good choice. Weight trend: stable  Prior visit with dietician: yes  Current diet: on average, 2-3 meals per day  Current exercise: walks     Current monitoring regimen: home blood tests - 2 times daily  Has brought blood glucose log/meter:  No  Home blood sugar records: fasting range:  and postprandial range: 100-140  Any episodes of hypoglycemia? Yes  Hypoglycemia frequency and time(s):  one episode in 3 months  Does patient have Glucagon emergency kit? No  Does patient have rapid acting carbohydrate? No  Does patient wear a medic alert bracelet or necklace? No     2. Mixed hyperlipidemia  No muscle pain. 3. Primary hypertension  No headaches. 4. Obesity  Tries to eat healthier. Lower carbs. 5. History of Hyperthyroidism      10.3 mCi 131 capsule was administered orally on on 07/12/2019. TSH <0.005, FT4 1.41  TSH low since 2017 2.19-0.18-0.051-<005  No palpitations, anxiety, had sleep problems for a long time. No family Hx of thyroid problems. 6.  Multinodular Goiter  No thyroid cancer in family. No radiation in her neck. 7. Elevated alkaline phosphatase level  No bone pain. 8. Hypothyroidism  Has fatigue, similar to before.        EXAMINATION:   THYROID ULTRASOUND       11/12/2022       COMPARISON:   10/03/2020, 02/21/2019       HISTORY:   ORDERING

## 2023-07-18 RX ORDER — ATORVASTATIN CALCIUM 40 MG/1
TABLET, FILM COATED ORAL
Qty: 90 TABLET | Refills: 3 | Status: SHIPPED | OUTPATIENT
Start: 2023-07-18

## 2023-08-08 NOTE — PROGRESS NOTES
[facility]   Cardiac Evaluation      Patient: Cesario Cherry  YOB: 1955  Date: 23       Chief Complaint   Patient presents with    Hypertension    Hyperlipidemia        Referring provider: Tomas MCCRARY    History of Present Illness:   Ms Ilda Zamarripa is seen today in follow up. She was previously seen in 2014 for abnormal EKG findings. She had presented to pcp for pre-op clearance for EGD and EKG was performed. Nita Norman has a history of hypertension, hyperlipidemia, and diabetes, hyperthyroidism. She does not smoke or drink alcohol. Nita Norman is a  at [employer]. Surgeries include hysterectomy. Family history of mother having had CAD, is  ( after open heart at [facility]). Ms Ilda Zamarripa had been following with Dr Annella Ganser, neurologist, for shadow/floater in her eye. She was advised to have an echo for ? Small vessel disease w/ optic arteritis and ? TIA. She was given plavix and asa daily. Today, Ms Ilda Zamarripa states she has been feeling ok. She denies chest pain, palpitations, dizziness, or edema. She has shortness of breath with steep hills. Nita Norman does not do regular exercise. She does not sleep well, never has and she snores. Past Medical History:   has a past medical history of Class 2 obesity with body mass index (BMI) of 38.0 to 38.9 in adult, Hyperlipidemia, Hypertension, Kidney stone, Ovarian mass, Ovarian tumor, Thyroid disease, and Type II or unspecified type diabetes mellitus without mention of complication, not stated as uncontrolled. Surgical History:   has a past surgical history that includes Hysterectomy; [place] tooth extraction; Salpingo-oophorectomy (N/A, 12/10/2019); and Ovary removal (Bilateral). Social History:  History     Social History    Marital Status: Single     Spouse Name: N/A     Number of Children: N/A    Years of Education: N/A     Occupational History     at [employer].      Social History Main Topics    Smoking status: Never

## 2023-08-16 ENCOUNTER — OFFICE VISIT (OUTPATIENT)
Dept: CARDIOLOGY CLINIC | Age: 68
End: 2023-08-16
Payer: COMMERCIAL

## 2023-08-16 VITALS
HEART RATE: 72 BPM | OXYGEN SATURATION: 97 % | BODY MASS INDEX: 39.2 KG/M2 | WEIGHT: 213 LBS | SYSTOLIC BLOOD PRESSURE: 124 MMHG | DIASTOLIC BLOOD PRESSURE: 70 MMHG | HEIGHT: 62 IN

## 2023-08-16 DIAGNOSIS — I10 PRIMARY HYPERTENSION: Primary | ICD-10-CM

## 2023-08-16 DIAGNOSIS — R06.09 DOE (DYSPNEA ON EXERTION): ICD-10-CM

## 2023-08-16 DIAGNOSIS — E78.2 MIXED HYPERLIPIDEMIA: ICD-10-CM

## 2023-08-16 PROCEDURE — 3078F DIAST BP <80 MM HG: CPT | Performed by: INTERNAL MEDICINE

## 2023-08-16 PROCEDURE — 1123F ACP DISCUSS/DSCN MKR DOCD: CPT | Performed by: INTERNAL MEDICINE

## 2023-08-16 PROCEDURE — 3074F SYST BP LT 130 MM HG: CPT | Performed by: INTERNAL MEDICINE

## 2023-08-16 PROCEDURE — 93000 ELECTROCARDIOGRAM COMPLETE: CPT | Performed by: INTERNAL MEDICINE

## 2023-08-16 PROCEDURE — 99214 OFFICE O/P EST MOD 30 MIN: CPT | Performed by: INTERNAL MEDICINE

## 2023-09-18 ENCOUNTER — PROCEDURE VISIT (OUTPATIENT)
Dept: CARDIOLOGY CLINIC | Age: 68
End: 2023-09-18
Payer: COMMERCIAL

## 2023-09-18 DIAGNOSIS — E78.2 MIXED HYPERLIPIDEMIA: ICD-10-CM

## 2023-09-18 DIAGNOSIS — R06.09 DOE (DYSPNEA ON EXERTION): ICD-10-CM

## 2023-09-18 DIAGNOSIS — I10 PRIMARY HYPERTENSION: ICD-10-CM

## 2023-09-18 PROCEDURE — 93325 DOPPLER ECHO COLOR FLOW MAPG: CPT | Performed by: INTERNAL MEDICINE

## 2023-09-18 PROCEDURE — 93351 STRESS TTE COMPLETE: CPT | Performed by: INTERNAL MEDICINE

## 2023-12-11 DIAGNOSIS — E11.40 POORLY CONTROLLED TYPE 2 DIABETES MELLITUS WITH NEUROPATHY (HCC): ICD-10-CM

## 2023-12-11 DIAGNOSIS — E11.65 POORLY CONTROLLED TYPE 2 DIABETES MELLITUS WITH NEUROPATHY (HCC): ICD-10-CM

## 2023-12-11 RX ORDER — DULAGLUTIDE 3 MG/.5ML
INJECTION, SOLUTION SUBCUTANEOUS
Qty: 6 ML | Refills: 1 | Status: SHIPPED | OUTPATIENT
Start: 2023-12-11

## 2024-01-13 ENCOUNTER — HOSPITAL ENCOUNTER (OUTPATIENT)
Age: 69
Discharge: HOME | End: 2024-01-13
Payer: COMMERCIAL

## 2024-01-13 DIAGNOSIS — Z86.39 HISTORY OF HYPERTHYROIDISM: ICD-10-CM

## 2024-01-13 DIAGNOSIS — E04.2 MULTINODULAR GOITER: ICD-10-CM

## 2024-01-13 DIAGNOSIS — E11.40 POORLY CONTROLLED TYPE 2 DIABETES MELLITUS WITH NEUROPATHY (HCC): ICD-10-CM

## 2024-01-13 DIAGNOSIS — E78.2 MIXED HYPERLIPIDEMIA: ICD-10-CM

## 2024-01-13 DIAGNOSIS — I10 PRIMARY HYPERTENSION: ICD-10-CM

## 2024-01-13 DIAGNOSIS — E11.65 POORLY CONTROLLED TYPE 2 DIABETES MELLITUS WITH NEUROPATHY (HCC): ICD-10-CM

## 2024-01-13 LAB
25(OH)D3 SERPL-MCNC: 56.6 NG/ML
ALBUMIN SERPL-MCNC: 3.8 G/DL (ref 3.4–5)
ALBUMIN/GLOB SERPL: 1.4 {RATIO} (ref 1.1–2.2)
ALP SERPL-CCNC: 145 U/L (ref 40–129)
ALT SERPL-CCNC: 6 U/L (ref 10–40)
ANION GAP SERPL CALCULATED.3IONS-SCNC: 12 MMOL/L (ref 3–16)
AST SERPL-CCNC: 14 U/L (ref 15–37)
BILIRUB SERPL-MCNC: 0.3 MG/DL (ref 0–1)
BUN SERPL-MCNC: 31 MG/DL (ref 7–20)
CALCIUM SERPL-MCNC: 8.8 MG/DL (ref 8.3–10.6)
CHLORIDE SERPL-SCNC: 100 MMOL/L (ref 99–110)
CHOLEST SERPL-MCNC: 124 MG/DL (ref 0–199)
CO2 SERPL-SCNC: 26 MMOL/L (ref 21–32)
CREAT SERPL-MCNC: 0.9 MG/DL (ref 0.6–1.2)
GFR SERPLBLD CREATININE-BSD FMLA CKD-EPI: >60 ML/MIN/{1.73_M2}
GLUCOSE SERPL-MCNC: 62 MG/DL (ref 70–99)
HDLC SERPL-MCNC: 53 MG/DL (ref 40–60)
LDL CHOLESTEROL CALCULATED: 57 MG/DL
POTASSIUM SERPL-SCNC: 4.1 MMOL/L (ref 3.5–5.1)
PROT SERPL-MCNC: 6.5 G/DL (ref 6.4–8.2)
SODIUM SERPL-SCNC: 138 MMOL/L (ref 136–145)
T3FREE SERPL-MCNC: 2.1 PG/ML (ref 2.3–4.2)
T4 FREE SERPL-MCNC: 1.4 NG/DL (ref 0.9–1.8)
TRIGL SERPL-MCNC: 71 MG/DL (ref 0–150)
TSH SERPL DL<=0.005 MIU/L-ACNC: 3.13 UIU/ML (ref 0.27–4.2)
VLDLC SERPL CALC-MCNC: 14 MG/DL

## 2024-01-13 PROCEDURE — 83036 HEMOGLOBIN GLYCOSYLATED A1C: CPT

## 2024-01-13 PROCEDURE — 80053 COMPREHEN METABOLIC PANEL: CPT

## 2024-01-13 PROCEDURE — 84481 FREE ASSAY (FT-3): CPT

## 2024-01-13 PROCEDURE — 80061 LIPID PANEL: CPT

## 2024-01-13 PROCEDURE — 36415 COLL VENOUS BLD VENIPUNCTURE: CPT

## 2024-01-13 PROCEDURE — 84443 ASSAY THYROID STIM HORMONE: CPT

## 2024-01-13 PROCEDURE — 82306 VITAMIN D 25 HYDROXY: CPT

## 2024-01-13 PROCEDURE — 84439 ASSAY OF FREE THYROXINE: CPT

## 2024-01-14 LAB
EST. AVERAGE GLUCOSE BLD GHB EST-MCNC: 154.2 MG/DL
HBA1C MFR BLD: 7 %

## 2024-01-18 ENCOUNTER — OFFICE VISIT (OUTPATIENT)
Dept: ENDOCRINOLOGY | Age: 69
End: 2024-01-18
Payer: COMMERCIAL

## 2024-01-18 VITALS
OXYGEN SATURATION: 98 % | HEART RATE: 66 BPM | SYSTOLIC BLOOD PRESSURE: 162 MMHG | HEIGHT: 62 IN | DIASTOLIC BLOOD PRESSURE: 75 MMHG | BODY MASS INDEX: 39.01 KG/M2 | RESPIRATION RATE: 14 BRPM | WEIGHT: 212 LBS | TEMPERATURE: 98 F

## 2024-01-18 DIAGNOSIS — E66.01 CLASS 2 SEVERE OBESITY WITH SERIOUS COMORBIDITY AND BODY MASS INDEX (BMI) OF 38.0 TO 38.9 IN ADULT, UNSPECIFIED OBESITY TYPE (HCC): ICD-10-CM

## 2024-01-18 DIAGNOSIS — Z86.39 HISTORY OF HYPERTHYROIDISM: ICD-10-CM

## 2024-01-18 DIAGNOSIS — E89.0 POSTABLATIVE HYPOTHYROIDISM: ICD-10-CM

## 2024-01-18 DIAGNOSIS — E11.40 TYPE 2 DIABETES, CONTROLLED, WITH NEUROPATHY (HCC): Primary | ICD-10-CM

## 2024-01-18 DIAGNOSIS — R74.8 ELEVATED ALKALINE PHOSPHATASE LEVEL: ICD-10-CM

## 2024-01-18 DIAGNOSIS — I10 PRIMARY HYPERTENSION: ICD-10-CM

## 2024-01-18 DIAGNOSIS — E78.2 MIXED HYPERLIPIDEMIA: ICD-10-CM

## 2024-01-18 DIAGNOSIS — R93.89 THYROID WITH HETEROGENEOUS ECHOTEXTURE DETERMINED BY ULTRASOUND: ICD-10-CM

## 2024-01-18 PROBLEM — E66.812 CLASS 2 SEVERE OBESITY WITH SERIOUS COMORBIDITY AND BODY MASS INDEX (BMI) OF 38.0 TO 38.9 IN ADULT: Status: ACTIVE | Noted: 2024-01-18

## 2024-01-18 PROCEDURE — 3078F DIAST BP <80 MM HG: CPT | Performed by: INTERNAL MEDICINE

## 2024-01-18 PROCEDURE — 1123F ACP DISCUSS/DSCN MKR DOCD: CPT | Performed by: INTERNAL MEDICINE

## 2024-01-18 PROCEDURE — 3077F SYST BP >= 140 MM HG: CPT | Performed by: INTERNAL MEDICINE

## 2024-01-18 PROCEDURE — 3051F HG A1C>EQUAL 7.0%<8.0%: CPT | Performed by: INTERNAL MEDICINE

## 2024-01-18 PROCEDURE — 99214 OFFICE O/P EST MOD 30 MIN: CPT | Performed by: INTERNAL MEDICINE

## 2024-01-18 RX ORDER — GLIPIZIDE 10 MG/1
10 TABLET, FILM COATED, EXTENDED RELEASE ORAL 2 TIMES DAILY
Qty: 180 TABLET | Refills: 1 | Status: SHIPPED | OUTPATIENT
Start: 2024-01-18

## 2024-01-18 RX ORDER — LEVOTHYROXINE SODIUM 0.1 MG/1
TABLET ORAL
Qty: 90 TABLET | Refills: 1 | Status: SHIPPED | OUTPATIENT
Start: 2024-01-18

## 2024-01-18 RX ORDER — METFORMIN HYDROCHLORIDE 500 MG/1
2000 TABLET, EXTENDED RELEASE ORAL DAILY
Qty: 360 TABLET | Refills: 1 | Status: SHIPPED | OUTPATIENT
Start: 2024-01-18

## 2024-01-18 RX ORDER — PEN NEEDLE, DIABETIC 32GX 5/32"
1 NEEDLE, DISPOSABLE MISCELLANEOUS DAILY
COMMUNITY
Start: 2023-12-19

## 2024-01-18 NOTE — PROGRESS NOTES
Mckayla Chavez is a 68 y.o. female who presents for Type 2 diabetes mellitus.     Current symptoms/problems include  hyperglycemia  and are worsening.     1.  Type 2 diabetes, controlled, with neuropathy (HCC) [E11.40]     Diagnosed with Type 2 diabetes mellitus in 2012.    Comorbid conditions: hyperlipidemia and Neuropathy     Current diabetic medications include: metformin  mg 4 tablets daily, glipizide XL 10 mg bid, Toujeo Max 60 units qhs     Had ovarian tumor removed and hysterectomy.  Had kidney stones.     Intolerance to diabetes medications: No  Had yeast infections. Jardiance may not be a good choice.  Weight trend: stable  Prior visit with dietician: yes  Current diet: on average, 2-3 meals per day  Current exercise: walks     Current monitoring regimen: home blood tests - 2 times daily  Has brought blood glucose log/meter:  No  Home blood sugar records: fasting range:  and postprandial range: 100-140  Any episodes of hypoglycemia? Yes  Hypoglycemia frequency and time(s):  one episode in 3 months  Does patient have Glucagon emergency kit? No  Does patient have rapid acting carbohydrate?  No  Does patient wear a medic alert bracelet or necklace?  No     2. Mixed hyperlipidemia  No muscle pain.     3. Primary hypertension  No headaches.     4. Obesity  Tries to eat healthier. Lower carbs.     5. History of Hyperthyroidism      10.3 mCi 131 capsule was administered orally on on 07/12/2019.           TSH <0.005, FT4 1.41  TSH low since 2017 2.19-0.18-0.051-<005  No palpitations, anxiety, had sleep problems for a long time.  No family Hx of thyroid problems.     6. Thyroid with heterogeneous echotexture determined by ultrasound   No thyroid cancer in family.  No radiation in her neck.     7. Elevated alkaline phosphatase level  No bone pain.     8. Hypothyroidism  Has fatigue, similar to before.       EXAMINATION:   THYROID ULTRASOUND       11/12/2022       COMPARISON:   10/03/2020, 02/21/2019

## 2024-01-31 ENCOUNTER — TELEPHONE (OUTPATIENT)
Dept: ENDOCRINOLOGY | Age: 69
End: 2024-01-31

## 2024-01-31 DIAGNOSIS — E11.65 POORLY CONTROLLED TYPE 2 DIABETES MELLITUS WITH NEUROPATHY (HCC): Primary | ICD-10-CM

## 2024-01-31 DIAGNOSIS — E11.40 POORLY CONTROLLED TYPE 2 DIABETES MELLITUS WITH NEUROPATHY (HCC): Primary | ICD-10-CM

## 2024-01-31 NOTE — TELEPHONE ENCOUNTER
Call from pt stating that her insurance will cover Dulaglutide 4.5 MG/0.5ML due to it being written as a 25 day supply  Pt stated that it has to be written as a 35 day supply

## 2024-04-13 ENCOUNTER — HOSPITAL ENCOUNTER (OUTPATIENT)
Age: 69
Discharge: HOME | End: 2024-04-13
Payer: COMMERCIAL

## 2024-04-13 DIAGNOSIS — R74.8 ELEVATED ALKALINE PHOSPHATASE LEVEL: ICD-10-CM

## 2024-04-13 DIAGNOSIS — Z86.39 HISTORY OF HYPERTHYROIDISM: ICD-10-CM

## 2024-04-13 DIAGNOSIS — E89.0 POSTABLATIVE HYPOTHYROIDISM: ICD-10-CM

## 2024-04-13 DIAGNOSIS — R93.89 THYROID WITH HETEROGENEOUS ECHOTEXTURE DETERMINED BY ULTRASOUND: ICD-10-CM

## 2024-04-13 DIAGNOSIS — E78.2 MIXED HYPERLIPIDEMIA: ICD-10-CM

## 2024-04-13 LAB
25(OH)D3 SERPL-MCNC: 52.9 NG/ML
ALBUMIN SERPL-MCNC: 3.9 G/DL (ref 3.4–5)
ALBUMIN/GLOB SERPL: 1.4 {RATIO} (ref 1.1–2.2)
ALP SERPL-CCNC: 176 U/L (ref 40–129)
ALT SERPL-CCNC: 15 U/L (ref 10–40)
ANION GAP SERPL CALCULATED.3IONS-SCNC: 12 MMOL/L (ref 3–16)
AST SERPL-CCNC: 30 U/L (ref 15–37)
BILIRUB SERPL-MCNC: 0.3 MG/DL (ref 0–1)
BUN SERPL-MCNC: 29 MG/DL (ref 7–20)
CALCIUM SERPL-MCNC: 9.6 MG/DL (ref 8.3–10.6)
CHLORIDE SERPL-SCNC: 103 MMOL/L (ref 99–110)
CHOLEST SERPL-MCNC: 122 MG/DL (ref 0–199)
CO2 SERPL-SCNC: 29 MMOL/L (ref 21–32)
CREAT SERPL-MCNC: 0.9 MG/DL (ref 0.6–1.2)
GFR SERPLBLD CREATININE-BSD FMLA CKD-EPI: 69 ML/MIN/{1.73_M2}
GLUCOSE SERPL-MCNC: 70 MG/DL (ref 70–99)
HDLC SERPL-MCNC: 58 MG/DL (ref 40–60)
LDL CHOLESTEROL CALCULATED: 52 MG/DL
POTASSIUM SERPL-SCNC: 4.4 MMOL/L (ref 3.5–5.1)
PROT SERPL-MCNC: 6.7 G/DL (ref 6.4–8.2)
SODIUM SERPL-SCNC: 144 MMOL/L (ref 136–145)
T3FREE SERPL-MCNC: 2.2 PG/ML (ref 2.3–4.2)
T4 FREE SERPL-MCNC: 1.3 NG/DL (ref 0.9–1.8)
TRIGL SERPL-MCNC: 62 MG/DL (ref 0–150)
TSH SERPL DL<=0.005 MIU/L-ACNC: 2.07 UIU/ML (ref 0.27–4.2)
VLDLC SERPL CALC-MCNC: 12 MG/DL

## 2024-04-13 PROCEDURE — 80053 COMPREHEN METABOLIC PANEL: CPT

## 2024-04-13 PROCEDURE — 36415 COLL VENOUS BLD VENIPUNCTURE: CPT

## 2024-04-13 PROCEDURE — 84443 ASSAY THYROID STIM HORMONE: CPT

## 2024-04-13 PROCEDURE — 82306 VITAMIN D 25 HYDROXY: CPT

## 2024-04-13 PROCEDURE — 80061 LIPID PANEL: CPT

## 2024-04-13 PROCEDURE — 84439 ASSAY OF FREE THYROXINE: CPT

## 2024-04-13 PROCEDURE — 83036 HEMOGLOBIN GLYCOSYLATED A1C: CPT

## 2024-04-13 PROCEDURE — 84481 FREE ASSAY (FT-3): CPT

## 2024-04-14 LAB
EST. AVERAGE GLUCOSE BLD GHB EST-MCNC: 168.6 MG/DL
HBA1C MFR BLD: 7.5 %

## 2024-04-18 ENCOUNTER — OFFICE VISIT (OUTPATIENT)
Dept: ENDOCRINOLOGY | Age: 69
End: 2024-04-18
Payer: COMMERCIAL

## 2024-04-18 VITALS
WEIGHT: 215 LBS | SYSTOLIC BLOOD PRESSURE: 139 MMHG | TEMPERATURE: 98 F | BODY MASS INDEX: 39.56 KG/M2 | HEIGHT: 62 IN | HEART RATE: 72 BPM | DIASTOLIC BLOOD PRESSURE: 66 MMHG | OXYGEN SATURATION: 98 % | RESPIRATION RATE: 14 BRPM

## 2024-04-18 DIAGNOSIS — E11.65 POORLY CONTROLLED TYPE 2 DIABETES MELLITUS WITH NEUROPATHY (HCC): Primary | ICD-10-CM

## 2024-04-18 DIAGNOSIS — E11.40 POORLY CONTROLLED TYPE 2 DIABETES MELLITUS WITH NEUROPATHY (HCC): Primary | ICD-10-CM

## 2024-04-18 DIAGNOSIS — E89.0 POSTABLATIVE HYPOTHYROIDISM: ICD-10-CM

## 2024-04-18 DIAGNOSIS — E66.01 CLASS 2 SEVERE OBESITY WITH SERIOUS COMORBIDITY AND BODY MASS INDEX (BMI) OF 39.0 TO 39.9 IN ADULT, UNSPECIFIED OBESITY TYPE (HCC): ICD-10-CM

## 2024-04-18 DIAGNOSIS — R93.89 THYROID WITH HETEROGENEOUS ECHOTEXTURE DETERMINED BY ULTRASOUND: ICD-10-CM

## 2024-04-18 DIAGNOSIS — Z86.39 HISTORY OF HYPERTHYROIDISM: ICD-10-CM

## 2024-04-18 DIAGNOSIS — I10 PRIMARY HYPERTENSION: ICD-10-CM

## 2024-04-18 DIAGNOSIS — E78.2 MIXED HYPERLIPIDEMIA: ICD-10-CM

## 2024-04-18 DIAGNOSIS — R74.8 ELEVATED ALKALINE PHOSPHATASE LEVEL: ICD-10-CM

## 2024-04-18 PROCEDURE — 3078F DIAST BP <80 MM HG: CPT | Performed by: INTERNAL MEDICINE

## 2024-04-18 PROCEDURE — 3051F HG A1C>EQUAL 7.0%<8.0%: CPT | Performed by: INTERNAL MEDICINE

## 2024-04-18 PROCEDURE — 1123F ACP DISCUSS/DSCN MKR DOCD: CPT | Performed by: INTERNAL MEDICINE

## 2024-04-18 PROCEDURE — 3075F SYST BP GE 130 - 139MM HG: CPT | Performed by: INTERNAL MEDICINE

## 2024-04-18 PROCEDURE — 99214 OFFICE O/P EST MOD 30 MIN: CPT | Performed by: INTERNAL MEDICINE

## 2024-04-18 RX ORDER — GLIPIZIDE 10 MG/1
10 TABLET, FILM COATED, EXTENDED RELEASE ORAL 2 TIMES DAILY
Qty: 180 TABLET | Refills: 1 | Status: SHIPPED | OUTPATIENT
Start: 2024-04-18

## 2024-04-18 RX ORDER — LEVOTHYROXINE SODIUM 0.1 MG/1
TABLET ORAL
Qty: 90 TABLET | Refills: 1 | Status: SHIPPED | OUTPATIENT
Start: 2024-04-18

## 2024-04-18 RX ORDER — METFORMIN HYDROCHLORIDE 500 MG/1
2000 TABLET, EXTENDED RELEASE ORAL DAILY
Qty: 360 TABLET | Refills: 1 | Status: SHIPPED | OUTPATIENT
Start: 2024-04-18

## 2024-04-18 NOTE — PROGRESS NOTES
10/03/2020, 2019       HISTORY:   ORDERING SYSTEM PROVIDED HISTORY: Multinodular goiter   TECHNOLOGIST PROVIDED HISTORY:   Reason for Exam: Multinodular Goiter   Additional signs and symptoms: thyroid iodine therapy ablation done 2019   Relevant Medical/Surgical History: follow up ultrasound       FINDINGS:   Right thyroid lobe:  Measures 4.2 x 1.4 x 1.7 cm       Left thyroid lobe:  Measures 3.6 x 1.0 x 1.8 cm       Isthmus:  Measures 2 mm       Thyroid Gland:  The thyroid parenchyma is heterogeneous in echotexture,   similar to prior exams, and demonstrates normal internal vascularity.       Nodules: No discrete thyroid nodule or mass is identified.       Cervical lymphadenopathy: No abnormal lymph nodes in the imaged portions of   the neck.           Impression   1. Stable heterogeneous echogenicity throughout the thyroid parenchyma,   unchanged from prior exams, possibly secondary to a chronic thyroiditis.   2. No discrete thyroid nodule or mass.        EXAMINATION:   THYROID ULTRASOUND       10/3/2020       COMPARISON:   None.       HISTORY:   ORDERING SYSTEM PROVIDED HISTORY: Multinodular goiter       FINDINGS:   Right thyroid lobe:  3.8 x 1.1 x 1.2 cm       Left thyroid lobe:  2.6 x 1 x 1.6 cm       Isthmus:  3 mm thickness       Thyroid Gland:  Thyroid gland demonstrates mildly diffusely heterogeneous   echotexture and unremarkable vascularity.       Nodules: No thyroid nodules are present.       Cervical lymphadenopathy: Patient describes palpable abnormality at the left   side of the neck corresponding to a normal appearing 7 x 9 x 13 mm lymph node   along the lateral margin of the thyroid gland.  No abnormal lymph nodes in   the imaged portions of the neck.           Impression   Unremarkable thyroid ultrasound.           Department of Pathology  FINAL CYTOLOGY REPORT  Patient Name:  DES ROSAS               Accession No:  [id number]   Age Sex:   1955    63 Y / F

## 2024-07-06 ENCOUNTER — HOSPITAL ENCOUNTER (OUTPATIENT)
Age: 69
Discharge: HOME | End: 2024-07-06
Payer: COMMERCIAL

## 2024-07-06 DIAGNOSIS — Z86.39 HISTORY OF HYPERTHYROIDISM: ICD-10-CM

## 2024-07-06 DIAGNOSIS — E11.65 POORLY CONTROLLED TYPE 2 DIABETES MELLITUS WITH NEUROPATHY (HCC): ICD-10-CM

## 2024-07-06 DIAGNOSIS — I10 PRIMARY HYPERTENSION: ICD-10-CM

## 2024-07-06 DIAGNOSIS — R74.8 ELEVATED ALKALINE PHOSPHATASE LEVEL: ICD-10-CM

## 2024-07-06 DIAGNOSIS — E11.40 POORLY CONTROLLED TYPE 2 DIABETES MELLITUS WITH NEUROPATHY (HCC): ICD-10-CM

## 2024-07-06 DIAGNOSIS — R93.89 THYROID WITH HETEROGENEOUS ECHOTEXTURE DETERMINED BY ULTRASOUND: ICD-10-CM

## 2024-07-06 DIAGNOSIS — E78.2 MIXED HYPERLIPIDEMIA: ICD-10-CM

## 2024-07-06 DIAGNOSIS — E89.0 POSTABLATIVE HYPOTHYROIDISM: ICD-10-CM

## 2024-07-06 LAB
25(OH)D3 SERPL-MCNC: 57.7 NG/ML
ALBUMIN SERPL-MCNC: 4 G/DL (ref 3.4–5)
ALBUMIN/GLOB SERPL: 1.3 {RATIO} (ref 1.1–2.2)
ALP SERPL-CCNC: 144 U/L (ref 40–129)
ALT SERPL-CCNC: 6 U/L (ref 10–40)
ANION GAP SERPL CALCULATED.3IONS-SCNC: 14 MMOL/L (ref 3–16)
AST SERPL-CCNC: 16 U/L (ref 15–37)
BILIRUB SERPL-MCNC: 0.3 MG/DL (ref 0–1)
BUN SERPL-MCNC: 40 MG/DL (ref 7–20)
CALCIUM SERPL-MCNC: 9.9 MG/DL (ref 8.3–10.6)
CHLORIDE SERPL-SCNC: 104 MMOL/L (ref 99–110)
CHOLEST SERPL-MCNC: 131 MG/DL (ref 0–199)
CO2 SERPL-SCNC: 27 MMOL/L (ref 21–32)
CREAT SERPL-MCNC: 1 MG/DL (ref 0.6–1.2)
CREAT UR-MCNC: 110.1 MG/DL (ref 28–259)
GFR SERPLBLD CREATININE-BSD FMLA CKD-EPI: 61 ML/MIN/{1.73_M2}
GLUCOSE SERPL-MCNC: 64 MG/DL (ref 70–99)
HDLC SERPL-MCNC: 56 MG/DL (ref 40–60)
LDLC SERPL CALC-MCNC: 59 MG/DL
MICROALBUMIN UR DL<=1MG/L-MCNC: 4.6 MG/DL
MICROALBUMIN/CREAT UR: 41.8 MG/G (ref 0–30)
POTASSIUM SERPL-SCNC: 4.4 MMOL/L (ref 3.5–5.1)
PROT SERPL-MCNC: 7.1 G/DL (ref 6.4–8.2)
SODIUM SERPL-SCNC: 145 MMOL/L (ref 136–145)
T4 FREE SERPL-MCNC: 1.4 NG/DL (ref 0.9–1.8)
TRIGL SERPL-MCNC: 79 MG/DL (ref 0–150)
TSH SERPL DL<=0.005 MIU/L-ACNC: 1.92 UIU/ML (ref 0.27–4.2)
VLDLC SERPL CALC-MCNC: 16 MG/DL

## 2024-07-06 PROCEDURE — 80061 LIPID PANEL: CPT

## 2024-07-06 PROCEDURE — 82043 UR ALBUMIN QUANTITATIVE: CPT

## 2024-07-06 PROCEDURE — 84443 ASSAY THYROID STIM HORMONE: CPT

## 2024-07-06 PROCEDURE — 82306 VITAMIN D 25 HYDROXY: CPT

## 2024-07-06 PROCEDURE — 83036 HEMOGLOBIN GLYCOSYLATED A1C: CPT

## 2024-07-06 PROCEDURE — 82570 ASSAY OF URINE CREATININE: CPT

## 2024-07-06 PROCEDURE — 80053 COMPREHEN METABOLIC PANEL: CPT

## 2024-07-06 PROCEDURE — 84439 ASSAY OF FREE THYROXINE: CPT

## 2024-07-06 PROCEDURE — 36415 COLL VENOUS BLD VENIPUNCTURE: CPT

## 2024-07-07 LAB
EST. AVERAGE GLUCOSE BLD GHB EST-MCNC: 162.8 MG/DL
HBA1C MFR BLD: 7.3 %

## 2024-07-09 RX ORDER — ATORVASTATIN CALCIUM 40 MG/1
TABLET, FILM COATED ORAL
Qty: 90 TABLET | Refills: 3 | Status: SHIPPED | OUTPATIENT
Start: 2024-07-09

## 2024-07-18 ENCOUNTER — OFFICE VISIT (OUTPATIENT)
Dept: ENDOCRINOLOGY | Age: 69
End: 2024-07-18
Payer: COMMERCIAL

## 2024-07-18 VITALS
HEIGHT: 62 IN | HEART RATE: 87 BPM | TEMPERATURE: 98 F | SYSTOLIC BLOOD PRESSURE: 135 MMHG | DIASTOLIC BLOOD PRESSURE: 71 MMHG | RESPIRATION RATE: 14 BRPM | OXYGEN SATURATION: 99 % | WEIGHT: 213 LBS | BODY MASS INDEX: 39.2 KG/M2

## 2024-07-18 DIAGNOSIS — E78.2 MIXED HYPERLIPIDEMIA: ICD-10-CM

## 2024-07-18 DIAGNOSIS — Z86.39 HISTORY OF HYPERTHYROIDISM: ICD-10-CM

## 2024-07-18 DIAGNOSIS — R74.8 ELEVATED ALKALINE PHOSPHATASE LEVEL: ICD-10-CM

## 2024-07-18 DIAGNOSIS — R93.89 THYROID WITH HETEROGENEOUS ECHOTEXTURE DETERMINED BY ULTRASOUND: ICD-10-CM

## 2024-07-18 DIAGNOSIS — E89.0 POSTABLATIVE HYPOTHYROIDISM: ICD-10-CM

## 2024-07-18 DIAGNOSIS — I10 PRIMARY HYPERTENSION: ICD-10-CM

## 2024-07-18 DIAGNOSIS — R80.9 MICROALBUMINURIA: ICD-10-CM

## 2024-07-18 DIAGNOSIS — E11.40 POORLY CONTROLLED TYPE 2 DIABETES MELLITUS WITH NEUROPATHY (HCC): Primary | ICD-10-CM

## 2024-07-18 DIAGNOSIS — E66.01 CLASS 2 SEVERE OBESITY WITH SERIOUS COMORBIDITY AND BODY MASS INDEX (BMI) OF 38.0 TO 38.9 IN ADULT, UNSPECIFIED OBESITY TYPE (HCC): ICD-10-CM

## 2024-07-18 DIAGNOSIS — E11.65 POORLY CONTROLLED TYPE 2 DIABETES MELLITUS WITH NEUROPATHY (HCC): Primary | ICD-10-CM

## 2024-07-18 PROCEDURE — 3078F DIAST BP <80 MM HG: CPT | Performed by: INTERNAL MEDICINE

## 2024-07-18 PROCEDURE — 3051F HG A1C>EQUAL 7.0%<8.0%: CPT | Performed by: INTERNAL MEDICINE

## 2024-07-18 PROCEDURE — 1123F ACP DISCUSS/DSCN MKR DOCD: CPT | Performed by: INTERNAL MEDICINE

## 2024-07-18 PROCEDURE — 3075F SYST BP GE 130 - 139MM HG: CPT | Performed by: INTERNAL MEDICINE

## 2024-07-18 PROCEDURE — 99214 OFFICE O/P EST MOD 30 MIN: CPT | Performed by: INTERNAL MEDICINE

## 2024-07-18 RX ORDER — GLIPIZIDE 10 MG/1
10 TABLET, FILM COATED, EXTENDED RELEASE ORAL 2 TIMES DAILY
Qty: 180 TABLET | Refills: 1 | Status: SHIPPED | OUTPATIENT
Start: 2024-07-18

## 2024-07-18 RX ORDER — METFORMIN HYDROCHLORIDE 500 MG/1
2000 TABLET, EXTENDED RELEASE ORAL DAILY
Qty: 360 TABLET | Refills: 1 | Status: SHIPPED | OUTPATIENT
Start: 2024-07-18

## 2024-07-18 RX ORDER — LEVOTHYROXINE SODIUM 0.1 MG/1
TABLET ORAL
Qty: 90 TABLET | Refills: 1 | Status: SHIPPED | OUTPATIENT
Start: 2024-07-18

## 2024-07-18 RX ORDER — DULAGLUTIDE 3 MG/.5ML
3 INJECTION, SOLUTION SUBCUTANEOUS WEEKLY
Qty: 2 ML | Refills: 3 | Status: SHIPPED | OUTPATIENT
Start: 2024-07-18

## 2024-07-18 NOTE — PROGRESS NOTES
Mckayla Chavez is a 68 y.o. female who presents for Type 2 diabetes mellitus.     Current symptoms/problems include  hyperglycemia  and are worsening.     1.  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    Comorbid conditions: hyperlipidemia and Neuropathy     Current diabetic medications include: metformin  mg 4 tablets daily, glipizide XL 10 mg bid, Toujeo Max 60 units qhs, Tocodyo     Had ovarian tumor removed and hysterectomy.  Had kidney stones.     Intolerance to diabetes medications: No  Had yeast infections. Jardiance may not be a good choice.  Weight trend: stable  Prior visit with dietician: yes  Current diet: on average, 2-3 meals per day  Current exercise: walks     Current monitoring regimen: home blood tests - 2 times daily  Has brought blood glucose log/meter:  No  Home blood sugar records: fasting range:  and postprandial range: 100-140  Any episodes of hypoglycemia? Yes  Hypoglycemia frequency and time(s):  one episode in 3 months  Does patient have Glucagon emergency kit? No  Does patient have rapid acting carbohydrate?  No  Does patient wear a medic alert bracelet or necklace?  No     2. Mixed hyperlipidemia  No muscle pain.     3. Primary hypertension  No headaches.     4. Obesity  Tries to eat healthier. Lower carbs.     5. History of Hyperthyroidism      10.3 mCi 131 capsule was administered orally on on 07/12/2019.           TSH <0.005, FT4 1.41  TSH low since 2017 2.19-0.18-0.051-<005  No palpitations, anxiety, had sleep problems for a long time.  No family Hx of thyroid problems.     6. Thyroid with heterogeneous echotexture determined by ultrasound   No thyroid cancer in family.  No radiation in her neck.     7. Elevated alkaline phosphatase level  No bone pain.     8. Hypothyroidism  Has fatigue, similar to before.    9.  Microalbuminuria  No urination problems     EXAMINATION:   THYROID

## 2024-08-05 NOTE — PROGRESS NOTES
[facility]   Cardiac Evaluation      Patient: Mckayla Chavez  YOB: 1955  Date: 24       Chief Complaint   Patient presents with    Hypertension    Hyperlipidemia        Referring provider: Sherry Thomas MD    History of Present Illness:   Ms Chavez is seen today in follow up. She was previously seen in  for abnormal EKG findings. She had presented to pcp for pre-op clearance for EGD and EKG was performed. Mckayla has a history of hypertension, hyperlipidemia, and diabetes, hyperthyroidism. She does not smoke or drink alcohol. Mckayla is a  at [employer]. Surgeries include hysterectomy. Family history of mother having had CAD, is  ( after open heart at [facility]).    Ms Chavez had been following with Dr Solorzano, neurologist, for shadow/floater in her eye. She was advised to have an echo for ? Small vessel disease w/ optic arteritis and ? TIA.  She was given plavix and asa daily.    Today, Ms Chavez denies any chest pain, palpitations, SOB, dyspnea with exertion, dizziness, or edema. She enjoys traveling to [place] to see her brother and horses.     Past Medical History:   has a past medical history of Class 2 obesity with body mass index (BMI) of 38.0 to 38.9 in adult, Hyperlipidemia, Hypertension, Kidney stone, Ovarian mass, Ovarian tumor, Thyroid disease, and Type II or unspecified type diabetes mellitus without mention of complication, not stated as uncontrolled.    Surgical History:   has a past surgical history that includes Hysterectomy; [place] tooth extraction; Salpingo-oophorectomy (N/A, 12/10/2019); and Ovary removal (Bilateral).     Social History:  History     Social History    Marital Status: Single     Spouse Name: N/A     Number of Children: N/A    Years of Education: N/A     Occupational History     at [employer].     Social History Main Topics    Smoking status: Never Smoker     Smokeless tobacco: Not on file    Alcohol Use: No    Drug Use:  The patient is Stable - Low risk of patient condition declining or worsening    Shift Goals  Clinical Goals: hemodynamic stability  Patient Goals: sleep  Family Goals: megan    Progress made toward(s) clinical / shift goals:    Problem: Knowledge Deficit - COPD  Goal: Patient/significant other demonstrates understanding of disease process, utilization of the Action Plan, medications and discharge instruction  Outcome: Progressing     Problem: Risk for Infection - COPD  Goal: Patient will remain free from signs and symptoms of infection  Outcome: Progressing     Problem: Self Care  Goal: Patient will have the ability to perform ADLs independently or with assistance (bathe, groom, dress, toilet and feed)  Outcome: Progressing     Problem: Skin Integrity  Goal: Skin integrity is maintained or improved  Outcome: Progressing     Problem: Fall Risk  Goal: Patient will remain free from falls  Outcome: Progressing       Patient is not progressing towards the following goals:

## 2024-08-22 ENCOUNTER — OFFICE VISIT (OUTPATIENT)
Dept: CARDIOLOGY CLINIC | Age: 69
End: 2024-08-22
Payer: COMMERCIAL

## 2024-08-22 VITALS
HEIGHT: 62 IN | WEIGHT: 215 LBS | DIASTOLIC BLOOD PRESSURE: 76 MMHG | OXYGEN SATURATION: 98 % | SYSTOLIC BLOOD PRESSURE: 130 MMHG | BODY MASS INDEX: 39.56 KG/M2 | HEART RATE: 85 BPM

## 2024-08-22 DIAGNOSIS — I10 PRIMARY HYPERTENSION: Primary | ICD-10-CM

## 2024-08-22 DIAGNOSIS — E78.2 MIXED HYPERLIPIDEMIA: ICD-10-CM

## 2024-08-22 DIAGNOSIS — R94.31 ABNORMAL EKG: ICD-10-CM

## 2024-08-22 DIAGNOSIS — E11.40 TYPE 2 DIABETES, CONTROLLED, WITH NEUROPATHY (HCC): ICD-10-CM

## 2024-08-22 PROCEDURE — 3051F HG A1C>EQUAL 7.0%<8.0%: CPT | Performed by: NURSE PRACTITIONER

## 2024-08-22 PROCEDURE — 99214 OFFICE O/P EST MOD 30 MIN: CPT | Performed by: NURSE PRACTITIONER

## 2024-08-22 PROCEDURE — 3078F DIAST BP <80 MM HG: CPT | Performed by: NURSE PRACTITIONER

## 2024-08-22 PROCEDURE — 3075F SYST BP GE 130 - 139MM HG: CPT | Performed by: NURSE PRACTITIONER

## 2024-08-22 PROCEDURE — 1123F ACP DISCUSS/DSCN MKR DOCD: CPT | Performed by: NURSE PRACTITIONER

## 2024-10-12 ENCOUNTER — HOSPITAL ENCOUNTER (OUTPATIENT)
Age: 69
Discharge: HOME | End: 2024-10-12
Payer: COMMERCIAL

## 2024-10-12 DIAGNOSIS — E78.2 MIXED HYPERLIPIDEMIA: ICD-10-CM

## 2024-10-12 DIAGNOSIS — E89.0 POSTABLATIVE HYPOTHYROIDISM: ICD-10-CM

## 2024-10-12 DIAGNOSIS — E11.65 POORLY CONTROLLED TYPE 2 DIABETES MELLITUS WITH NEUROPATHY (HCC): ICD-10-CM

## 2024-10-12 DIAGNOSIS — I10 PRIMARY HYPERTENSION: ICD-10-CM

## 2024-10-12 DIAGNOSIS — R74.8 ELEVATED ALKALINE PHOSPHATASE LEVEL: ICD-10-CM

## 2024-10-12 DIAGNOSIS — E11.40 POORLY CONTROLLED TYPE 2 DIABETES MELLITUS WITH NEUROPATHY (HCC): ICD-10-CM

## 2024-10-12 DIAGNOSIS — R80.9 MICROALBUMINURIA: ICD-10-CM

## 2024-10-12 DIAGNOSIS — Z86.39 HISTORY OF HYPERTHYROIDISM: ICD-10-CM

## 2024-10-12 DIAGNOSIS — R93.89 THYROID WITH HETEROGENEOUS ECHOTEXTURE DETERMINED BY ULTRASOUND: ICD-10-CM

## 2024-10-12 LAB
25(OH)D3 SERPL-MCNC: 50.2 NG/ML
ALBUMIN SERPL-MCNC: 4 G/DL (ref 3.4–5)
ALBUMIN/GLOB SERPL: 1.4 {RATIO} (ref 1.1–2.2)
ALP SERPL-CCNC: 142 U/L (ref 40–129)
ALT SERPL-CCNC: 9 U/L (ref 10–40)
ANION GAP SERPL CALCULATED.3IONS-SCNC: 11 MMOL/L (ref 3–16)
AST SERPL-CCNC: 21 U/L (ref 15–37)
BILIRUB SERPL-MCNC: 0.4 MG/DL (ref 0–1)
BUN SERPL-MCNC: 34 MG/DL (ref 7–20)
CALCIUM SERPL-MCNC: 9.4 MG/DL (ref 8.3–10.6)
CHLORIDE SERPL-SCNC: 104 MMOL/L (ref 99–110)
CHOLEST SERPL-MCNC: 133 MG/DL (ref 0–199)
CO2 SERPL-SCNC: 28 MMOL/L (ref 21–32)
CREAT SERPL-MCNC: 1 MG/DL (ref 0.6–1.2)
CREAT UR-MCNC: 131 MG/DL (ref 28–259)
GFR SERPLBLD CREATININE-BSD FMLA CKD-EPI: 61 ML/MIN/{1.73_M2}
GLUCOSE SERPL-MCNC: 65 MG/DL (ref 70–99)
HDLC SERPL-MCNC: 55 MG/DL (ref 40–60)
LDLC SERPL CALC-MCNC: 64 MG/DL
MICROALBUMIN UR DL<=1MG/L-MCNC: 7.16 MG/DL
MICROALBUMIN/CREAT UR: 54.7 MG/G (ref 0–30)
POTASSIUM SERPL-SCNC: 4.1 MMOL/L (ref 3.5–5.1)
PROT SERPL-MCNC: 6.8 G/DL (ref 6.4–8.2)
SODIUM SERPL-SCNC: 143 MMOL/L (ref 136–145)
T4 FREE SERPL-MCNC: 1.3 NG/DL (ref 0.9–1.8)
TRIGL SERPL-MCNC: 71 MG/DL (ref 0–150)
TSH SERPL DL<=0.005 MIU/L-ACNC: 2.12 UIU/ML (ref 0.27–4.2)
VLDLC SERPL CALC-MCNC: 14 MG/DL

## 2024-10-12 PROCEDURE — 84439 ASSAY OF FREE THYROXINE: CPT

## 2024-10-12 PROCEDURE — 83036 HEMOGLOBIN GLYCOSYLATED A1C: CPT

## 2024-10-12 PROCEDURE — 82570 ASSAY OF URINE CREATININE: CPT

## 2024-10-12 PROCEDURE — 80053 COMPREHEN METABOLIC PANEL: CPT

## 2024-10-12 PROCEDURE — 82043 UR ALBUMIN QUANTITATIVE: CPT

## 2024-10-12 PROCEDURE — 84443 ASSAY THYROID STIM HORMONE: CPT

## 2024-10-12 PROCEDURE — 36415 COLL VENOUS BLD VENIPUNCTURE: CPT

## 2024-10-12 PROCEDURE — 80061 LIPID PANEL: CPT

## 2024-10-12 PROCEDURE — 82306 VITAMIN D 25 HYDROXY: CPT

## 2024-10-13 LAB
EST. AVERAGE GLUCOSE BLD GHB EST-MCNC: 177.2 MG/DL
HBA1C MFR BLD: 7.8 %

## 2024-10-17 ENCOUNTER — OFFICE VISIT (OUTPATIENT)
Dept: ENDOCRINOLOGY | Age: 69
End: 2024-10-17
Payer: COMMERCIAL

## 2024-10-17 VITALS
HEIGHT: 62 IN | RESPIRATION RATE: 14 BRPM | TEMPERATURE: 98 F | HEART RATE: 65 BPM | OXYGEN SATURATION: 95 % | WEIGHT: 212 LBS | SYSTOLIC BLOOD PRESSURE: 149 MMHG | BODY MASS INDEX: 39.01 KG/M2 | DIASTOLIC BLOOD PRESSURE: 82 MMHG

## 2024-10-17 DIAGNOSIS — R93.89 THYROID WITH HETEROGENEOUS ECHOTEXTURE DETERMINED BY ULTRASOUND: ICD-10-CM

## 2024-10-17 DIAGNOSIS — Z86.39 HISTORY OF HYPERTHYROIDISM: ICD-10-CM

## 2024-10-17 DIAGNOSIS — E89.0 POSTABLATIVE HYPOTHYROIDISM: ICD-10-CM

## 2024-10-17 DIAGNOSIS — E66.812 CLASS 2 SEVERE OBESITY WITH SERIOUS COMORBIDITY AND BODY MASS INDEX (BMI) OF 39.0 TO 39.9 IN ADULT, UNSPECIFIED OBESITY TYPE: ICD-10-CM

## 2024-10-17 DIAGNOSIS — E78.2 MIXED HYPERLIPIDEMIA: ICD-10-CM

## 2024-10-17 DIAGNOSIS — E11.40 POORLY CONTROLLED TYPE 2 DIABETES MELLITUS WITH NEUROPATHY (HCC): Primary | ICD-10-CM

## 2024-10-17 DIAGNOSIS — R80.9 MICROALBUMINURIA: ICD-10-CM

## 2024-10-17 DIAGNOSIS — E11.65 POORLY CONTROLLED TYPE 2 DIABETES MELLITUS WITH NEUROPATHY (HCC): Primary | ICD-10-CM

## 2024-10-17 DIAGNOSIS — I10 PRIMARY HYPERTENSION: ICD-10-CM

## 2024-10-17 DIAGNOSIS — R74.8 ELEVATED ALKALINE PHOSPHATASE LEVEL: ICD-10-CM

## 2024-10-17 DIAGNOSIS — E66.01 CLASS 2 SEVERE OBESITY WITH SERIOUS COMORBIDITY AND BODY MASS INDEX (BMI) OF 39.0 TO 39.9 IN ADULT, UNSPECIFIED OBESITY TYPE: ICD-10-CM

## 2024-10-17 PROCEDURE — 3077F SYST BP >= 140 MM HG: CPT | Performed by: INTERNAL MEDICINE

## 2024-10-17 PROCEDURE — 3079F DIAST BP 80-89 MM HG: CPT | Performed by: INTERNAL MEDICINE

## 2024-10-17 PROCEDURE — 1123F ACP DISCUSS/DSCN MKR DOCD: CPT | Performed by: INTERNAL MEDICINE

## 2024-10-17 PROCEDURE — 3051F HG A1C>EQUAL 7.0%<8.0%: CPT | Performed by: INTERNAL MEDICINE

## 2024-10-17 PROCEDURE — 99214 OFFICE O/P EST MOD 30 MIN: CPT | Performed by: INTERNAL MEDICINE

## 2024-10-17 RX ORDER — DULAGLUTIDE 4.5 MG/.5ML
4.5 INJECTION, SOLUTION SUBCUTANEOUS WEEKLY
Qty: 6 ML | Refills: 1
Start: 2024-10-17

## 2024-10-17 RX ORDER — METFORMIN HCL 500 MG
2000 TABLET, EXTENDED RELEASE 24 HR ORAL DAILY
Qty: 360 TABLET | Refills: 1 | Status: CANCELLED | OUTPATIENT
Start: 2024-10-17

## 2024-10-17 RX ORDER — GLIPIZIDE 10 MG/1
10 TABLET, FILM COATED, EXTENDED RELEASE ORAL 2 TIMES DAILY
Qty: 180 TABLET | Refills: 1 | Status: CANCELLED | OUTPATIENT
Start: 2024-10-17

## 2024-10-17 RX ORDER — DULAGLUTIDE 4.5 MG/.5ML
4.5 INJECTION, SOLUTION SUBCUTANEOUS WEEKLY
COMMUNITY
End: 2024-10-17 | Stop reason: SDUPTHER

## 2024-10-17 RX ORDER — LEVOTHYROXINE SODIUM 100 UG/1
TABLET ORAL
Qty: 90 TABLET | Refills: 1 | Status: CANCELLED | OUTPATIENT
Start: 2024-10-17

## 2024-10-17 RX ORDER — INSULIN GLARGINE 300 U/ML
56 INJECTION, SOLUTION SUBCUTANEOUS NIGHTLY
Qty: 5 EACH | Refills: 3
Start: 2024-10-17

## 2024-10-17 RX ORDER — DULAGLUTIDE 1.5 MG/.5ML
1.5 INJECTION, SOLUTION SUBCUTANEOUS WEEKLY
Qty: 6 ML | Refills: 0 | Status: CANCELLED | OUTPATIENT
Start: 2024-10-17

## 2024-11-18 DIAGNOSIS — E11.65 POORLY CONTROLLED TYPE 2 DIABETES MELLITUS WITH NEUROPATHY (HCC): ICD-10-CM

## 2024-11-18 DIAGNOSIS — E11.40 POORLY CONTROLLED TYPE 2 DIABETES MELLITUS WITH NEUROPATHY (HCC): ICD-10-CM

## 2024-11-18 RX ORDER — DULAGLUTIDE 4.5 MG/.5ML
INJECTION, SOLUTION SUBCUTANEOUS
Qty: 6 ML | Refills: 3 | OUTPATIENT
Start: 2024-11-18

## 2025-01-09 ENCOUNTER — HOSPITAL ENCOUNTER (OUTPATIENT)
Age: 70
Discharge: HOME | End: 2025-01-09
Payer: COMMERCIAL

## 2025-01-09 DIAGNOSIS — R80.9 MICROALBUMINURIA: ICD-10-CM

## 2025-01-09 DIAGNOSIS — E78.2 MIXED HYPERLIPIDEMIA: ICD-10-CM

## 2025-01-09 DIAGNOSIS — R74.8 ELEVATED ALKALINE PHOSPHATASE LEVEL: ICD-10-CM

## 2025-01-09 DIAGNOSIS — E11.65 POORLY CONTROLLED TYPE 2 DIABETES MELLITUS WITH NEUROPATHY (HCC): ICD-10-CM

## 2025-01-09 DIAGNOSIS — E89.0 POSTABLATIVE HYPOTHYROIDISM: ICD-10-CM

## 2025-01-09 DIAGNOSIS — I10 PRIMARY HYPERTENSION: ICD-10-CM

## 2025-01-09 DIAGNOSIS — E11.40 POORLY CONTROLLED TYPE 2 DIABETES MELLITUS WITH NEUROPATHY (HCC): ICD-10-CM

## 2025-01-09 LAB
25(OH)D3 SERPL-MCNC: 46.8 NG/ML
ALBUMIN SERPL-MCNC: 4 G/DL (ref 3.4–5)
ALBUMIN/GLOB SERPL: 1.3 {RATIO} (ref 1.1–2.2)
ALP SERPL-CCNC: 148 U/L (ref 40–129)
ALT SERPL-CCNC: 7 U/L (ref 10–40)
ANION GAP SERPL CALCULATED.3IONS-SCNC: 11 MMOL/L (ref 3–16)
AST SERPL-CCNC: 21 U/L (ref 15–37)
BILIRUB SERPL-MCNC: 0.4 MG/DL (ref 0–1)
BUN SERPL-MCNC: 29 MG/DL (ref 7–20)
CALCIUM SERPL-MCNC: 9.7 MG/DL (ref 8.3–10.6)
CHLORIDE SERPL-SCNC: 103 MMOL/L (ref 99–110)
CHOLEST SERPL-MCNC: 141 MG/DL (ref 0–199)
CO2 SERPL-SCNC: 28 MMOL/L (ref 21–32)
CREAT SERPL-MCNC: 0.9 MG/DL (ref 0.6–1.2)
CREAT UR-MCNC: 83.1 MG/DL (ref 28–259)
GFR SERPLBLD CREATININE-BSD FMLA CKD-EPI: 69 ML/MIN/{1.73_M2}
GLUCOSE SERPL-MCNC: 64 MG/DL (ref 70–99)
HDLC SERPL-MCNC: 56 MG/DL (ref 40–60)
LDL CHOLESTEROL: 70 MG/DL
MICROALBUMIN UR DL<=1MG/L-MCNC: 15.1 MG/DL
MICROALBUMIN/CREAT UR: 181.7 MG/G (ref 0–30)
POTASSIUM SERPL-SCNC: 3.9 MMOL/L (ref 3.5–5.1)
PROT SERPL-MCNC: 7.2 G/DL (ref 6.4–8.2)
SODIUM SERPL-SCNC: 142 MMOL/L (ref 136–145)
T4 FREE SERPL-MCNC: 1.3 NG/DL (ref 0.9–1.8)
TRIGL SERPL-MCNC: 77 MG/DL (ref 0–150)
TSH SERPL DL<=0.005 MIU/L-ACNC: 2.52 UIU/ML (ref 0.27–4.2)
VLDLC SERPL CALC-MCNC: 15 MG/DL

## 2025-01-09 PROCEDURE — 83036 HEMOGLOBIN GLYCOSYLATED A1C: CPT

## 2025-01-09 PROCEDURE — 80053 COMPREHEN METABOLIC PANEL: CPT

## 2025-01-09 PROCEDURE — 36415 COLL VENOUS BLD VENIPUNCTURE: CPT

## 2025-01-09 PROCEDURE — 82306 VITAMIN D 25 HYDROXY: CPT

## 2025-01-09 PROCEDURE — 84443 ASSAY THYROID STIM HORMONE: CPT

## 2025-01-09 PROCEDURE — 84439 ASSAY OF FREE THYROXINE: CPT

## 2025-01-09 PROCEDURE — 82570 ASSAY OF URINE CREATININE: CPT

## 2025-01-09 PROCEDURE — 80061 LIPID PANEL: CPT

## 2025-01-09 PROCEDURE — 82043 UR ALBUMIN QUANTITATIVE: CPT

## 2025-01-10 LAB
EST. AVERAGE GLUCOSE BLD GHB EST-MCNC: 168.6 MG/DL
HBA1C MFR BLD: 7.5 %

## 2025-01-15 DIAGNOSIS — E89.0 POSTABLATIVE HYPOTHYROIDISM: ICD-10-CM

## 2025-01-15 RX ORDER — LEVOTHYROXINE SODIUM 100 UG/1
TABLET ORAL
Qty: 90 TABLET | Refills: 0 | Status: SHIPPED | OUTPATIENT
Start: 2025-01-15

## 2025-01-20 ENCOUNTER — OFFICE VISIT (OUTPATIENT)
Dept: ENDOCRINOLOGY | Age: 70
End: 2025-01-20
Payer: COMMERCIAL

## 2025-01-20 VITALS
RESPIRATION RATE: 14 BRPM | SYSTOLIC BLOOD PRESSURE: 154 MMHG | BODY MASS INDEX: 39.38 KG/M2 | WEIGHT: 214 LBS | TEMPERATURE: 98 F | DIASTOLIC BLOOD PRESSURE: 64 MMHG | HEIGHT: 62 IN | HEART RATE: 63 BPM | OXYGEN SATURATION: 99 %

## 2025-01-20 DIAGNOSIS — E66.01 CLASS 2 SEVERE OBESITY WITH SERIOUS COMORBIDITY AND BODY MASS INDEX (BMI) OF 38.0 TO 38.9 IN ADULT, UNSPECIFIED OBESITY TYPE: ICD-10-CM

## 2025-01-20 DIAGNOSIS — E89.0 POSTABLATIVE HYPOTHYROIDISM: ICD-10-CM

## 2025-01-20 DIAGNOSIS — E11.65 POORLY CONTROLLED TYPE 2 DIABETES MELLITUS WITH NEUROPATHY (HCC): Primary | ICD-10-CM

## 2025-01-20 DIAGNOSIS — R74.8 ELEVATED ALKALINE PHOSPHATASE LEVEL: ICD-10-CM

## 2025-01-20 DIAGNOSIS — E11.40 POORLY CONTROLLED TYPE 2 DIABETES MELLITUS WITH NEUROPATHY (HCC): Primary | ICD-10-CM

## 2025-01-20 DIAGNOSIS — E04.2 MULTINODULAR GOITER: ICD-10-CM

## 2025-01-20 DIAGNOSIS — R93.89 THYROID WITH HETEROGENEOUS ECHOTEXTURE DETERMINED BY ULTRASOUND: ICD-10-CM

## 2025-01-20 DIAGNOSIS — Z86.39 HISTORY OF HYPERTHYROIDISM: ICD-10-CM

## 2025-01-20 DIAGNOSIS — I10 PRIMARY HYPERTENSION: ICD-10-CM

## 2025-01-20 DIAGNOSIS — E66.812 CLASS 2 SEVERE OBESITY WITH SERIOUS COMORBIDITY AND BODY MASS INDEX (BMI) OF 38.0 TO 38.9 IN ADULT, UNSPECIFIED OBESITY TYPE: ICD-10-CM

## 2025-01-20 DIAGNOSIS — E78.2 MIXED HYPERLIPIDEMIA: ICD-10-CM

## 2025-01-20 DIAGNOSIS — E11.21 DIABETIC NEPHROPATHY ASSOCIATED WITH TYPE 2 DIABETES MELLITUS (HCC): ICD-10-CM

## 2025-01-20 PROCEDURE — 1123F ACP DISCUSS/DSCN MKR DOCD: CPT | Performed by: INTERNAL MEDICINE

## 2025-01-20 PROCEDURE — 3078F DIAST BP <80 MM HG: CPT | Performed by: INTERNAL MEDICINE

## 2025-01-20 PROCEDURE — 99214 OFFICE O/P EST MOD 30 MIN: CPT | Performed by: INTERNAL MEDICINE

## 2025-01-20 PROCEDURE — 3077F SYST BP >= 140 MM HG: CPT | Performed by: INTERNAL MEDICINE

## 2025-01-20 PROCEDURE — G2211 COMPLEX E/M VISIT ADD ON: HCPCS | Performed by: INTERNAL MEDICINE

## 2025-01-20 PROCEDURE — 3051F HG A1C>EQUAL 7.0%<8.0%: CPT | Performed by: INTERNAL MEDICINE

## 2025-01-20 RX ORDER — INSULIN GLARGINE 300 U/ML
58 INJECTION, SOLUTION SUBCUTANEOUS NIGHTLY
Qty: 5 EACH | Refills: 3
Start: 2025-01-20

## 2025-01-20 RX ORDER — INSULIN GLARGINE 300 U/ML
60 INJECTION, SOLUTION SUBCUTANEOUS NIGHTLY
Qty: 5 EACH | Refills: 3
Start: 2025-01-20 | End: 2025-01-20 | Stop reason: SDUPTHER

## 2025-04-12 ENCOUNTER — HOSPITAL ENCOUNTER (OUTPATIENT)
Age: 70
Discharge: HOME | End: 2025-04-12
Payer: COMMERCIAL

## 2025-04-12 DIAGNOSIS — I10 PRIMARY HYPERTENSION: ICD-10-CM

## 2025-04-12 DIAGNOSIS — E11.40 POORLY CONTROLLED TYPE 2 DIABETES MELLITUS WITH NEUROPATHY (HCC): ICD-10-CM

## 2025-04-12 DIAGNOSIS — E04.2 MULTINODULAR GOITER: ICD-10-CM

## 2025-04-12 DIAGNOSIS — E78.2 MIXED HYPERLIPIDEMIA: ICD-10-CM

## 2025-04-12 DIAGNOSIS — E11.65 POORLY CONTROLLED TYPE 2 DIABETES MELLITUS WITH NEUROPATHY (HCC): ICD-10-CM

## 2025-04-12 DIAGNOSIS — E89.0 POSTABLATIVE HYPOTHYROIDISM: ICD-10-CM

## 2025-04-12 DIAGNOSIS — E11.21 DIABETIC NEPHROPATHY ASSOCIATED WITH TYPE 2 DIABETES MELLITUS: ICD-10-CM

## 2025-04-12 DIAGNOSIS — R74.8 ELEVATED ALKALINE PHOSPHATASE LEVEL: ICD-10-CM

## 2025-04-12 DIAGNOSIS — Z86.39 HISTORY OF HYPERTHYROIDISM: ICD-10-CM

## 2025-04-12 LAB
25(OH)D3 SERPL-MCNC: 57.6 NG/ML
ALBUMIN SERPL-MCNC: 3.9 G/DL (ref 3.4–5)
ALBUMIN/GLOB SERPL: 1.3 {RATIO} (ref 1.1–2.2)
ALP SERPL-CCNC: 162 U/L (ref 40–129)
ALT SERPL-CCNC: 9 U/L (ref 10–40)
ANION GAP SERPL CALCULATED.3IONS-SCNC: 11 MMOL/L (ref 3–16)
AST SERPL-CCNC: 21 U/L (ref 15–37)
BILIRUB SERPL-MCNC: 0.4 MG/DL (ref 0–1)
BUN SERPL-MCNC: 30 MG/DL (ref 7–20)
CALCIUM SERPL-MCNC: 9.7 MG/DL (ref 8.3–10.6)
CHLORIDE SERPL-SCNC: 103 MMOL/L (ref 99–110)
CHOLEST SERPL-MCNC: 137 MG/DL (ref 0–199)
CO2 SERPL-SCNC: 28 MMOL/L (ref 21–32)
CREAT SERPL-MCNC: 1.1 MG/DL (ref 0.6–1.2)
CREAT UR-MCNC: 131 MG/DL (ref 28–259)
GFR SERPLBLD CREATININE-BSD FMLA CKD-EPI: 54 ML/MIN/{1.73_M2}
GLUCOSE SERPL-MCNC: 62 MG/DL (ref 70–99)
HDLC SERPL-MCNC: 58 MG/DL (ref 40–60)
LDL CHOLESTEROL: 65 MG/DL
MICROALBUMIN UR DL<=1MG/L-MCNC: 6.99 MG/DL
MICROALBUMIN/CREAT UR: 53.4 MG/G (ref 0–30)
POTASSIUM SERPL-SCNC: 4.4 MMOL/L (ref 3.5–5.1)
PROT SERPL-MCNC: 6.9 G/DL (ref 6.4–8.2)
SODIUM SERPL-SCNC: 142 MMOL/L (ref 136–145)
T4 FREE SERPL-MCNC: 1.3 NG/DL (ref 0.9–1.8)
TRIGL SERPL-MCNC: 69 MG/DL (ref 0–150)
TSH SERPL DL<=0.005 MIU/L-ACNC: 2.63 UIU/ML (ref 0.27–4.2)
VLDLC SERPL CALC-MCNC: 14 MG/DL

## 2025-04-12 PROCEDURE — 80053 COMPREHEN METABOLIC PANEL: CPT

## 2025-04-12 PROCEDURE — 83036 HEMOGLOBIN GLYCOSYLATED A1C: CPT

## 2025-04-12 PROCEDURE — 80061 LIPID PANEL: CPT

## 2025-04-12 PROCEDURE — 36415 COLL VENOUS BLD VENIPUNCTURE: CPT

## 2025-04-12 PROCEDURE — 84439 ASSAY OF FREE THYROXINE: CPT

## 2025-04-12 PROCEDURE — 84443 ASSAY THYROID STIM HORMONE: CPT

## 2025-04-12 PROCEDURE — 82306 VITAMIN D 25 HYDROXY: CPT

## 2025-04-12 PROCEDURE — 82043 UR ALBUMIN QUANTITATIVE: CPT

## 2025-04-12 PROCEDURE — 82570 ASSAY OF URINE CREATININE: CPT

## 2025-04-13 LAB
EST. AVERAGE GLUCOSE BLD GHB EST-MCNC: 191.5 MG/DL
HBA1C MFR BLD: 8.3 %

## 2025-04-17 ENCOUNTER — OFFICE VISIT (OUTPATIENT)
Dept: ENDOCRINOLOGY | Age: 70
End: 2025-04-17
Payer: COMMERCIAL

## 2025-04-17 VITALS
BODY MASS INDEX: 39.75 KG/M2 | RESPIRATION RATE: 14 BRPM | HEIGHT: 62 IN | OXYGEN SATURATION: 99 % | SYSTOLIC BLOOD PRESSURE: 148 MMHG | WEIGHT: 216 LBS | DIASTOLIC BLOOD PRESSURE: 68 MMHG | TEMPERATURE: 98 F | HEART RATE: 60 BPM

## 2025-04-17 DIAGNOSIS — I10 PRIMARY HYPERTENSION: ICD-10-CM

## 2025-04-17 DIAGNOSIS — E11.65 POORLY CONTROLLED TYPE 2 DIABETES MELLITUS WITH NEUROPATHY (HCC): Primary | ICD-10-CM

## 2025-04-17 DIAGNOSIS — E11.40 POORLY CONTROLLED TYPE 2 DIABETES MELLITUS WITH NEUROPATHY (HCC): Primary | ICD-10-CM

## 2025-04-17 DIAGNOSIS — E66.812 CLASS 2 SEVERE OBESITY WITH SERIOUS COMORBIDITY AND BODY MASS INDEX (BMI) OF 39.0 TO 39.9 IN ADULT, UNSPECIFIED OBESITY TYPE: ICD-10-CM

## 2025-04-17 DIAGNOSIS — E66.01 CLASS 2 SEVERE OBESITY WITH SERIOUS COMORBIDITY AND BODY MASS INDEX (BMI) OF 39.0 TO 39.9 IN ADULT, UNSPECIFIED OBESITY TYPE: ICD-10-CM

## 2025-04-17 DIAGNOSIS — E11.21 DIABETIC NEPHROPATHY ASSOCIATED WITH TYPE 2 DIABETES MELLITUS: ICD-10-CM

## 2025-04-17 DIAGNOSIS — R93.89 THYROID WITH HETEROGENEOUS ECHOTEXTURE DETERMINED BY ULTRASOUND: ICD-10-CM

## 2025-04-17 DIAGNOSIS — E78.2 MIXED HYPERLIPIDEMIA: ICD-10-CM

## 2025-04-17 DIAGNOSIS — Z86.39 HISTORY OF HYPERTHYROIDISM: ICD-10-CM

## 2025-04-17 DIAGNOSIS — E89.0 POSTABLATIVE HYPOTHYROIDISM: ICD-10-CM

## 2025-04-17 DIAGNOSIS — R74.8 ELEVATED ALKALINE PHOSPHATASE LEVEL: ICD-10-CM

## 2025-04-17 PROCEDURE — 99214 OFFICE O/P EST MOD 30 MIN: CPT | Performed by: INTERNAL MEDICINE

## 2025-04-17 PROCEDURE — 3077F SYST BP >= 140 MM HG: CPT | Performed by: INTERNAL MEDICINE

## 2025-04-17 PROCEDURE — G2211 COMPLEX E/M VISIT ADD ON: HCPCS | Performed by: INTERNAL MEDICINE

## 2025-04-17 PROCEDURE — 3078F DIAST BP <80 MM HG: CPT | Performed by: INTERNAL MEDICINE

## 2025-04-17 PROCEDURE — 3052F HG A1C>EQUAL 8.0%<EQUAL 9.0%: CPT | Performed by: INTERNAL MEDICINE

## 2025-04-17 PROCEDURE — 1123F ACP DISCUSS/DSCN MKR DOCD: CPT | Performed by: INTERNAL MEDICINE

## 2025-04-17 PROCEDURE — NBSRV NON-BILLABLE SERVICE: Performed by: INTERNAL MEDICINE

## 2025-04-17 RX ORDER — METFORMIN HYDROCHLORIDE 500 MG/1
2000 TABLET, EXTENDED RELEASE ORAL DAILY
Qty: 360 TABLET | Refills: 1 | Status: SHIPPED | OUTPATIENT
Start: 2025-04-17

## 2025-04-17 RX ORDER — GLIPIZIDE 10 MG/1
10 TABLET, FILM COATED, EXTENDED RELEASE ORAL 2 TIMES DAILY
Qty: 180 TABLET | Refills: 1 | Status: SHIPPED | OUTPATIENT
Start: 2025-04-17

## 2025-04-17 RX ORDER — LEVOTHYROXINE SODIUM 100 UG/1
TABLET ORAL
Qty: 90 TABLET | Refills: 1 | Status: SHIPPED | OUTPATIENT
Start: 2025-04-17

## 2025-04-17 NOTE — PROGRESS NOTES
Mckayla Chavez is a 69 y.o. female who presents for Type 2 diabetes mellitus.     Current symptoms/problems include  hyperglycemia  and are worsening.     1.  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    Comorbid conditions: hyperlipidemia and Neuropathy     Current diabetic medications include: metformin  mg 4 tablets daily, glipizide XL 10 mg bid, Toujeo Max 58 units qhs     Had ovarian tumor removed and hysterectomy.  Had kidney stones.     Intolerance to diabetes medications: No  Had yeast infections. Jardiance may not be a good choice.  Weight trend: stable  Prior visit with dietician: yes  Current diet: on average, 2-3 meals per day  Current exercise: walks     Current monitoring regimen: home blood tests - 2 times daily  Has brought blood glucose log/meter:  No  Home blood sugar records: fasting range:  and postprandial range: 100-140  Any episodes of hypoglycemia? Yes  Hypoglycemia frequency and time(s):  one episode in 3 months  Does patient have Glucagon emergency kit? No  Does patient have rapid acting carbohydrate?  No  Does patient wear a medic alert bracelet or necklace?  No     2. Mixed hyperlipidemia  No muscle pain.     3. Primary hypertension  No headaches.     4. Obesity  Tries to eat healthier. Lower carbs.     5. History of Hyperthyroidism      10.3 mCi 131 capsule was administered orally on on 07/12/2019.           TSH <0.005, FT4 1.41  TSH low since 2017 2.19-0.18-0.051-<005  No palpitations, anxiety, had sleep problems for a long time.  No family Hx of thyroid problems.     6. Thyroid with heterogeneous echotexture determined by ultrasound   No thyroid cancer in family.  No radiation in her neck.     7. Elevated alkaline phosphatase level  No bone pain.     8. Hypothyroidism  Has fatigue, similar to before.    9.  Diabetic nephropathy  No urination problems     EXAMINATION:   THYROID

## 2025-04-18 DIAGNOSIS — E89.0 POSTABLATIVE HYPOTHYROIDISM: ICD-10-CM

## 2025-04-18 RX ORDER — LEVOTHYROXINE SODIUM 100 UG/1
100 TABLET ORAL DAILY
Qty: 90 TABLET | Refills: 3 | OUTPATIENT
Start: 2025-04-18

## 2025-07-12 ENCOUNTER — HOSPITAL ENCOUNTER (OUTPATIENT)
Age: 70
Discharge: HOME | End: 2025-07-12
Payer: COMMERCIAL

## 2025-07-12 DIAGNOSIS — E78.2 MIXED HYPERLIPIDEMIA: ICD-10-CM

## 2025-07-12 DIAGNOSIS — R93.89 THYROID WITH HETEROGENEOUS ECHOTEXTURE DETERMINED BY ULTRASOUND: ICD-10-CM

## 2025-07-12 DIAGNOSIS — R74.8 ELEVATED ALKALINE PHOSPHATASE LEVEL: ICD-10-CM

## 2025-07-12 DIAGNOSIS — E11.40 POORLY CONTROLLED TYPE 2 DIABETES MELLITUS WITH NEUROPATHY (HCC): ICD-10-CM

## 2025-07-12 DIAGNOSIS — E11.65 POORLY CONTROLLED TYPE 2 DIABETES MELLITUS WITH NEUROPATHY (HCC): ICD-10-CM

## 2025-07-12 DIAGNOSIS — I10 PRIMARY HYPERTENSION: ICD-10-CM

## 2025-07-12 DIAGNOSIS — Z86.39 HISTORY OF HYPERTHYROIDISM: ICD-10-CM

## 2025-07-12 LAB
ALBUMIN SERPL-MCNC: 4 G/DL (ref 3.4–5)
ALBUMIN/GLOB SERPL: 1.4 {RATIO} (ref 1.1–2.2)
ALP SERPL-CCNC: 128 U/L (ref 40–129)
ALT SERPL-CCNC: 6 U/L (ref 10–40)
ANION GAP SERPL CALCULATED.3IONS-SCNC: 14 MMOL/L (ref 3–16)
AST SERPL-CCNC: 17 U/L (ref 15–37)
BILIRUB SERPL-MCNC: 0.4 MG/DL (ref 0–1)
BUN SERPL-MCNC: 32 MG/DL (ref 7–20)
CALCIUM SERPL-MCNC: 9.1 MG/DL (ref 8.3–10.6)
CHLORIDE SERPL-SCNC: 103 MMOL/L (ref 99–110)
CHOLEST SERPL-MCNC: 138 MG/DL (ref 0–199)
CO2 SERPL-SCNC: 26 MMOL/L (ref 21–32)
CREAT SERPL-MCNC: 1 MG/DL (ref 0.6–1.2)
CREAT UR-MCNC: 101 MG/DL (ref 28–259)
GFR SERPLBLD CREATININE-BSD FMLA CKD-EPI: 61 ML/MIN/{1.73_M2}
GLUCOSE SERPL-MCNC: 65 MG/DL (ref 70–99)
HDLC SERPL-MCNC: 56 MG/DL (ref 40–60)
LDL CHOLESTEROL: 67 MG/DL
MICROALBUMIN UR DL<=1MG/L-MCNC: 6.96 MG/DL
MICROALBUMIN/CREAT UR: 68.9 MG/G (ref 0–30)
POTASSIUM SERPL-SCNC: 4 MMOL/L (ref 3.5–5.1)
PROT SERPL-MCNC: 6.8 G/DL (ref 6.4–8.2)
SODIUM SERPL-SCNC: 143 MMOL/L (ref 136–145)
T4 FREE SERPL-MCNC: 1.4 NG/DL (ref 0.9–1.8)
TRIGL SERPL-MCNC: 75 MG/DL (ref 0–150)
TSH SERPL DL<=0.005 MIU/L-ACNC: 3.54 UIU/ML (ref 0.27–4.2)
VLDLC SERPL CALC-MCNC: 15 MG/DL

## 2025-07-12 PROCEDURE — 83036 HEMOGLOBIN GLYCOSYLATED A1C: CPT

## 2025-07-12 PROCEDURE — 82570 ASSAY OF URINE CREATININE: CPT

## 2025-07-12 PROCEDURE — 36415 COLL VENOUS BLD VENIPUNCTURE: CPT

## 2025-07-12 PROCEDURE — 84439 ASSAY OF FREE THYROXINE: CPT

## 2025-07-12 PROCEDURE — 84443 ASSAY THYROID STIM HORMONE: CPT

## 2025-07-12 PROCEDURE — 82043 UR ALBUMIN QUANTITATIVE: CPT

## 2025-07-12 PROCEDURE — 80061 LIPID PANEL: CPT

## 2025-07-12 PROCEDURE — 80053 COMPREHEN METABOLIC PANEL: CPT

## 2025-07-13 LAB
EST. AVERAGE GLUCOSE BLD GHB EST-MCNC: 180 MG/DL
HBA1C MFR BLD: 7.9 %

## 2025-07-17 ENCOUNTER — OFFICE VISIT (OUTPATIENT)
Dept: ENDOCRINOLOGY | Age: 70
End: 2025-07-17
Payer: COMMERCIAL

## 2025-07-17 VITALS
WEIGHT: 219 LBS | HEART RATE: 70 BPM | RESPIRATION RATE: 14 BRPM | BODY MASS INDEX: 40.3 KG/M2 | SYSTOLIC BLOOD PRESSURE: 145 MMHG | TEMPERATURE: 98 F | HEIGHT: 62 IN | OXYGEN SATURATION: 97 % | DIASTOLIC BLOOD PRESSURE: 93 MMHG

## 2025-07-17 DIAGNOSIS — E66.812 CLASS 2 SEVERE OBESITY WITH SERIOUS COMORBIDITY AND BODY MASS INDEX (BMI) OF 39.0 TO 39.9 IN ADULT, UNSPECIFIED OBESITY TYPE (HCC): ICD-10-CM

## 2025-07-17 DIAGNOSIS — R74.8 ELEVATED ALKALINE PHOSPHATASE LEVEL: ICD-10-CM

## 2025-07-17 DIAGNOSIS — E11.21 DIABETIC NEPHROPATHY ASSOCIATED WITH TYPE 2 DIABETES MELLITUS (HCC): ICD-10-CM

## 2025-07-17 DIAGNOSIS — E66.01 CLASS 2 SEVERE OBESITY WITH SERIOUS COMORBIDITY AND BODY MASS INDEX (BMI) OF 39.0 TO 39.9 IN ADULT, UNSPECIFIED OBESITY TYPE (HCC): ICD-10-CM

## 2025-07-17 DIAGNOSIS — E11.40 POORLY CONTROLLED TYPE 2 DIABETES MELLITUS WITH NEUROPATHY (HCC): Primary | ICD-10-CM

## 2025-07-17 DIAGNOSIS — R93.89 THYROID WITH HETEROGENEOUS ECHOTEXTURE DETERMINED BY ULTRASOUND: ICD-10-CM

## 2025-07-17 DIAGNOSIS — E11.65 POORLY CONTROLLED TYPE 2 DIABETES MELLITUS WITH NEUROPATHY (HCC): Primary | ICD-10-CM

## 2025-07-17 DIAGNOSIS — Z86.39 HISTORY OF HYPERTHYROIDISM: ICD-10-CM

## 2025-07-17 DIAGNOSIS — E89.0 POSTABLATIVE HYPOTHYROIDISM: ICD-10-CM

## 2025-07-17 DIAGNOSIS — I10 PRIMARY HYPERTENSION: ICD-10-CM

## 2025-07-17 DIAGNOSIS — E78.2 MIXED HYPERLIPIDEMIA: ICD-10-CM

## 2025-07-17 PROCEDURE — G2211 COMPLEX E/M VISIT ADD ON: HCPCS | Performed by: INTERNAL MEDICINE

## 2025-07-17 PROCEDURE — 3078F DIAST BP <80 MM HG: CPT | Performed by: INTERNAL MEDICINE

## 2025-07-17 PROCEDURE — 1123F ACP DISCUSS/DSCN MKR DOCD: CPT | Performed by: INTERNAL MEDICINE

## 2025-07-17 PROCEDURE — NBSRV NON-BILLABLE SERVICE: Performed by: INTERNAL MEDICINE

## 2025-07-17 PROCEDURE — 3051F HG A1C>EQUAL 7.0%<8.0%: CPT | Performed by: INTERNAL MEDICINE

## 2025-07-17 PROCEDURE — 99214 OFFICE O/P EST MOD 30 MIN: CPT | Performed by: INTERNAL MEDICINE

## 2025-07-17 PROCEDURE — 3077F SYST BP >= 140 MM HG: CPT | Performed by: INTERNAL MEDICINE

## 2025-08-20 ENCOUNTER — OFFICE VISIT (OUTPATIENT)
Dept: CARDIOLOGY CLINIC | Age: 70
End: 2025-08-20
Payer: COMMERCIAL

## 2025-08-20 VITALS
SYSTOLIC BLOOD PRESSURE: 136 MMHG | HEIGHT: 62 IN | BODY MASS INDEX: 40.48 KG/M2 | WEIGHT: 220 LBS | OXYGEN SATURATION: 98 % | DIASTOLIC BLOOD PRESSURE: 74 MMHG | HEART RATE: 81 BPM

## 2025-08-20 DIAGNOSIS — E11.21 DIABETIC NEPHROPATHY ASSOCIATED WITH TYPE 2 DIABETES MELLITUS (HCC): ICD-10-CM

## 2025-08-20 DIAGNOSIS — E78.2 MIXED HYPERLIPIDEMIA: ICD-10-CM

## 2025-08-20 DIAGNOSIS — E11.40 POORLY CONTROLLED TYPE 2 DIABETES MELLITUS WITH NEUROPATHY (HCC): ICD-10-CM

## 2025-08-20 DIAGNOSIS — R94.31 ABNORMAL EKG: ICD-10-CM

## 2025-08-20 DIAGNOSIS — E11.65 POORLY CONTROLLED TYPE 2 DIABETES MELLITUS WITH NEUROPATHY (HCC): ICD-10-CM

## 2025-08-20 DIAGNOSIS — I10 PRIMARY HYPERTENSION: Primary | ICD-10-CM

## 2025-08-20 PROCEDURE — 93000 ELECTROCARDIOGRAM COMPLETE: CPT | Performed by: INTERNAL MEDICINE

## 2025-08-20 PROCEDURE — 3078F DIAST BP <80 MM HG: CPT | Performed by: INTERNAL MEDICINE

## 2025-08-20 PROCEDURE — G2211 COMPLEX E/M VISIT ADD ON: HCPCS | Performed by: INTERNAL MEDICINE

## 2025-08-20 PROCEDURE — 99214 OFFICE O/P EST MOD 30 MIN: CPT | Performed by: INTERNAL MEDICINE

## 2025-08-20 PROCEDURE — 1123F ACP DISCUSS/DSCN MKR DOCD: CPT | Performed by: INTERNAL MEDICINE

## 2025-08-20 PROCEDURE — 3051F HG A1C>EQUAL 7.0%<8.0%: CPT | Performed by: INTERNAL MEDICINE

## 2025-08-20 PROCEDURE — 3075F SYST BP GE 130 - 139MM HG: CPT | Performed by: INTERNAL MEDICINE

## 2025-08-20 RX ORDER — FUROSEMIDE 20 MG/1
20 TABLET ORAL DAILY
Qty: 90 TABLET | Refills: 1 | Status: SHIPPED | OUTPATIENT
Start: 2025-08-20

## (undated) DEVICE — SOLUTION ANTIFOG VIS SYS CLEARIFY LAPSCP

## (undated) DEVICE — GOWN,AURORA,NONREINF,RAGLAN,XXL,STERILE: Brand: MEDLINE

## (undated) DEVICE — TIP COVER ACCESSORY

## (undated) DEVICE — TISSUE RETRIEVAL SYSTEM: Brand: INZII RETRIEVAL SYSTEM

## (undated) DEVICE — PAD TBL OP RM TRENDELENBURG STATIC TORSO W/STRAPS

## (undated) DEVICE — STANDARD HYPODERMIC NEEDLE,POLYPROPYLENE HUB: Brand: MONOJECT

## (undated) DEVICE — COVER LT HNDL BLU PLAS

## (undated) DEVICE — MERCY FAIRFIELD TURNOVER KIT: Brand: MEDLINE INDUSTRIES, INC.

## (undated) DEVICE — INVIEW CLEAR LEGGINGS: Brand: CONVERTORS

## (undated) DEVICE — SUTURE DEV SZ 2-0 WND CLSR ABSRB GS-22 VLOC COVIDIEN VLOCM2145

## (undated) DEVICE — CHLORAPREP 26ML ORANGE

## (undated) DEVICE — CATHETER TRAY 16 FR 5 CC FOL ANTIREFLX SAMPLING PRT DOVER

## (undated) DEVICE — SYRINGE, LUER LOCK, 10ML: Brand: MEDLINE

## (undated) DEVICE — BLANKET WRM W29.9XL79.1IN UP BODY FORC AIR MISTRAL-AIR

## (undated) DEVICE — ROBOTIC PK

## (undated) DEVICE — SUTURE MCRYL SZ 4-0 L27IN ABSRB UD L19MM PS-2 1/2 CIR PRIM Y426H

## (undated) DEVICE — NEEDLE INSUF L120MM DIA2MM DISP FOR PNEUMOPERI ENDOPATH

## (undated) DEVICE — ARM DRAPE

## (undated) DEVICE — 35 ML SYRINGE LUER-LOCK TIP: Brand: MONOJECT

## (undated) DEVICE — ADHESIVE SKIN CLSR 0.7ML TOP DERMBND ADV

## (undated) DEVICE — CANNULA SEAL

## (undated) DEVICE — BLADELESS OBTURATOR: Brand: WECK VISTA

## (undated) DEVICE — AIRSEAL 8 MM ACCESS PORT AND LOW PROFILE OBTURATOR WITH BLADELESS OPTICAL TIP, 120 MM LENGTH: Brand: AIRSEAL

## (undated) DEVICE — TRI-LUMEN FILTERED TUBE SET WITH ACTIVATED CHARCOAL FILTER: Brand: AIRSEAL